# Patient Record
Sex: FEMALE | Race: WHITE | NOT HISPANIC OR LATINO | Employment: OTHER | URBAN - METROPOLITAN AREA
[De-identification: names, ages, dates, MRNs, and addresses within clinical notes are randomized per-mention and may not be internally consistent; named-entity substitution may affect disease eponyms.]

---

## 2018-02-08 ENCOUNTER — OFFICE VISIT CONVERTED (OUTPATIENT)
Dept: FAMILY MEDICINE CLINIC | Facility: CLINIC | Age: 73
End: 2018-02-08
Attending: NURSE PRACTITIONER

## 2018-04-30 ENCOUNTER — CONVERSION ENCOUNTER (OUTPATIENT)
Dept: GENERAL RADIOLOGY | Facility: HOSPITAL | Age: 73
End: 2018-04-30

## 2018-05-16 ENCOUNTER — CONVERSION ENCOUNTER (OUTPATIENT)
Dept: FAMILY MEDICINE CLINIC | Facility: CLINIC | Age: 73
End: 2018-05-16

## 2018-05-16 ENCOUNTER — OFFICE VISIT CONVERTED (OUTPATIENT)
Dept: FAMILY MEDICINE CLINIC | Facility: CLINIC | Age: 73
End: 2018-05-16
Attending: NURSE PRACTITIONER

## 2018-05-29 ENCOUNTER — CONVERSION ENCOUNTER (OUTPATIENT)
Dept: FAMILY MEDICINE CLINIC | Facility: CLINIC | Age: 73
End: 2018-05-29

## 2018-05-29 ENCOUNTER — OFFICE VISIT CONVERTED (OUTPATIENT)
Dept: FAMILY MEDICINE CLINIC | Facility: CLINIC | Age: 73
End: 2018-05-29
Attending: NURSE PRACTITIONER

## 2018-10-09 ENCOUNTER — OFFICE VISIT CONVERTED (OUTPATIENT)
Dept: FAMILY MEDICINE CLINIC | Facility: CLINIC | Age: 73
End: 2018-10-09
Attending: NURSE PRACTITIONER

## 2018-10-11 ENCOUNTER — OFFICE VISIT CONVERTED (OUTPATIENT)
Dept: FAMILY MEDICINE CLINIC | Facility: CLINIC | Age: 73
End: 2018-10-11
Attending: NURSE PRACTITIONER

## 2019-02-22 ENCOUNTER — HOSPITAL ENCOUNTER (OUTPATIENT)
Dept: FAMILY MEDICINE CLINIC | Facility: CLINIC | Age: 74
Discharge: HOME | End: 2019-02-22

## 2019-02-22 ENCOUNTER — OFFICE VISIT CONVERTED (OUTPATIENT)
Dept: FAMILY MEDICINE CLINIC | Facility: CLINIC | Age: 74
End: 2019-02-22
Attending: NURSE PRACTITIONER

## 2019-02-24 LAB — BACTERIA SPEC AEROBE CULT: NORMAL

## 2019-03-22 ENCOUNTER — OFFICE VISIT CONVERTED (OUTPATIENT)
Dept: FAMILY MEDICINE CLINIC | Facility: CLINIC | Age: 74
End: 2019-03-22
Attending: NURSE PRACTITIONER

## 2019-03-22 ENCOUNTER — CONVERSION ENCOUNTER (OUTPATIENT)
Dept: FAMILY MEDICINE CLINIC | Facility: CLINIC | Age: 74
End: 2019-03-22

## 2019-03-22 ENCOUNTER — HOSPITAL ENCOUNTER (OUTPATIENT)
Dept: FAMILY MEDICINE CLINIC | Facility: CLINIC | Age: 74
Discharge: HOME | End: 2019-03-22

## 2019-03-22 LAB
ALBUMIN SERPL-MCNC: 3.8 G/DL (ref 3.5–5)
ALBUMIN/GLOB SERPL: 1.1 {RATIO} (ref 1.4–2.6)
ALP SERPL-CCNC: 80 U/L (ref 43–160)
ALT SERPL-CCNC: 15 U/L (ref 10–40)
ANION GAP SERPL CALC-SCNC: 16 MMOL/L (ref 8–19)
AST SERPL-CCNC: 17 U/L (ref 15–50)
BILIRUB SERPL-MCNC: 0.45 MG/DL (ref 0.2–1.3)
BUN SERPL-MCNC: 17 MG/DL (ref 5–25)
BUN/CREAT SERPL: 28 {RATIO} (ref 6–20)
CALCIUM SERPL-MCNC: 9.3 MG/DL (ref 8.7–10.4)
CHLORIDE SERPL-SCNC: 101 MMOL/L (ref 99–111)
CHOLEST SERPL-MCNC: 136 MG/DL (ref 107–200)
CHOLEST/HDLC SERPL: 3.6 {RATIO} (ref 3–6)
CONV CO2: 25 MMOL/L (ref 22–32)
CONV CREATININE URINE, RANDOM: 49.7 MG/DL (ref 10–300)
CONV MICROALBUM.,U,RANDOM: <12 MG/L (ref 0–20)
CONV TOTAL PROTEIN: 7.3 G/DL (ref 6.3–8.2)
CREAT UR-MCNC: 0.6 MG/DL (ref 0.5–0.9)
EST. AVERAGE GLUCOSE BLD GHB EST-MCNC: 114 MG/DL
GFR SERPLBLD BASED ON 1.73 SQ M-ARVRAT: >60 ML/MIN/{1.73_M2}
GLOBULIN UR ELPH-MCNC: 3.5 G/DL (ref 2–3.5)
GLUCOSE SERPL-MCNC: 87 MG/DL (ref 65–99)
HBA1C MFR BLD: 5.6 % (ref 3.5–5.7)
HDLC SERPL-MCNC: 38 MG/DL (ref 40–60)
LDLC SERPL CALC-MCNC: 75 MG/DL (ref 70–100)
MICROALBUMIN/CREAT UR: 24.1 MG/G{CRE} (ref 0–35)
OSMOLALITY SERPL CALC.SUM OF ELEC: 287 MOSM/KG (ref 273–304)
POTASSIUM SERPL-SCNC: 4 MMOL/L (ref 3.5–5.3)
SODIUM SERPL-SCNC: 138 MMOL/L (ref 135–147)
TRIGL SERPL-MCNC: 113 MG/DL (ref 40–150)
VLDLC SERPL-MCNC: 23 MG/DL (ref 5–37)

## 2019-07-29 ENCOUNTER — HOSPITAL ENCOUNTER (OUTPATIENT)
Dept: GENERAL RADIOLOGY | Facility: HOSPITAL | Age: 74
Discharge: HOME | End: 2019-07-29

## 2019-10-02 ENCOUNTER — HOSPITAL ENCOUNTER (OUTPATIENT)
Dept: FAMILY MEDICINE CLINIC | Facility: CLINIC | Age: 74
Discharge: HOME | End: 2019-10-02
Attending: NURSE PRACTITIONER

## 2019-10-02 ENCOUNTER — OFFICE VISIT CONVERTED (OUTPATIENT)
Dept: FAMILY MEDICINE CLINIC | Facility: CLINIC | Age: 74
End: 2019-10-02
Attending: NURSE PRACTITIONER

## 2019-10-02 LAB
ALBUMIN SERPL-MCNC: 4.1 G/DL (ref 3.5–5)
ALBUMIN/GLOB SERPL: 1.2 {RATIO} (ref 1.4–2.6)
ALP SERPL-CCNC: 70 U/L (ref 43–160)
ALT SERPL-CCNC: 20 U/L (ref 10–40)
ANION GAP SERPL CALC-SCNC: 17 MMOL/L (ref 8–19)
AST SERPL-CCNC: 25 U/L (ref 15–50)
BASOPHILS # BLD AUTO: 0.05 10*3/UL (ref 0–0.2)
BASOPHILS NFR BLD AUTO: 0.6 % (ref 0–3)
BILIRUB SERPL-MCNC: 0.38 MG/DL (ref 0.2–1.3)
BUN SERPL-MCNC: 21 MG/DL (ref 5–25)
BUN/CREAT SERPL: 27 {RATIO} (ref 6–20)
CALCIUM SERPL-MCNC: 9.6 MG/DL (ref 8.7–10.4)
CHLORIDE SERPL-SCNC: 103 MMOL/L (ref 99–111)
CHOLEST SERPL-MCNC: 142 MG/DL (ref 107–200)
CHOLEST/HDLC SERPL: 4.1 {RATIO} (ref 3–6)
CONV ABS IMM GRAN: 0.03 10*3/UL (ref 0–0.2)
CONV CO2: 24 MMOL/L (ref 22–32)
CONV CREATININE URINE, RANDOM: 250.3 MG/DL (ref 10–300)
CONV IMMATURE GRAN: 0.3 % (ref 0–1.8)
CONV MICROALBUM.,U,RANDOM: <12 MG/L (ref 0–20)
CONV TOTAL PROTEIN: 7.5 G/DL (ref 6.3–8.2)
CREAT UR-MCNC: 0.78 MG/DL (ref 0.5–0.9)
DEPRECATED RDW RBC AUTO: 39.3 FL (ref 36.4–46.3)
EOSINOPHIL # BLD AUTO: 0.11 10*3/UL (ref 0–0.7)
EOSINOPHIL # BLD AUTO: 1.2 % (ref 0–7)
ERYTHROCYTE [DISTWIDTH] IN BLOOD BY AUTOMATED COUNT: 12.9 % (ref 11.7–14.4)
EST. AVERAGE GLUCOSE BLD GHB EST-MCNC: 120 MG/DL
GFR SERPLBLD BASED ON 1.73 SQ M-ARVRAT: >60 ML/MIN/{1.73_M2}
GLOBULIN UR ELPH-MCNC: 3.4 G/DL (ref 2–3.5)
GLUCOSE SERPL-MCNC: 123 MG/DL (ref 65–99)
HBA1C MFR BLD: 5.8 % (ref 3.5–5.7)
HCT VFR BLD AUTO: 42.9 % (ref 37–47)
HDLC SERPL-MCNC: 35 MG/DL (ref 40–60)
HGB BLD-MCNC: 14.3 G/DL (ref 12–16)
LDLC SERPL CALC-MCNC: 68 MG/DL (ref 70–100)
LYMPHOCYTES # BLD AUTO: 2.86 10*3/UL (ref 1–5)
LYMPHOCYTES NFR BLD AUTO: 31.8 % (ref 20–45)
MCH RBC QN AUTO: 28.3 PG (ref 27–31)
MCHC RBC AUTO-ENTMCNC: 33.3 G/DL (ref 33–37)
MCV RBC AUTO: 84.8 FL (ref 81–99)
MICROALBUMIN/CREAT UR: 4.8 MG/G{CRE} (ref 0–35)
MONOCYTES # BLD AUTO: 0.55 10*3/UL (ref 0.2–1.2)
MONOCYTES NFR BLD AUTO: 6.1 % (ref 3–10)
NEUTROPHILS # BLD AUTO: 5.39 10*3/UL (ref 2–8)
NEUTROPHILS NFR BLD AUTO: 60 % (ref 30–85)
NRBC CBCN: 0 % (ref 0–0.7)
OSMOLALITY SERPL CALC.SUM OF ELEC: 296 MOSM/KG (ref 273–304)
PLATELET # BLD AUTO: 357 10*3/UL (ref 130–400)
PMV BLD AUTO: 11.3 FL (ref 9.4–12.3)
POTASSIUM SERPL-SCNC: 3.4 MMOL/L (ref 3.5–5.3)
RBC # BLD AUTO: 5.06 10*6/UL (ref 4.2–5.4)
SODIUM SERPL-SCNC: 141 MMOL/L (ref 135–147)
T4 FREE SERPL-MCNC: 1.2 NG/DL (ref 0.9–1.8)
TRIGL SERPL-MCNC: 194 MG/DL (ref 40–150)
TSH SERPL-ACNC: 2.21 M[IU]/L (ref 0.27–4.2)
VLDLC SERPL-MCNC: 39 MG/DL (ref 5–37)
WBC # BLD AUTO: 8.99 10*3/UL (ref 4.8–10.8)

## 2019-10-03 LAB — 25(OH)D3 SERPL-MCNC: 19.1 NG/ML (ref 30–100)

## 2019-12-09 ENCOUNTER — OFFICE VISIT CONVERTED (OUTPATIENT)
Dept: FAMILY MEDICINE CLINIC | Facility: CLINIC | Age: 74
End: 2019-12-09
Attending: NURSE PRACTITIONER

## 2019-12-12 ENCOUNTER — OFFICE VISIT CONVERTED (OUTPATIENT)
Dept: ORTHOPEDIC SURGERY | Facility: CLINIC | Age: 74
End: 2019-12-12
Attending: ORTHOPAEDIC SURGERY

## 2020-01-27 ENCOUNTER — OFFICE VISIT CONVERTED (OUTPATIENT)
Dept: FAMILY MEDICINE CLINIC | Facility: CLINIC | Age: 75
End: 2020-01-27
Attending: NURSE PRACTITIONER

## 2020-01-27 ENCOUNTER — CONVERSION ENCOUNTER (OUTPATIENT)
Dept: FAMILY MEDICINE CLINIC | Facility: CLINIC | Age: 75
End: 2020-01-27

## 2020-01-30 ENCOUNTER — HOSPITAL ENCOUNTER (OUTPATIENT)
Dept: GENERAL RADIOLOGY | Facility: HOSPITAL | Age: 75
Discharge: HOME | End: 2020-01-30
Attending: NURSE PRACTITIONER

## 2020-01-30 ENCOUNTER — HOSPITAL ENCOUNTER (OUTPATIENT)
Dept: FAMILY MEDICINE CLINIC | Facility: CLINIC | Age: 75
Discharge: HOME | End: 2020-01-30
Attending: NURSE PRACTITIONER

## 2020-01-30 ENCOUNTER — OFFICE VISIT CONVERTED (OUTPATIENT)
Dept: FAMILY MEDICINE CLINIC | Facility: CLINIC | Age: 75
End: 2020-01-30
Attending: NURSE PRACTITIONER

## 2020-01-30 ENCOUNTER — CONVERSION ENCOUNTER (OUTPATIENT)
Dept: FAMILY MEDICINE CLINIC | Facility: CLINIC | Age: 75
End: 2020-01-30

## 2020-02-01 LAB
BACTERIA SPEC AEROBE CULT: ABNORMAL
BACTERIA SPEC AEROBE CULT: ABNORMAL

## 2020-06-01 ENCOUNTER — OFFICE VISIT CONVERTED (OUTPATIENT)
Dept: FAMILY MEDICINE CLINIC | Facility: CLINIC | Age: 75
End: 2020-06-01
Attending: NURSE PRACTITIONER

## 2020-06-01 ENCOUNTER — HOSPITAL ENCOUNTER (OUTPATIENT)
Dept: FAMILY MEDICINE CLINIC | Facility: CLINIC | Age: 75
Discharge: HOME | End: 2020-06-01
Attending: NURSE PRACTITIONER

## 2020-06-01 ENCOUNTER — CONVERSION ENCOUNTER (OUTPATIENT)
Dept: FAMILY MEDICINE CLINIC | Facility: CLINIC | Age: 75
End: 2020-06-01

## 2020-06-01 LAB
ALBUMIN SERPL-MCNC: 3.9 G/DL (ref 3.5–5)
ALBUMIN/GLOB SERPL: 1.2 {RATIO} (ref 1.4–2.6)
ALP SERPL-CCNC: 82 U/L (ref 43–160)
ALT SERPL-CCNC: 15 U/L (ref 10–40)
ANION GAP SERPL CALC-SCNC: 17 MMOL/L (ref 8–19)
AST SERPL-CCNC: 17 U/L (ref 15–50)
BASOPHILS # BLD AUTO: 0.07 10*3/UL (ref 0–0.2)
BASOPHILS NFR BLD AUTO: 0.7 % (ref 0–3)
BILIRUB SERPL-MCNC: 0.39 MG/DL (ref 0.2–1.3)
BUN SERPL-MCNC: 19 MG/DL (ref 5–25)
BUN/CREAT SERPL: 29 {RATIO} (ref 6–20)
CALCIUM SERPL-MCNC: 9.3 MG/DL (ref 8.7–10.4)
CHLORIDE SERPL-SCNC: 99 MMOL/L (ref 99–111)
CHOLEST SERPL-MCNC: 142 MG/DL (ref 107–200)
CHOLEST/HDLC SERPL: 4.1 {RATIO} (ref 3–6)
CONV ABS IMM GRAN: 0.02 10*3/UL (ref 0–0.2)
CONV CO2: 25 MMOL/L (ref 22–32)
CONV CREATININE URINE, RANDOM: 174.8 MG/DL (ref 10–300)
CONV IMMATURE GRAN: 0.2 % (ref 0–1.8)
CONV MICROALBUM.,U,RANDOM: <12 MG/L (ref 0–20)
CONV TOTAL PROTEIN: 7.1 G/DL (ref 6.3–8.2)
CREAT UR-MCNC: 0.66 MG/DL (ref 0.5–0.9)
DEPRECATED RDW RBC AUTO: 38.2 FL (ref 36.4–46.3)
EOSINOPHIL # BLD AUTO: 0.17 10*3/UL (ref 0–0.7)
EOSINOPHIL # BLD AUTO: 1.7 % (ref 0–7)
ERYTHROCYTE [DISTWIDTH] IN BLOOD BY AUTOMATED COUNT: 12.3 % (ref 11.7–14.4)
EST. AVERAGE GLUCOSE BLD GHB EST-MCNC: 123 MG/DL
GFR SERPLBLD BASED ON 1.73 SQ M-ARVRAT: >60 ML/MIN/{1.73_M2}
GLOBULIN UR ELPH-MCNC: 3.2 G/DL (ref 2–3.5)
GLUCOSE SERPL-MCNC: 93 MG/DL (ref 65–99)
HBA1C MFR BLD: 5.9 % (ref 3.5–5.7)
HCT VFR BLD AUTO: 43.9 % (ref 37–47)
HDLC SERPL-MCNC: 35 MG/DL (ref 40–60)
HGB BLD-MCNC: 14.5 G/DL (ref 12–16)
LDLC SERPL CALC-MCNC: 69 MG/DL (ref 70–100)
LYMPHOCYTES # BLD AUTO: 3.36 10*3/UL (ref 1–5)
LYMPHOCYTES NFR BLD AUTO: 33.4 % (ref 20–45)
MCH RBC QN AUTO: 28 PG (ref 27–31)
MCHC RBC AUTO-ENTMCNC: 33 G/DL (ref 33–37)
MCV RBC AUTO: 84.7 FL (ref 81–99)
MICROALBUMIN/CREAT UR: 6.9 MG/G{CRE} (ref 0–35)
MONOCYTES # BLD AUTO: 0.63 10*3/UL (ref 0.2–1.2)
MONOCYTES NFR BLD AUTO: 6.3 % (ref 3–10)
NEUTROPHILS # BLD AUTO: 5.81 10*3/UL (ref 2–8)
NEUTROPHILS NFR BLD AUTO: 57.7 % (ref 30–85)
NRBC CBCN: 0 % (ref 0–0.7)
OSMOLALITY SERPL CALC.SUM OF ELEC: 286 MOSM/KG (ref 273–304)
PLATELET # BLD AUTO: 373 10*3/UL (ref 130–400)
PMV BLD AUTO: 11.4 FL (ref 9.4–12.3)
POTASSIUM SERPL-SCNC: 3.6 MMOL/L (ref 3.5–5.3)
RBC # BLD AUTO: 5.18 10*6/UL (ref 4.2–5.4)
SODIUM SERPL-SCNC: 137 MMOL/L (ref 135–147)
T4 FREE SERPL-MCNC: 1.3 NG/DL (ref 0.9–1.8)
TRIGL SERPL-MCNC: 192 MG/DL (ref 40–150)
TSH SERPL-ACNC: 2.74 M[IU]/L (ref 0.27–4.2)
VLDLC SERPL-MCNC: 38 MG/DL (ref 5–37)
WBC # BLD AUTO: 10.06 10*3/UL (ref 4.8–10.8)

## 2020-06-02 LAB — 25(OH)D3 SERPL-MCNC: 25 NG/ML (ref 30–100)

## 2020-12-09 ENCOUNTER — HOSPITAL ENCOUNTER (OUTPATIENT)
Dept: FAMILY MEDICINE CLINIC | Facility: CLINIC | Age: 75
Discharge: HOME | End: 2020-12-09
Attending: NURSE PRACTITIONER

## 2020-12-09 ENCOUNTER — OFFICE VISIT CONVERTED (OUTPATIENT)
Dept: FAMILY MEDICINE CLINIC | Facility: CLINIC | Age: 75
End: 2020-12-09
Attending: NURSE PRACTITIONER

## 2020-12-09 ENCOUNTER — CONVERSION ENCOUNTER (OUTPATIENT)
Dept: FAMILY MEDICINE CLINIC | Facility: CLINIC | Age: 75
End: 2020-12-09

## 2020-12-09 LAB
ALBUMIN SERPL-MCNC: 4.2 G/DL (ref 3.5–5)
ALBUMIN/GLOB SERPL: 1.3 {RATIO} (ref 1.4–2.6)
ALP SERPL-CCNC: 86 U/L (ref 43–160)
ALT SERPL-CCNC: 18 U/L (ref 10–40)
ANION GAP SERPL CALC-SCNC: 14 MMOL/L (ref 8–19)
AST SERPL-CCNC: 27 U/L (ref 15–50)
BASOPHILS # BLD AUTO: 0.07 10*3/UL (ref 0–0.2)
BASOPHILS NFR BLD AUTO: 0.7 % (ref 0–3)
BILIRUB SERPL-MCNC: 0.53 MG/DL (ref 0.2–1.3)
BUN SERPL-MCNC: 12 MG/DL (ref 5–25)
BUN/CREAT SERPL: 46 {RATIO} (ref 6–20)
CALCIUM SERPL-MCNC: 9.3 MG/DL (ref 8.7–10.4)
CHLORIDE SERPL-SCNC: 100 MMOL/L (ref 99–111)
CHOLEST SERPL-MCNC: 146 MG/DL (ref 107–200)
CHOLEST/HDLC SERPL: 3.5 {RATIO} (ref 3–6)
CONV ABS IMM GRAN: 0.03 10*3/UL (ref 0–0.2)
CONV CO2: 27 MMOL/L (ref 22–32)
CONV IMMATURE GRAN: 0.3 % (ref 0–1.8)
CONV TOTAL PROTEIN: 7.5 G/DL (ref 6.3–8.2)
CREAT UR-MCNC: 0.26 MG/DL (ref 0.5–0.9)
DEPRECATED RDW RBC AUTO: 39.2 FL (ref 36.4–46.3)
EOSINOPHIL # BLD AUTO: 0.22 10*3/UL (ref 0–0.7)
EOSINOPHIL # BLD AUTO: 2.2 % (ref 0–7)
ERYTHROCYTE [DISTWIDTH] IN BLOOD BY AUTOMATED COUNT: 12.8 % (ref 11.7–14.4)
EST. AVERAGE GLUCOSE BLD GHB EST-MCNC: 114 MG/DL
GFR SERPLBLD BASED ON 1.73 SQ M-ARVRAT: >60 ML/MIN/{1.73_M2}
GLOBULIN UR ELPH-MCNC: 3.3 G/DL (ref 2–3.5)
GLUCOSE SERPL-MCNC: 92 MG/DL (ref 65–99)
HBA1C MFR BLD: 5.6 % (ref 3.5–5.7)
HCT VFR BLD AUTO: 44.7 % (ref 37–47)
HDLC SERPL-MCNC: 42 MG/DL (ref 40–60)
HGB BLD-MCNC: 14.9 G/DL (ref 12–16)
LDLC SERPL CALC-MCNC: 79 MG/DL (ref 70–100)
LYMPHOCYTES # BLD AUTO: 4.3 10*3/UL (ref 1–5)
LYMPHOCYTES NFR BLD AUTO: 42.5 % (ref 20–45)
MCH RBC QN AUTO: 28.2 PG (ref 27–31)
MCHC RBC AUTO-ENTMCNC: 33.3 G/DL (ref 33–37)
MCV RBC AUTO: 84.7 FL (ref 81–99)
MONOCYTES # BLD AUTO: 0.47 10*3/UL (ref 0.2–1.2)
MONOCYTES NFR BLD AUTO: 4.6 % (ref 3–10)
NEUTROPHILS # BLD AUTO: 5.02 10*3/UL (ref 2–8)
NEUTROPHILS NFR BLD AUTO: 49.7 % (ref 30–85)
NRBC CBCN: 0 % (ref 0–0.7)
OSMOLALITY SERPL CALC.SUM OF ELEC: 285 MOSM/KG (ref 273–304)
PLATELET # BLD AUTO: 385 10*3/UL (ref 130–400)
PMV BLD AUTO: 11.4 FL (ref 9.4–12.3)
POTASSIUM SERPL-SCNC: 3.4 MMOL/L (ref 3.5–5.3)
RBC # BLD AUTO: 5.28 10*6/UL (ref 4.2–5.4)
SODIUM SERPL-SCNC: 138 MMOL/L (ref 135–147)
T4 FREE SERPL-MCNC: 1.3 NG/DL (ref 0.9–1.8)
TRIGL SERPL-MCNC: 126 MG/DL (ref 40–150)
TSH SERPL-ACNC: 2.08 M[IU]/L (ref 0.27–4.2)
VLDLC SERPL-MCNC: 25 MG/DL (ref 5–37)
WBC # BLD AUTO: 10.11 10*3/UL (ref 4.8–10.8)

## 2020-12-10 LAB — 25(OH)D3 SERPL-MCNC: 27.1 NG/ML (ref 30–100)

## 2021-02-02 ENCOUNTER — HOSPITAL ENCOUNTER (OUTPATIENT)
Dept: VACCINE CLINIC | Facility: HOSPITAL | Age: 76
Discharge: HOME | End: 2021-02-02
Attending: INTERNAL MEDICINE

## 2021-02-26 ENCOUNTER — HOSPITAL ENCOUNTER (OUTPATIENT)
Dept: VACCINE CLINIC | Facility: HOSPITAL | Age: 76
Discharge: HOME | End: 2021-02-26
Attending: INTERNAL MEDICINE

## 2021-05-13 NOTE — PROGRESS NOTES
Progress Note      Patient Name: Gaby Reyes   Patient ID: [id number]   Sex: Female   YOB: 1945    Primary Care Provider: Ramiro GRIGSBY   Referring Provider: Ramiro GRIGSBY    Visit Date: December 9, 2020    Provider: CALISTA Mcfarlane   Location: [facility]   Location Address: [address] [place] [address], [address] [place] [address]  [id number]   Location Phone: [phone]          Chief Complaint  · 6 month follow-up      History Of Present Illness  Gaby Reyes is a 74 year old /White female who presents for evaluation and treatment of:      Patient is a 74-year-old female who comes in for 6-month follow-up.  She is already received her flu vaccine this year, she has never smoked.  Last hemoglobin A1c was 5.9.  Last eye exam on file is August 2018.  Patient has a history of hypertension, hyperlipidemia, polyarthralgia, seasonal allergies and asthma, and type 2 diabetes.  Blood pressure today is 126/88 she denies any headache, chest pain, or change in vision.  She states overall she is feeling well with no complaints.  Patient is just requesting medication refills and labs checked today.       Past Medical History  Disease Name Date Onset Notes   Allergic rhinitis 10/30/2014 --    Arthritis --  --    Asthma --  stopped in 2003   Bronchitis --  --    Deafness --  --    Gall Stones 1997 --    High blood pressure --  --    High cholesterol --  --    Hyperlipemia --  --    Hyperlipidemia 02/08/2018 --    Hypertension --  --    Impaired fasting glucose 04/28/2014 04/28/2014    Knee pain, chronic, right 01/29/2015 --    Osteopenia 11/2015 L1 lumbar spine   Pneumonia --  --    Pressure ulcer, stage 1 --  --    Reflux --  --    Screening for colon cancer 12/18/2014 --    Seasonal allergies --  --    Sinus Trouble; unspecified --  --    Type 2 diabetes mellitus without complication --  --          Past Surgical History  Procedure Name Date Notes   Colonoscopy --   --    Gallbladder 97 --    Hysterectomy 1983 --    Tonsillectomy --  --          Medication List  Name Date Started Instructions   Advair -21 mcg/actuation inhalation HFA aerosol inhaler  inhale 2 puffs by inhalation route 2 times per day in the morning and evening   Allegra Allergy 180 mg oral tablet 06/01/2020 take 1 tablet by oral route daily for 90 days   aspirin 81 mg oral tablet,chewable 12/09/2020 chew 1 tablet (81 mg) by oral route once daily for 90 days   azelastine 137 mcg (0.1 %) nasal aerosol,spray 06/15/2020 spray 1 spray (137 mcg) in each nostril by intranasal route 2 times per day for 90 days   Caltrate 600 plus D 600 mg (1,500 mg)-800 unit oral tablet,chewable 12/09/2020 chew 1 tablet by oral route 2 times a day for 90 days   fluticasone propionate 50 mcg/actuation nasal spray,suspension 06/01/2020 inhale 1 spray (50 mcg) in each nostril by intranasal route once daily   hydrochlorothiazide 25 mg oral tablet 12/09/2020 take 1 tablet (25 mg) by oral route once daily for 90 days   meloxicam 7.5 mg oral tablet 12/09/2020 take 1 tablet (7.5 mg) by oral route once daily with food for arthritis pain   metformin 500 mg oral tablet extended release 24 hr 12/09/2020 take 1 tablet (500 mg) by oral route once daily with the evening meal for 90 days   Miralax 17 gram/dose oral powder 12/09/2020 take 17 gram mixed with 8 oz. water, juice, soda, coffee or tea by oral route once daily for 90 days   potassium chloride 20 mEq oral tablet extended release 06/01/2020 take 1 tablet (20 meq) by oral route once daily with food for 90 days   pravastatin 10 mg oral tablet 06/01/2020 take 1 tablet by oral route once a day (at bedtime) for 90 days   ProAir HFA 90 mcg/actuation inhalation HFA aerosol inhaler 06/15/2020 inhale 1 - 2 puffs (90 - 180 mcg) by inhalation route every 4-6 hours as needed for 90 days   Singulair 10 mg oral tablet 06/01/2020 take 1 tablet (10 mg) by oral route once daily in the evening for 90  days   Vitamin C 500 mg oral tablet  take 1 tablet by oral route 2 times a day   Vitamin D2 1,250 mcg (50,000 unit) oral capsule 2020 take 1 capsule by oral route once a week for 120 days         Allergy List  Allergen Name Date Reaction Notes   NO KNOWN DRUG ALLERGIES --  --  --          Family Medical History  Disease Name Relative/Age Notes   Heart Disease Brother/  Father/   Father; Brother  father MI massive age 59; brother age 50   Cancer, Unspecified Mother/   Mother   Diabetes, unspecified type Brother/  Mother/   Mother; Brother   Diabetes Mellitus, Type II Brother/  Mother/   --    Kidney Cancer Mother/   --    Family history of Arthritis Father/   Father         Reproductive History  Menstrual   Last Menstrual Period: 1983 Menopause Status: Postmenopausal Age Menopause: 38   Pregnancy Summary   Total Pregnancies: 0 Full Term: 0 Premature: 0   Ab Induced: 0 Ab Spontaneous: 0 Ectopics: 0   Multiples: 0 Livin         Social History  Finding Status Start/Stop Quantity Notes   Alcohol Never --/-- --  --    Alcohol Use Never --/-- --  does not drink   Homemaker. --  --/-- --  --    lives with spouse --  --/-- --  --    . --  --/-- --  --    Recreational Drug Use Never --/-- --  no   Retired. --  --/-- --  --    Tobacco Never --/-- --  never smoker         Immunizations  NameDate Admin Mfg Trade Name Lot Number Route Inj VIS Given VIS Publication   [id number] NE Not Entered  NE NE     Comments: pt states received at [facility] in 10/2020   Qrkxauhhopsu99/ UNK Unknown TradeName [id number] IM RD 10/21/2015 2013   Comments: tolerated well   Pitwfgubznfj80/ UNK Unknown TradeName [id number] IM RD 10/21/2015 2013   Comments: tolerated well         Review of Systems  · Constitutional  o Denies  o : fever, fatigue, weight loss, weight gain  · HENT  o Denies  o : headaches, vertigo, lightheadedness  · Cardiovascular  o Denies  o : lower extremity edema,  "claudication, chest pressure, palpitations  · Respiratory  o Denies  o : shortness of breath, wheezing, cough, hemoptysis, dyspnea on exertion  · Gastrointestinal  o Denies  o : nausea, vomiting, diarrhea, constipation, abdominal pain  · Integument  o Denies  o : rash, itching, pigmentation changes  · Musculoskeletal  o Admits  o : joint pain  o Denies  o : joint swelling, muscle pain  · Psychiatric  o Denies  o : anxiety, depression, suicidal ideation, homicidal ideation  · Allergic-Immunologic  o Admits  o : sinus allergy symptoms      Vitals  Date Time BP Position Site L\R Cuff Size HR RR TEMP (F) WT  HT  BMI kg/m2 BSA m2 O2 Sat FR L/min FiO2 HC       12/09/2020 03:00 /88 Sitting    80 - R  98 216lbs 4oz 5'  7\" 33.87 2.15 97 %            Physical Examination  · Constitutional  o Appearance  o : well-nourished, well developed, in no acute distress  · Eyes  o Conjunctivae  o : conjunctivae normal, no exudates present  o Sclerae  o : sclerae white  o Pupils and Irises  o : pupils equal and round, and reactive to light and accomodation bilaterally  o Eyelids/Ocular Adnexae  o : extra ocular movements intact  · Respiratory  o Respiratory Effort  o : breathing unlabored, no accessory muscle use  o Inspection of Chest  o : normal appearance, no retractions  o Auscultation of Lungs  o : normal breath sounds bilaterally  · Cardiovascular  o Heart  o :   § Auscultation of Heart  § : regular rate and rhythm, no murmurs, gallops or rubs  o Peripheral Vascular System  o :   § Extremities  § : no edema  · Neurologic  o Mental Status Examination  o :   § Orientation  § : alert and oriented x3  § Speech/Language  § : normal speech pattern  o Gait and Station  o : normal gait, able to stand without difficulty  · Psychiatric  o Judgment and Insight  o : judgment and insight intact, judgement for everyday activities and social situations within normal limits, insight intact  o Thought Processes  o : rate of thoughts normal, " thought content logical  o Mood and Affect  o : mood normal, affect appropriate              Assessment  · Allergic rhinitis due to allergen     477.9/J30.9  · Diabetes mellitus, type 2     250.00/E11.9  · Essential hypertension     401.9/I10  · Hyperlipidemia     272.4/E78.5  · Obesity     278.00/E66.9  · Polyarthralgia     719.49/M25.50  · Vitamin D deficiency     268.9/E55.9  · Wellness examination     V70.0/Z00.00      Plan  · Orders  o Diabetes 1 Panel (CMP, Lipid, A1c) [facility] (24818, 74663, 57788) - 250.00/E11.9 - 12/09/2020  o CBC with Auto Diff [facility] (42047) - 250.00/E11.9 - 12/09/2020  o Thyroid Profile (29881, THYII, 67712) - 250.00/E11.9 - 12/09/2020  o Vitamin D Level (08414) - 268.9/E55.9 - 12/09/2020  o ACO-39: Current medications updated and reviewed (, 1159F) - - 12/09/2020  o ACO-14: Influenza immunization administered or previously received [facility] () - - 12/09/2020   pt states received 10/20 at [facility]   · Medications  o Allegra Allergy 180 mg oral tablet   SIG: take 1 tablet (180 mg) by oral route once daily for 90 days   DISP: (90) Tablet with 1 refills  Adjusted on 12/09/2020     o aspirin 81 mg oral tablet,chewable   SIG: chew 1 tablet (81 mg) by oral route once daily for 90 days   DISP: (90) Tablet with 1 refills  Adjusted on 12/09/2020     o Caltrate 600 plus D 600 mg (1,500 mg)-800 unit oral tablet,chewable   SIG: chew 1 tablet by oral route 2 times a day for 90 days   DISP: (180) Tablet with 1 refills  Adjusted on 12/09/2020     o fluticasone propionate 50 mcg/actuation nasal spray,suspension   SIG: inhale 1 spray (50 mcg) in each nostril by intranasal route once daily   DISP: (3) Bottle with 1 refills  Adjusted on 12/09/2020     o hydrochlorothiazide 25 mg oral tablet   SIG: take 1 tablet (25 mg) by oral route once daily for 90 days   DISP: (90) Tablet with 1 refills  Adjusted on 12/09/2020     o meloxicam 7.5 mg oral tablet   SIG: take 1 tablet (7.5 mg) by oral route once daily  with food for arthritis pain   DISP: (90) Tablet with 1 refills  Adjusted on 12/09/2020     o metformin 500 mg oral tablet extended release 24 hr   SIG: take 1 tablet (500 mg) by oral route once daily with the evening meal for 90 days   DISP: (90) Tablet with 1 refills  Adjusted on 12/09/2020     o Miralax 17 gram/dose oral powder   SIG: take 17 gram mixed with 8 oz. water, juice, soda, coffee or tea by oral route once daily for 90 days   DISP: (1) Bottle with 1 refills  Adjusted on 12/09/2020     o potassium chloride 20 mEq oral tablet extended release   SIG: take 1 tablet (20 meq) by oral route once daily with food for 90 days   DISP: (90) Tablet with 1 refills  Adjusted on 12/09/2020     o pravastatin 10 mg oral tablet   SIG: take 1 tablet (10 mg) by oral route once daily at bedtime for 90 days   DISP: (90) Tablet with 1 refills  Adjusted on 12/09/2020     o ProAir HFA 90 mcg/actuation inhalation HFA aerosol inhaler   SIG: inhale 1 - 2 puffs (90 - 180 mcg) by inhalation route every 4-6 hours as needed for 90 days   DISP: (3) Canister with 1 refills  Adjusted on 12/09/2020     o Singulair 10 mg oral tablet   SIG: take 1 tablet (10 mg) by oral route once daily in the evening for 90 days   DISP: (90) Tablet with 1 refills  Adjusted on 12/09/2020     o Vitamin D2 1,250 mcg (50,000 unit) oral capsule   SIG: take 1 capsule by oral route weekly   DISP: (13) Capsule with 1 refills  Adjusted on 12/09/2020     o Medications have been Reconciled  o Transition of Care or Provider Policy  · Instructions  o Daily foot care. Avoid walking barefoot. Annual Dilated Eye Exam.  o Discussed with patient blood pressure monitoring, hemoglobin A1C levels need to be below 7.0, and LDL (Lipid) goals below 70.  o Patient advised to monitor blood pressure (B/P) at home and journal readings. Patient informed that a B/P reading at home of more than 130/80 is considered hypertension. For readings greater lofb179/90 or higher patient is  advised to follow up in the office with readings for management. Patient advised to limit sodium intake.  o Advised that cheeses and other sources of dairy fats, animal fats, fast food, and the extras (candy, pastries, pies, doughnuts and cookies) all contain LDL raising nutrients. Advised to increase fruits, vegetables, whole grains, and to monitor portion sizes.   o (NSAID) Non-steroidal Anti-inflammatory medication was recommended/prescribed. Ensure you take this medication with food as directed. Do not use Motrin/ibuprofen/Advil while using the anti-inflammatory medication prescribed.  o Patient was educated/instructed on their diagnosis, treatment and medications prior to discharge from the clinic today.  · Disposition  o Call or Return if symptoms worsen or persist.  o follow up in 6 months  o follow up as needed  o call the office with any questions or concerns            Electronically Signed by: CALISTA Mcfarlane -Author on December 9, 2020 03:51:18 PM

## 2021-05-14 VITALS
DIASTOLIC BLOOD PRESSURE: 88 MMHG | OXYGEN SATURATION: 97 % | HEART RATE: 80 BPM | BODY MASS INDEX: 33.94 KG/M2 | WEIGHT: 216.25 LBS | SYSTOLIC BLOOD PRESSURE: 126 MMHG | TEMPERATURE: 98 F | HEIGHT: 67 IN

## 2021-05-15 VITALS
OXYGEN SATURATION: 94 % | HEIGHT: 67 IN | BODY MASS INDEX: 38.06 KG/M2 | WEIGHT: 242.5 LBS | DIASTOLIC BLOOD PRESSURE: 86 MMHG | HEART RATE: 130 BPM | TEMPERATURE: 97.8 F | SYSTOLIC BLOOD PRESSURE: 130 MMHG

## 2021-05-15 VITALS
HEIGHT: 67 IN | DIASTOLIC BLOOD PRESSURE: 90 MMHG | WEIGHT: 243.5 LBS | SYSTOLIC BLOOD PRESSURE: 146 MMHG | TEMPERATURE: 98.1 F | OXYGEN SATURATION: 97 % | BODY MASS INDEX: 38.22 KG/M2 | HEART RATE: 113 BPM

## 2021-05-15 VITALS
BODY MASS INDEX: 37.51 KG/M2 | SYSTOLIC BLOOD PRESSURE: 126 MMHG | DIASTOLIC BLOOD PRESSURE: 76 MMHG | OXYGEN SATURATION: 96 % | HEART RATE: 89 BPM | TEMPERATURE: 98.1 F | HEIGHT: 67 IN | WEIGHT: 239 LBS

## 2021-05-15 VITALS
SYSTOLIC BLOOD PRESSURE: 132 MMHG | DIASTOLIC BLOOD PRESSURE: 86 MMHG | HEIGHT: 67 IN | HEART RATE: 106 BPM | WEIGHT: 244.25 LBS | BODY MASS INDEX: 38.34 KG/M2 | OXYGEN SATURATION: 95 % | TEMPERATURE: 97.9 F

## 2021-05-15 VITALS — BODY MASS INDEX: 39.26 KG/M2 | WEIGHT: 250.12 LBS | HEIGHT: 67 IN | HEART RATE: 91 BPM | OXYGEN SATURATION: 97 %

## 2021-05-15 VITALS
HEART RATE: 87 BPM | WEIGHT: 234.12 LBS | BODY MASS INDEX: 36.74 KG/M2 | TEMPERATURE: 97.3 F | HEIGHT: 67 IN | OXYGEN SATURATION: 95 % | DIASTOLIC BLOOD PRESSURE: 86 MMHG | SYSTOLIC BLOOD PRESSURE: 116 MMHG

## 2021-05-15 VITALS
TEMPERATURE: 97.9 F | HEART RATE: 90 BPM | BODY MASS INDEX: 38.96 KG/M2 | DIASTOLIC BLOOD PRESSURE: 88 MMHG | SYSTOLIC BLOOD PRESSURE: 130 MMHG | HEIGHT: 67 IN | OXYGEN SATURATION: 97 % | WEIGHT: 248.25 LBS

## 2021-05-16 VITALS
SYSTOLIC BLOOD PRESSURE: 126 MMHG | WEIGHT: 237.25 LBS | HEIGHT: 67 IN | OXYGEN SATURATION: 97 % | DIASTOLIC BLOOD PRESSURE: 74 MMHG | HEART RATE: 98 BPM | BODY MASS INDEX: 37.24 KG/M2 | TEMPERATURE: 97.8 F

## 2021-05-16 VITALS
BODY MASS INDEX: 36.88 KG/M2 | SYSTOLIC BLOOD PRESSURE: 118 MMHG | TEMPERATURE: 97.9 F | DIASTOLIC BLOOD PRESSURE: 72 MMHG | WEIGHT: 235 LBS | HEIGHT: 67 IN | HEART RATE: 94 BPM | OXYGEN SATURATION: 95 %

## 2021-05-16 VITALS
OXYGEN SATURATION: 97 % | TEMPERATURE: 98.1 F | BODY MASS INDEX: 37.57 KG/M2 | HEART RATE: 81 BPM | WEIGHT: 239.37 LBS | DIASTOLIC BLOOD PRESSURE: 82 MMHG | SYSTOLIC BLOOD PRESSURE: 130 MMHG | HEIGHT: 67 IN

## 2021-05-16 VITALS
RESPIRATION RATE: 16 BRPM | HEIGHT: 67 IN | OXYGEN SATURATION: 98 % | SYSTOLIC BLOOD PRESSURE: 124 MMHG | DIASTOLIC BLOOD PRESSURE: 70 MMHG | TEMPERATURE: 97.6 F | WEIGHT: 229.12 LBS | HEART RATE: 79 BPM | BODY MASS INDEX: 35.96 KG/M2

## 2021-05-16 VITALS
HEIGHT: 67 IN | DIASTOLIC BLOOD PRESSURE: 68 MMHG | WEIGHT: 225.5 LBS | OXYGEN SATURATION: 97 % | RESPIRATION RATE: 16 BRPM | TEMPERATURE: 97.9 F | SYSTOLIC BLOOD PRESSURE: 120 MMHG | HEART RATE: 91 BPM | BODY MASS INDEX: 35.39 KG/M2

## 2021-05-16 VITALS
WEIGHT: 230.12 LBS | OXYGEN SATURATION: 97 % | HEART RATE: 72 BPM | BODY MASS INDEX: 36.12 KG/M2 | SYSTOLIC BLOOD PRESSURE: 130 MMHG | HEIGHT: 67 IN | RESPIRATION RATE: 16 BRPM | DIASTOLIC BLOOD PRESSURE: 60 MMHG | TEMPERATURE: 98.7 F

## 2021-05-19 ENCOUNTER — OFFICE VISIT CONVERTED (OUTPATIENT)
Dept: FAMILY MEDICINE CLINIC | Facility: CLINIC | Age: 76
End: 2021-05-19
Attending: NURSE PRACTITIONER

## 2021-06-05 NOTE — PROGRESS NOTES
Progress Note      Patient Name: Gaby Reyes   Patient ID: [id number]   Sex: Female   YOB: 1945    Primary Care Provider: Ramiro GRIGSBY   Referring Provider: Ramiro GRIGSBY    Visit Date: May 19, 2021    Provider: CALISTA Mcfarlane   Location: [facility]   Location Address: [address] [place] [address], [address] [place] [address]  [id number]   Location Phone: [phone]          Chief Complaint  · Left hip and leg pain      History Of Present Illness  Gaby Reyes is a 75 year old /White female who presents for evaluation and treatment of:      Patient is a 75-year-old female who has been complaining of intermittent hip pain right side since April.  She states it would wax and wane in intensity.  It would radiate down her leg and across her anterior shin.  No weakness in her leg until after she got done doing work in her garden yesterday.  She had sat on a bench planting flowers, she states she went to the house and sat down and she went to get up and noticed severe pain radiating down her leg and into her right hip.  Due to the pain she did feel like she had some weakness, and had to sit back down.  She states today it still hurts the weakness is not as severe she is able to ambulate without any assistance.  She states that it is painful to 4 palpation over the lateral hip over the bursae.  She states the pain will radiate down the leg.  No previous injury to the hip.  Is not been seen previously for this issue.       Past Medical History  Disease Name Date Onset Notes   Allergic rhinitis 10/30/2014 --    Arthritis --  --    Asthma --  stopped in 2003   Bronchitis --  --    Deafness --  --    Gall Stones 1997 --    High blood pressure --  --    High cholesterol --  --    Hyperlipemia --  --    Hyperlipidemia 02/08/2018 --    Hypertension --  --    Impaired fasting glucose 04/28/2014 04/28/2014    Knee pain, chronic, right 01/29/2015 --    Osteopenia  11/2015 L1 lumbar spine   Pneumonia --  --    Pressure ulcer, stage 1 --  --    Reflux --  --    Screening for colon cancer 12/18/2014 --    Seasonal allergies --  --    Sinus Trouble; unspecified --  --    Type 2 diabetes mellitus without complication --  --          Past Surgical History  Procedure Name Date Notes   Colonoscopy --  --    Gallbladder 97 --    Hysterectomy 1983 --    Tonsillectomy --  --          Medication List  Name Date Started Instructions   Advair -21 mcg/actuation inhalation HFA aerosol inhaler  inhale 2 puffs by inhalation route 2 times per day in the morning and evening   Allegra Allergy 180 mg oral tablet 12/09/2020 take 1 tablet (180 mg) by oral route once daily for 90 days   aspirin 81 mg oral tablet,chewable 12/09/2020 chew 1 tablet (81 mg) by oral route once daily for 90 days   azelastine 137 mcg (0.1 %) nasal aerosol,spray 06/15/2020 spray 1 spray (137 mcg) in each nostril by intranasal route 2 times per day for 90 days   Caltrate 600 plus D 600 mg (1,500 mg)-800 unit oral tablet,chewable 12/09/2020 chew 1 tablet by oral route 2 times a day for 90 days   fluticasone propionate 50 mcg/actuation nasal spray,suspension 12/09/2020 inhale 1 spray (50 mcg) in each nostril by intranasal route once daily   hydrochlorothiazide 25 mg oral tablet 12/09/2020 take 1 tablet (25 mg) by oral route once daily for 90 days   meloxicam 7.5 mg oral tablet 12/09/2020 take 1 tablet (7.5 mg) by oral route once daily with food for arthritis pain   metformin 500 mg oral tablet extended release 24 hr 12/09/2020 take 1 tablet (500 mg) by oral route once daily with the evening meal for 90 days   Miralax 17 gram/dose oral powder 12/09/2020 take 17 gram mixed with 8 oz. water, juice, soda, coffee or tea by oral route once daily for 90 days   potassium chloride 20 mEq oral tablet extended release 12/09/2020 take 1 tablet (20 meq) by oral route once daily with food for 90 days   pravastatin 10 mg oral tablet  2020 take 1 tablet (10 mg) by oral route once daily at bedtime for 90 days   ProAir HFA 90 mcg/actuation inhalation HFA aerosol inhaler 2020 inhale 1 - 2 puffs (90 - 180 mcg) by inhalation route every 4-6 hours as needed for 90 days   Singulair 10 mg oral tablet 2020 take 1 tablet (10 mg) by oral route once daily in the evening for 90 days   Vitamin C 500 mg oral tablet  take 1 tablet by oral route 2 times a day   Vitamin D2 1,250 mcg (50,000 unit) oral capsule 2020 take 1 capsule by oral route weekly         Allergy List  Allergen Name Date Reaction Notes   NO KNOWN DRUG ALLERGIES --  --  --        Allergies Reconciled  Family Medical History  Disease Name Relative/Age Notes   Heart Disease Brother/  Father/   Father; Brother  father MI massive age 59; brother age 50   Cancer, Unspecified Mother/   Mother   Diabetes, unspecified type Brother/  Mother/   Mother; Brother   Diabetes Mellitus, Type II Brother/  Mother/   --    Kidney Cancer Mother/   --    Family history of Arthritis Father/   Father         Reproductive History  Menstrual   Last Menstrual Period: 1983 Menopause Status: Postmenopausal Age Menopause: 38   Pregnancy Summary   Total Pregnancies: 0 Full Term: 0 Premature: 0   Ab Induced: 0 Ab Spontaneous: 0 Ectopics: 0   Multiples: 0 Livin         Social History  Finding Status Start/Stop Quantity Notes   Alcohol Never --/-- --  --    Alcohol Use Never --/-- --  does not drink   Homemaker. --  --/-- --  --    lives with spouse --  --/-- --  --    . --  --/-- --  --    Recreational Drug Use Never --/-- --  no   Retired. --  --/-- --  --    Tobacco Never --/-- --  never smoker         Immunizations  NameDate Admin Mfg Trade Name Lot Number Route Inj VIS Given VIS Publication   [id number]/2020 NE Not Entered  NE NE     Comments: pt states received at [facility] in 10/2020   Edaacrjuhpih40/ UNK Unknown TradeName [id number] IM RD 10/21/2015 2013  "  Comments: tolerated well   Kkhntnggkupn17/21/2015 UNK Unknown TradeName [id number] IM RD 10/21/2015 02/27/2013   Comments: tolerated well         Review of Systems  · Constitutional  o Denies  o : fever, fatigue, weight loss, weight gain  · HENT  o Denies  o : headaches, vertigo, lightheadedness  · Cardiovascular  o Denies  o : lower extremity edema, claudication, chest pressure, palpitations  · Respiratory  o Denies  o : shortness of breath, wheezing, cough, hemoptysis, dyspnea on exertion  · Gastrointestinal  o Denies  o : nausea, vomiting, diarrhea, constipation, abdominal pain  · Integument  o Denies  o : rash, itching, pigmentation changes  · Musculoskeletal  o Admits  o : joint pain, muscular weakness, hip pain,Radiation down right lower extremity  o Denies  o : joint swelling      Vitals  Date Time BP Position Site L\R Cuff Size HR RR TEMP (F) WT  HT  BMI kg/m2 BSA m2 O2 Sat FR L/min FiO2        05/19/2021 02:59 /84 Sitting    76 - R  97.2 211lbs 6oz 5'  7\" 33.11 2.13 97 %            Physical Examination  · Constitutional  o Appearance  o : well-nourished, well developed, in no acute distress  · Eyes  o Conjunctivae  o : conjunctivae normal, no exudates present  o Sclerae  o : sclerae white  o Pupils and Irises  o : pupils equal and round, and reactive to light and accomodation bilaterally  o Eyelids/Ocular Adnexae  o : extra ocular movements intact  · Respiratory  o Respiratory Effort  o : breathing unlabored, no accessory muscle use  o Inspection of Chest  o : normal appearance, no retractions  o Auscultation of Lungs  o : normal breath sounds bilaterally  · Cardiovascular  o Heart  o :   § Auscultation of Heart  § : regular rate and rhythm, no murmurs, gallops or rubs  o Peripheral Vascular System  o :   § Extremities  § : no edema  · Musculoskeletal  o General  o : Right hip: pain with palpation over bursa  · Neurologic  o Mental Status Examination  o :   § Orientation  § : alert and oriented " x3  § Speech/Language  § : normal speech pattern  o Gait and Station  o : normal gait, able to stand without difficulty  · Psychiatric  o Judgement and Insight  o : judgment and insight intact, judgement for everyday activities and social situations within normal limits, insight intact  o Thought Processes  o : rate of thoughts normal, thought content logical  o Mood and Affect  o : mood normal, affect appropriate              Assessment  · Right hip pain     719.45/M25.551  · Bursitis     727.3/M71.9  Will give injection of Depo-Medrol and Toradol, stretches to do at home. Discussed with patient that if no improvement call and we will refer over to orthopedic MD       Plan  · Orders  o IM - Injection Fee [facility] (77852) - - 05/19/2021  o ACO-39: Current medications updated and reviewed (1159F, ) - - 05/19/2021  o 2.00 - Toradol Injection 30mg (-7) - - 05/19/2021   Injection - Toradol 30 mg; Dose: 30 mg; Site: Right Gluteus; Route: intramuscular; Date: 05/19/2021 16:14:58; Exp: 03/01/2022; Lot: [id number]; Mfg: FRESENIUS KABI; TradeName: ketorolac; Location: [facility]; Administered By: Dolores Acosta MA; Comment: Pt tolerated well, stable condition  o Depo-Medrol 80 () - - 05/19/2021   Injection - Depo Medrol 80 mg; Dose: 80 mg; Site: Left Gluteus; Route: intramuscular; Date: 05/19/2021 16:13:42; Exp: 12/01/2021; Lot: [id number]; Mfg: TEVA PHARM; TradeName: methylprednisolone; Location: [facility]; Administered By: Dolores Acosta MA; Comment: Pt tolerated well, stable condition  · Medications  o cyclobenzaprine 10 mg oral tablet   SIG: take 1 tablet by oral route 3 times a day for 15 days   DISP: (45) Tablet with 0 refills  Adjusted on 05/19/2021     · Instructions  o Take all medications as prescribed/directed.  o Patient was educated/instructed on their diagnosis, treatment and medications prior to discharge from the clinic today.  o Call the office with any concerns  or questions.  · Disposition  o Call or Return if symptoms worsen or persist.  o follow up as needed  o call the office with any questions or concerns            Electronically Signed by: CALISTA Mcfarlane -Author on May 19, 2021 04:41:15 PM

## 2021-06-11 ENCOUNTER — OFFICE VISIT (OUTPATIENT)
Dept: FAMILY MEDICINE CLINIC | Facility: CLINIC | Age: 76
End: 2021-06-11

## 2021-06-11 VITALS
SYSTOLIC BLOOD PRESSURE: 124 MMHG | DIASTOLIC BLOOD PRESSURE: 82 MMHG | OXYGEN SATURATION: 96 % | HEART RATE: 102 BPM | HEIGHT: 67 IN | TEMPERATURE: 96.5 F | BODY MASS INDEX: 33.53 KG/M2 | WEIGHT: 213.6 LBS

## 2021-06-11 DIAGNOSIS — E11.9 CONTROLLED TYPE 2 DIABETES MELLITUS WITHOUT COMPLICATION, UNSPECIFIED WHETHER LONG TERM INSULIN USE (HCC): Chronic | ICD-10-CM

## 2021-06-11 DIAGNOSIS — E55.9 VITAMIN D DEFICIENCY: ICD-10-CM

## 2021-06-11 DIAGNOSIS — J30.2 SEASONAL ALLERGIES: Chronic | ICD-10-CM

## 2021-06-11 DIAGNOSIS — I10 ESSENTIAL HYPERTENSION: Chronic | ICD-10-CM

## 2021-06-11 DIAGNOSIS — Z09 FOLLOW-UP EXAM, 3-6 MONTHS SINCE PREVIOUS EXAM: Primary | ICD-10-CM

## 2021-06-11 DIAGNOSIS — E78.2 HYPERLIPEMIA, MIXED: Chronic | ICD-10-CM

## 2021-06-11 PROBLEM — E78.5 HYPERLIPEMIA: Status: ACTIVE | Noted: 2021-06-11

## 2021-06-11 LAB
25(OH)D3 SERPL-MCNC: 39.2 NG/ML (ref 30–100)
BASOPHILS # BLD AUTO: 0.07 10*3/MM3 (ref 0–0.2)
BASOPHILS NFR BLD AUTO: 0.7 % (ref 0–1.5)
CHOLEST SERPL-MCNC: 156 MG/DL (ref 0–200)
DEPRECATED RDW RBC AUTO: 41 FL (ref 37–54)
EOSINOPHIL # BLD AUTO: 0.21 10*3/MM3 (ref 0–0.4)
EOSINOPHIL NFR BLD AUTO: 2.2 % (ref 0.3–6.2)
ERYTHROCYTE [DISTWIDTH] IN BLOOD BY AUTOMATED COUNT: 13.2 % (ref 12.3–15.4)
HBA1C MFR BLD: 5.61 % (ref 4.8–5.6)
HCT VFR BLD AUTO: 44.3 % (ref 34–46.6)
HDLC SERPL-MCNC: 38 MG/DL (ref 40–60)
HGB BLD-MCNC: 15 G/DL (ref 12–15.9)
IMM GRANULOCYTES # BLD AUTO: 0.02 10*3/MM3 (ref 0–0.05)
IMM GRANULOCYTES NFR BLD AUTO: 0.2 % (ref 0–0.5)
LDLC SERPL CALC-MCNC: 89 MG/DL (ref 0–100)
LDLC/HDLC SERPL: 2.23 {RATIO}
LYMPHOCYTES # BLD AUTO: 3.52 10*3/MM3 (ref 0.7–3.1)
LYMPHOCYTES NFR BLD AUTO: 36.4 % (ref 19.6–45.3)
MCH RBC QN AUTO: 29.1 PG (ref 26.6–33)
MCHC RBC AUTO-ENTMCNC: 33.9 G/DL (ref 31.5–35.7)
MCV RBC AUTO: 86 FL (ref 79–97)
MONOCYTES # BLD AUTO: 0.56 10*3/MM3 (ref 0.1–0.9)
MONOCYTES NFR BLD AUTO: 5.8 % (ref 5–12)
NEUTROPHILS NFR BLD AUTO: 5.3 10*3/MM3 (ref 1.7–7)
NEUTROPHILS NFR BLD AUTO: 54.7 % (ref 42.7–76)
NRBC BLD AUTO-RTO: 0 /100 WBC (ref 0–0.2)
PLATELET # BLD AUTO: 369 10*3/MM3 (ref 140–450)
PMV BLD AUTO: 11 FL (ref 6–12)
RBC # BLD AUTO: 5.15 10*6/MM3 (ref 3.77–5.28)
TRIGL SERPL-MCNC: 166 MG/DL (ref 0–150)
TSH SERPL DL<=0.05 MIU/L-ACNC: 1.98 UIU/ML (ref 0.27–4.2)
VLDLC SERPL-MCNC: 29 MG/DL (ref 5–40)
WBC # BLD AUTO: 9.68 10*3/MM3 (ref 3.4–10.8)

## 2021-06-11 PROCEDURE — 85025 COMPLETE CBC W/AUTO DIFF WBC: CPT | Performed by: NURSE PRACTITIONER

## 2021-06-11 PROCEDURE — 83036 HEMOGLOBIN GLYCOSYLATED A1C: CPT | Performed by: NURSE PRACTITIONER

## 2021-06-11 PROCEDURE — 80061 LIPID PANEL: CPT | Performed by: NURSE PRACTITIONER

## 2021-06-11 PROCEDURE — 82306 VITAMIN D 25 HYDROXY: CPT | Performed by: NURSE PRACTITIONER

## 2021-06-11 PROCEDURE — 82043 UR ALBUMIN QUANTITATIVE: CPT | Performed by: NURSE PRACTITIONER

## 2021-06-11 PROCEDURE — 84443 ASSAY THYROID STIM HORMONE: CPT | Performed by: NURSE PRACTITIONER

## 2021-06-11 PROCEDURE — 80053 COMPREHEN METABOLIC PANEL: CPT | Performed by: NURSE PRACTITIONER

## 2021-06-11 PROCEDURE — 99214 OFFICE O/P EST MOD 30 MIN: CPT | Performed by: NURSE PRACTITIONER

## 2021-06-11 PROCEDURE — 36415 COLL VENOUS BLD VENIPUNCTURE: CPT | Performed by: NURSE PRACTITIONER

## 2021-06-11 RX ORDER — MELOXICAM 7.5 MG/1
1 TABLET ORAL DAILY
COMMUNITY
Start: 2021-03-15 | End: 2021-06-11 | Stop reason: SDUPTHER

## 2021-06-11 RX ORDER — ASCORBIC ACID 500 MG
500 TABLET ORAL 2 TIMES DAILY
Qty: 180 TABLET | Refills: 1 | Status: SHIPPED | OUTPATIENT
Start: 2021-06-11 | End: 2021-09-09

## 2021-06-11 RX ORDER — ASCORBIC ACID 500 MG
1 TABLET ORAL 2 TIMES DAILY
COMMUNITY
End: 2021-06-11 | Stop reason: SDUPTHER

## 2021-06-11 RX ORDER — AZELASTINE 1 MG/ML
1 SPRAY, METERED NASAL 2 TIMES DAILY
COMMUNITY
End: 2021-06-11 | Stop reason: SDUPTHER

## 2021-06-11 RX ORDER — ALBUTEROL SULFATE 90 UG/1
2 AEROSOL, METERED RESPIRATORY (INHALATION) EVERY 6 HOURS
Qty: 8.6 G | Refills: 1 | Status: SHIPPED | OUTPATIENT
Start: 2021-06-11 | End: 2021-12-20 | Stop reason: SDUPTHER

## 2021-06-11 RX ORDER — FLUTICASONE PROPIONATE 50 MCG
1 SPRAY, SUSPENSION (ML) NASAL DAILY
COMMUNITY
End: 2021-06-11 | Stop reason: SDUPTHER

## 2021-06-11 RX ORDER — ASPIRIN 81 MG/1
81 TABLET, CHEWABLE ORAL DAILY
COMMUNITY
End: 2021-06-11 | Stop reason: SDUPTHER

## 2021-06-11 RX ORDER — CYCLOBENZAPRINE HCL 10 MG
10 TABLET ORAL 3 TIMES DAILY
COMMUNITY
Start: 2021-05-19 | End: 2021-06-11 | Stop reason: SDUPTHER

## 2021-06-11 RX ORDER — FEXOFENADINE HCL 180 MG/1
180 TABLET ORAL DAILY
Qty: 90 TABLET | Refills: 1 | Status: SHIPPED | OUTPATIENT
Start: 2021-06-11 | End: 2021-12-20 | Stop reason: SDUPTHER

## 2021-06-11 RX ORDER — FLUTICASONE PROPIONATE AND SALMETEROL XINAFOATE 230; 21 UG/1; UG/1
2 AEROSOL, METERED RESPIRATORY (INHALATION) 2 TIMES DAILY
Qty: 12 G | Refills: 2 | Status: SHIPPED | OUTPATIENT
Start: 2021-06-11 | End: 2021-12-20 | Stop reason: SDUPTHER

## 2021-06-11 RX ORDER — FEXOFENADINE HCL 180 MG/1
1 TABLET ORAL DAILY
COMMUNITY
Start: 2021-03-15 | End: 2021-06-11 | Stop reason: SDUPTHER

## 2021-06-11 RX ORDER — MONTELUKAST SODIUM 10 MG/1
10 TABLET ORAL DAILY
Qty: 90 TABLET | Refills: 1 | Status: SHIPPED | OUTPATIENT
Start: 2021-06-11 | End: 2021-12-20 | Stop reason: SDUPTHER

## 2021-06-11 RX ORDER — MONTELUKAST SODIUM 10 MG/1
1 TABLET ORAL DAILY
COMMUNITY
Start: 2021-04-28 | End: 2021-06-11 | Stop reason: SDUPTHER

## 2021-06-11 RX ORDER — HYDROCHLOROTHIAZIDE 25 MG/1
1 TABLET ORAL DAILY
COMMUNITY
Start: 2021-03-15 | End: 2021-06-11 | Stop reason: SDUPTHER

## 2021-06-11 RX ORDER — FLUTICASONE PROPIONATE AND SALMETEROL XINAFOATE 230; 21 UG/1; UG/1
2 AEROSOL, METERED RESPIRATORY (INHALATION) 2 TIMES DAILY
COMMUNITY
End: 2021-06-11 | Stop reason: SDUPTHER

## 2021-06-11 RX ORDER — POTASSIUM CHLORIDE 20 MEQ/1
1 TABLET, EXTENDED RELEASE ORAL DAILY
COMMUNITY
Start: 2021-04-28 | End: 2021-06-11 | Stop reason: SDUPTHER

## 2021-06-11 RX ORDER — CYCLOBENZAPRINE HCL 10 MG
10 TABLET ORAL 3 TIMES DAILY
Qty: 45 TABLET | Refills: 1 | Status: SHIPPED | OUTPATIENT
Start: 2021-06-11 | End: 2021-12-20 | Stop reason: SDUPTHER

## 2021-06-11 RX ORDER — ERGOCALCIFEROL 1.25 MG/1
50000 CAPSULE ORAL WEEKLY
Qty: 13 CAPSULE | Refills: 1 | Status: SHIPPED | OUTPATIENT
Start: 2021-06-11 | End: 2021-09-09

## 2021-06-11 RX ORDER — PRAVASTATIN SODIUM 10 MG
10 TABLET ORAL DAILY
Qty: 90 TABLET | Refills: 1 | Status: SHIPPED | OUTPATIENT
Start: 2021-06-11 | End: 2021-12-20 | Stop reason: SDUPTHER

## 2021-06-11 RX ORDER — POTASSIUM CHLORIDE 20 MEQ/1
20 TABLET, EXTENDED RELEASE ORAL DAILY
Qty: 90 TABLET | Refills: 1 | Status: SHIPPED | OUTPATIENT
Start: 2021-06-11 | End: 2021-12-20 | Stop reason: SDUPTHER

## 2021-06-11 RX ORDER — MELOXICAM 7.5 MG/1
7.5 TABLET ORAL DAILY
Qty: 90 TABLET | Refills: 1 | Status: SHIPPED | OUTPATIENT
Start: 2021-06-11 | End: 2021-12-20 | Stop reason: SDUPTHER

## 2021-06-11 RX ORDER — ALBUTEROL SULFATE 90 UG/1
2 AEROSOL, METERED RESPIRATORY (INHALATION) EVERY 6 HOURS
COMMUNITY
End: 2021-06-11 | Stop reason: SDUPTHER

## 2021-06-11 RX ORDER — HYDROCHLOROTHIAZIDE 25 MG/1
25 TABLET ORAL DAILY
Qty: 90 TABLET | Refills: 1 | Status: SHIPPED | OUTPATIENT
Start: 2021-06-11 | End: 2021-12-20 | Stop reason: SDUPTHER

## 2021-06-11 RX ORDER — ERGOCALCIFEROL 1.25 MG/1
1 CAPSULE ORAL WEEKLY
COMMUNITY
Start: 2021-03-23 | End: 2021-06-11 | Stop reason: SDUPTHER

## 2021-06-11 RX ORDER — AZELASTINE 1 MG/ML
1 SPRAY, METERED NASAL DAILY
Qty: 30 ML | Refills: 0 | Status: SHIPPED | OUTPATIENT
Start: 2021-06-11 | End: 2021-07-11

## 2021-06-11 RX ORDER — PRAVASTATIN SODIUM 10 MG
1 TABLET ORAL DAILY
COMMUNITY
Start: 2021-03-23 | End: 2021-06-11 | Stop reason: SDUPTHER

## 2021-06-11 RX ORDER — ASPIRIN 81 MG/1
81 TABLET, CHEWABLE ORAL DAILY
Qty: 90 TABLET | Refills: 1 | Status: SHIPPED | OUTPATIENT
Start: 2021-06-11 | End: 2021-12-20 | Stop reason: SDUPTHER

## 2021-06-11 RX ORDER — FLUTICASONE PROPIONATE 50 MCG
1 SPRAY, SUSPENSION (ML) NASAL DAILY
Qty: 1 ML | Refills: 5 | Status: SHIPPED | OUTPATIENT
Start: 2021-06-11 | End: 2021-12-20 | Stop reason: SDUPTHER

## 2021-06-11 NOTE — PROGRESS NOTES
Chief Complaint  Follow-up, Med Refill, and Leg Pain (left leg pain x 1 month (worse in am))    Subjective        Social History     Socioeconomic History   • Marital status:      Spouse name: Not on file   • Number of children: Not on file   • Years of education: Not on file   • Highest education level: Not on file   Tobacco Use   • Smoking status: Never Smoker   • Smokeless tobacco: Never Used   Substance and Sexual Activity   • Alcohol use: Not Currently   • Drug use: Never       Gaby Reyes presents to [facility]  Patient is a 75-year-old female who comes in for 6-month follow-up.  She is a previous history of diabetes, hyperlipidemia, hypertension, seasonal allergy and asthma, and polyarthralgia.  She is due for medication refills and labs.  Last mammogram was July 2019 it was normal.  Depression screening was negative.  She has never smoked.  Blood pressure stable today at 124/82.  She denies any headache, chest pain or change in vision.    Patient has ongoing complaints of left lower extremity pain, she does have pain over the bursa of the left hip with deep palpation she states it does affect the pain that goes down her lateral lower leg.  No swelling or deformity lower extremity.  Neurovascularly intact.  She states it has improved since she was previously seen in May but still continues to hurt her at times.    Diabetes  She has type 2 diabetes mellitus. No MedicAlert identification noted. Her disease course has been stable. There are no hypoglycemic associated symptoms. There are no diabetic associated symptoms. Pertinent negatives for diabetes include no chest pain. There are no hypoglycemic complications. Symptoms are stable. Pertinent negatives for diabetic complications include no CVA or peripheral neuropathy. Risk factors for coronary artery disease include diabetes mellitus, dyslipidemia, hypertension and obesity. Current diabetic treatment includes oral  agent (monotherapy). She is compliant with treatment all of the time. She is following a generally healthy diet. When asked about meal planning, she reported none. She has not had a previous visit with a dietitian. There is no change in her home blood glucose trend. An ACE inhibitor/angiotensin II receptor blocker is not being taken.   Hypertension  This is a chronic problem. The current episode started more than 1 year ago. The problem is unchanged. The problem is controlled. Pertinent negatives include no anxiety, chest pain, peripheral edema or shortness of breath. There are no associated agents to hypertension. Risk factors for coronary artery disease include diabetes mellitus, dyslipidemia, obesity and post-menopausal state. Past treatments include ACE inhibitors and diuretics. Current antihypertension treatment includes diuretics. The current treatment provides significant improvement. There are no compliance problems.  There is no history of angina, CVA or heart failure. There is no history of chronic renal disease or a thyroid problem.   Hyperlipidemia  This is a chronic problem. The current episode started more than 1 year ago. The problem is controlled. She has no history of chronic renal disease. There are no known factors aggravating her hyperlipidemia. Pertinent negatives include no chest pain or shortness of breath. Current antihyperlipidemic treatment includes statins. The current treatment provides significant improvement of lipids. There are no compliance problems.  Risk factors for coronary artery disease include diabetes mellitus, dyslipidemia, hypertension, obesity and post-menopausal.   Muscle Pain  This is a chronic problem. The current episode started more than 1 year ago. The problem occurs daily. The problem has been waxing and waning since onset. The pain is present in the right knee, left knee and lower back. The pain is medium. The symptoms are aggravated by exercise. Associated symptoms  "include stiffness. Pertinent negatives include no chest pain, joint swelling or shortness of breath. Past treatments include prescription NSAID. The treatment provided moderate relief. There is no swelling present. She has been behaving normally.   Sinus Problem  This is a chronic problem. The current episode started more than 1 year ago. The problem has been waxing and waning since onset. There has been no fever. Her pain is at a severity of 0/10. She is experiencing no pain. Pertinent negatives include no congestion, coughing or shortness of breath. Treatments tried: Singulair and Allegra, Flonase. The treatment provided moderate relief.       Objective   Vital Signs:   /82 (BP Location: Right arm, Patient Position: Sitting)   Pulse 102   Temp 96.5 °F (35.8 °C) (Temporal)   Ht 170.2 cm (67\")   Wt 96.9 kg (213 lb 9.6 oz)   SpO2 96%   BMI 33.45 kg/m²     Physical Exam  Vitals reviewed.   Constitutional:       Appearance: Normal appearance. She is well-developed. She is obese.   HENT:      Head: Normocephalic and atraumatic.      Mouth/Throat:      Pharynx: No oropharyngeal exudate.   Eyes:      Conjunctiva/sclera: Conjunctivae normal.      Pupils: Pupils are equal, round, and reactive to light.   Cardiovascular:      Rate and Rhythm: Normal rate and regular rhythm.      Heart sounds: No murmur heard.   No friction rub. No gallop.    Pulmonary:      Effort: Pulmonary effort is normal.      Breath sounds: Normal breath sounds. No wheezing or rhonchi.   Musculoskeletal:      Left hip: Tenderness present. No deformity.   Skin:     General: Skin is warm and dry.   Neurological:      Mental Status: She is alert and oriented to person, place, and time.      Cranial Nerves: No cranial nerve deficit.   Psychiatric:         Mood and Affect: Mood and affect normal.         Behavior: Behavior normal.         Thought Content: Thought content normal.         Judgment: Judgment normal.        Result Review :     CMP  "   CMP 12/9/20   Glucose 92   BUN 12   Creatinine 0.26 (A)   Sodium 138   Potassium 3.4 (A)   Chloride 100   Calcium 9.3   Albumin 4.2   Total Bilirubin 0.53   Alkaline Phosphatase 86   AST (SGOT) 27   ALT (SGPT) 18   (A) Abnormal value            CBC w/diff    CBC w/Diff 12/9/20   WBC 10.11   RBC 5.28   Hemoglobin 14.9   Hematocrit 44.7   MCV 84.7   MCH 28.2   MCHC 33.3   RDW 12.8   Platelets 385   Neutrophil Rel % 49.7   Lymphocyte Rel % 42.5   Monocyte Rel % 4.6   Eosinophil Rel % 2.2   Basophil Rel % 0.7           Lipid Panel    Lipid Panel 12/9/20   Total Cholesterol 146   Triglycerides 126   HDL Cholesterol 42   VLDL Cholesterol 25   LDL Cholesterol  79      Comments are available for some flowsheets but are not being displayed.           TSH    TSH 12/9/20   TSH 2.080           Most Recent A1C    HGBA1C Most Recent 12/9/20   Hemoglobin A1C 5.6      Comments are available for some flowsheets but are not being displayed.                         Assessment and Plan    Diagnoses and all orders for this visit:    1. Follow-up exam, 3-6 months since previous exam (Primary)  -     TSH    2. Controlled type 2 diabetes mellitus without complication, unspecified whether long term insulin use (CMS/[facility])  Comments:  Last hemoglobin A1c is 5.6 patient is well controlled we will repeat labs today adjust medication if needed.  Orders:  -     CBC and differential  -     Comprehensive metabolic panel  -     Hemoglobin A1c  -     Lipid panel  -     TSH  -     MicroAlbumin, Urine, Random - Urine, Clean Catch    3. Essential hypertension  Comments:  Blood pressure stable today we will continue to monitor patient's progress, no swelling in legs or feet.    4. Hyperlipemia, mixed  Comments:  Last cholesterol panel done in December was controlled we will recheck labs today.    5. Seasonal allergies  Comments:  Controlled on medication.    6. Vitamin D deficiency  -     Vitamin D 25 Hydroxy    Other orders  -     albuterol sulfate HFA  108 (90 Base) MCG/ACT inhaler; Inhale 2 puffs Every 6 (Six) Hours.  Dispense: 8.6 g; Refill: 1  -     ascorbic acid (VITAMIN C) 500 MG tablet; Take 1 tablet by mouth 2 (two) times a day for 90 days.  Dispense: 180 tablet; Refill: 1  -     aspirin 81 MG chewable tablet; Chew 1 tablet Daily.  Dispense: 90 tablet; Refill: 1  -     azelastine (ASTELIN) 0.1 % nasal spray; 1 spray into the nostril(s) as directed by provider Daily for 30 days. Use in each nostril as directed  Dispense: 30 mL; Refill: 0  -     calcium carbonate-vitamin d 600-400 MG-UNIT per tablet; Take 1 tablet by mouth 2 (two) times a day.  Dispense: 180 tablet; Refill: 1  -     cyclobenzaprine (FLEXERIL) 10 MG tablet; Take 1 tablet by mouth 3 (Three) Times a Day.  Dispense: 45 tablet; Refill: 1  -     fexofenadine (ALLEGRA) 180 MG tablet; Take 1 tablet by mouth Daily.  Dispense: 90 tablet; Refill: 1  -     fluticasone (FLONASE) 50 MCG/ACT nasal spray; 1 spray into the nostril(s) as directed by provider Daily.  Dispense: 1 mL; Refill: 5  -     fluticasone-salmeterol (Advair HFA) 230-21 MCG/ACT inhaler; Inhale 2 puffs 2 (two) times a day for 90 days.  Dispense: 12 g; Refill: 2  -     hydroCHLOROthiazide (HYDRODIURIL) 25 MG tablet; Take 1 tablet by mouth Daily for 90 days.  Dispense: 90 tablet; Refill: 1  -     meloxicam (MOBIC) 7.5 MG tablet; Take 1 tablet by mouth Daily.  Dispense: 90 tablet; Refill: 1  -     metFORMIN (GLUCOPHAGE) 500 MG tablet; Take 1 tablet by mouth Daily.  Dispense: 90 tablet; Refill: 1  -     montelukast (SINGULAIR) 10 MG tablet; Take 1 tablet by mouth Daily.  Dispense: 90 tablet; Refill: 1  -     potassium chloride (K-DUR,KLOR-CON) 20 MEQ CR tablet; Take 1 tablet by mouth Daily.  Dispense: 90 tablet; Refill: 1  -     pravastatin (PRAVACHOL) 10 MG tablet; Take 1 tablet by mouth Daily.  Dispense: 90 tablet; Refill: 1  -     vitamin D (ERGOCALCIFEROL) 1.25 MG (79129 UT) capsule capsule; Take 1 capsule by mouth 1 (One) Time Per Week for  90 days.  Dispense: 13 capsule; Refill: 1        Follow Up   Return in about 6 months (around 12/11/2021), or if symptoms worsen or fail to improve.  Patient was given instructions and counseling regarding her condition or for health maintenance advice. Please see specific information pulled into the AVS if appropriate.

## 2021-06-12 LAB
ALBUMIN SERPL-MCNC: 4.2 G/DL (ref 3.5–5.2)
ALBUMIN UR-MCNC: <1.2 MG/DL
ALBUMIN/GLOB SERPL: 1.4 G/DL
ALP SERPL-CCNC: 72 U/L (ref 39–117)
ALT SERPL W P-5'-P-CCNC: 10 U/L (ref 1–33)
ANION GAP SERPL CALCULATED.3IONS-SCNC: 10.8 MMOL/L (ref 5–15)
AST SERPL-CCNC: 17 U/L (ref 1–32)
BILIRUB SERPL-MCNC: 0.4 MG/DL (ref 0–1.2)
BUN SERPL-MCNC: 20 MG/DL (ref 8–23)
BUN/CREAT SERPL: 25.3 (ref 7–25)
CALCIUM SPEC-SCNC: 9.4 MG/DL (ref 8.6–10.5)
CHLORIDE SERPL-SCNC: 100 MMOL/L (ref 98–107)
CO2 SERPL-SCNC: 27.2 MMOL/L (ref 22–29)
CREAT SERPL-MCNC: 0.79 MG/DL (ref 0.57–1)
GFR SERPL CREATININE-BSD FRML MDRD: 71 ML/MIN/1.73
GLOBULIN UR ELPH-MCNC: 2.9 GM/DL
GLUCOSE SERPL-MCNC: 89 MG/DL (ref 65–99)
POTASSIUM SERPL-SCNC: 3.9 MMOL/L (ref 3.5–5.2)
PROT SERPL-MCNC: 7.1 G/DL (ref 6–8.5)
SODIUM SERPL-SCNC: 138 MMOL/L (ref 136–145)

## 2021-06-14 ENCOUNTER — TELEPHONE (OUTPATIENT)
Dept: FAMILY MEDICINE CLINIC | Facility: CLINIC | Age: 76
End: 2021-06-14

## 2021-06-14 ENCOUNTER — PROCEDURE VISIT (OUTPATIENT)
Dept: FAMILY MEDICINE CLINIC | Facility: CLINIC | Age: 76
End: 2021-06-14

## 2021-06-14 VITALS
TEMPERATURE: 96.9 F | OXYGEN SATURATION: 96 % | HEIGHT: 67 IN | DIASTOLIC BLOOD PRESSURE: 78 MMHG | HEART RATE: 99 BPM | BODY MASS INDEX: 33.59 KG/M2 | SYSTOLIC BLOOD PRESSURE: 120 MMHG | WEIGHT: 214 LBS

## 2021-06-14 DIAGNOSIS — M70.62 GREATER TROCHANTERIC BURSITIS OF LEFT HIP: Primary | ICD-10-CM

## 2021-06-14 PROCEDURE — 20610 DRAIN/INJ JOINT/BURSA W/O US: CPT | Performed by: NURSE PRACTITIONER

## 2021-06-14 RX ORDER — TRIAMCINOLONE ACETONIDE 40 MG/ML
40 INJECTION, SUSPENSION INTRA-ARTICULAR; INTRAMUSCULAR
Status: COMPLETED | OUTPATIENT
Start: 2021-06-14 | End: 2021-06-14

## 2021-06-14 RX ORDER — LIDOCAINE HYDROCHLORIDE 10 MG/ML
3 INJECTION, SOLUTION INFILTRATION; PERINEURAL
Status: COMPLETED | OUTPATIENT
Start: 2021-06-14 | End: 2021-06-14

## 2021-06-14 RX ADMIN — TRIAMCINOLONE ACETONIDE 40 MG: 40 INJECTION, SUSPENSION INTRA-ARTICULAR; INTRAMUSCULAR at 15:42

## 2021-06-14 RX ADMIN — LIDOCAINE HYDROCHLORIDE 3 ML: 10 INJECTION, SOLUTION INFILTRATION; PERINEURAL at 15:42

## 2021-06-14 NOTE — PATIENT INSTRUCTIONS
·········Hip Bursitis    Hip bursitis is swelling of one or more fluid-filled sacs (bursae) in your hip joint. This condition can cause pain, and your symptoms may come and go over time.  What are the causes?  · Repeated use of your hip muscles.  · Injury to the hip.  · Weak butt muscles.  · Bone spurs.  · Infection.  In some cases, the cause may not be known.  What increases the risk?  You are more likely to develop this condition if:  · You had a past hip injury or hip surgery.  · You have a condition, such as arthritis, gout, diabetes, or thyroid disease.  · You have spine problems.  · You have one leg that is shorter than the other.  · You run a lot or do long-distance running.  · You play sports where there is a risk of injury or falling, such as football, martial arts, or skiing.  What are the signs or symptoms?  Symptoms may come and go, and they often include:  · Pain in the hip or groin area. Pain may get worse when you move your hip.  · Tenderness and swelling of the hip.  In rare cases, the bursa may become infected. If this happens, you may get a fever, as well as have warmth and redness in the hip area.  How is this treated?  This condition is treated by:  · Resting your hip.  · Icing your hip.  · Wrapping the hip area with an elastic bandage (compression wrap).  · Keeping the hip raised.  Other treatments may include medicine, draining fluid out of the bursa, or using crutches, a cane, or a walker. Surgery may be needed, but this is rare.  Long-term treatment may include doing exercises to help your strength and flexibility. It may also include lifestyle changes like losing weight to lessen the strain on your hip.  Follow these instructions at home:  Managing pain, stiffness, and swelling         · If told, put ice on the painful area.  ? Put ice in a plastic bag.  ? Place a towel between your skin and the bag.  ? Leave the ice on for 20 minutes, 2-3 times a day.  · Raise your hip by putting a pillow  under your hips while you lie down. Stop if you feel pain.  · If told, put heat on the affected area. Do this as often as told by your doctor. Use a moist heat pack or a heating pad as told by your doctor.  ? Place a towel between your skin and the heat source.  ? Leave the heat on for 20-30 minutes.  ? Take off the heat if your skin turns bright red. This is very important if you are unable to feel pain, heat, or cold. You may have a greater risk of getting burned.  Activity  · Do not use your hip to support your body weight until your doctor says that you can.  · Use crutches, a cane, or a walker as told by your doctor.  · If the affected leg is one that you use to drive, ask your doctor if it is safe to drive.  · Rest and protect your hip as much as you can until you feel better.  · Return to your normal activities as told by your doctor. Ask your doctor what activities are safe for you.  · Do exercises as told by your doctor.  General instructions  · Take over-the-counter and prescription medicines only as told by your doctor.  · Gently rub and stretch your injured area as often as is comfortable.  · Wear elastic bandages only as told by your doctor.  · If one of your legs is shorter than the other, get fitted for a shoe insert or orthotic.  · Keep a healthy weight. Follow instructions from your doctor.  · Keep all follow-up visits as told by your doctor. This is important.  How is this prevented?  · Exercise regularly, as told by your doctor.  · Wear the right shoes for the sport you play.  · Warm up and stretch before being active. Cool down and stretch after being active.  · Take breaks often from repeated activity.  · Avoid activities that bother your hip or cause pain.  · Avoid sitting down for a long time.  Where to find more information  · [employer]: orthoinfo.aaos.org  Contact a doctor if:  · You have a fever.  · You have new symptoms.  · You have trouble walking or doing  everyday activities.  · You have pain that gets worse or does not get better with medicine.  · Your skin around your hip is red.  · You get a feeling of warmth in your hip area.  Get help right away if:  · You cannot move your hip.  · You have very bad pain.  · You cannot control the muscles in your feet.  Summary  · Hip bursitis is swelling of one or more fluid-filled sacs (bursae) in your hip joint.  · Symptoms often come and go over time.  · This condition is often treated by resting and icing the hip. It also may help to keep the area raised and wrapped in an elastic bandage. Other treatments may be needed.  This information is not intended to replace advice given to you by your health care provider. Make sure you discuss any questions you have with your health care provider.  Document Revised: 10/19/2020 Document Reviewed: 08/26/2019  Elsevier Patient Education © 2021 [employer]

## 2021-06-14 NOTE — PROGRESS NOTES
"Chief Complaint  Procedure (bursitis injection)    Subjective          Gaby Reyes presents to [facility]  Hip Pain   The incident occurred more than 1 week ago. There was no injury mechanism. The pain is present in the left hip (radiates salomon left lower extremity). The quality of the pain is described as aching and shooting. The pain is at a severity of 6/10. The pain is moderate. The pain has been fluctuating since onset. Pertinent negatives include no inability to bear weight or muscle weakness. The symptoms are aggravated by movement. She has tried NSAIDs, elevation and ice (stretches) for the symptoms. The treatment provided mild relief.       Objective   Vital Signs:   /78 (BP Location: Left arm, Patient Position: Sitting)   Pulse 99   Temp 96.9 °F (36.1 °C) (Temporal)   Ht 170.2 cm (67\")   Wt 97.1 kg (214 lb)   SpO2 96%   BMI 33.52 kg/m²     Physical Exam  Constitutional:       General: She is not in acute distress.     Appearance: Normal appearance. She is obese. She is not ill-appearing.   Pulmonary:      Effort: Pulmonary effort is normal.      Breath sounds: Normal breath sounds.   Musculoskeletal:      Left hip: Tenderness and bony tenderness present. No deformity.   Skin:     General: Skin is warm and dry.   Neurological:      Mental Status: She is alert and oriented to person, place, and time.   Psychiatric:         Mood and Affect: Mood normal.         Behavior: Behavior normal.         Thought Content: Thought content normal.         Judgment: Judgment normal.        Result Review :   The following data was reviewed by: CALISTA Mejia on 06/14/2021:  CMP    CMP 12/9/20 6/11/21   Glucose  89   Glucose 92    BUN 12 20   Creatinine 0.26 (A) 0.79   eGFR Non African Am  71   Sodium 138 138   Potassium 3.4 (A) 3.9   Chloride 100 100   Calcium 9.3 9.4   Albumin 4.2 4.20   Total Bilirubin 0.53 0.4   Alkaline Phosphatase 86 72   AST (SGOT) 27 17   ALT " (SGPT) 18 10   (A) Abnormal value            CBC    CBC 12/9/20 6/11/21   WBC 10.11 9.68   RBC 5.28 5.15   Hemoglobin 14.9 15.0   Hematocrit 44.7 44.3   MCV 84.7 86.0   MCH 28.2 29.1   MCHC 33.3 33.9   RDW 12.8 13.2   Platelets 385 369           Lipid Panel    Lipid Panel 12/9/20 6/11/21   Total Cholesterol  156   Total Cholesterol 146    Triglycerides 126 166 (A)   HDL Cholesterol 42 38 (A)   VLDL Cholesterol 25 29   LDL Cholesterol  79 89   LDL/HDL Ratio  2.23   (A) Abnormal value       Comments are available for some flowsheets but are not being displayed.           Most Recent A1C    HGBA1C Most Recent 6/11/21   Hemoglobin A1C 5.61 (A)   (A) Abnormal value               Injection Tendon or Ligament    Date/Time: 6/14/2021 3:42 PM  Performed by: Ramiro Mccloud APRN  Authorized by: Ramiro Mccloud APRN   Preparation: Patient was prepped and draped in the usual sterile fashion.  Local anesthesia used: yes  Anesthesia: local infiltration    Anesthesia:  Local anesthesia used: yes  Local Anesthetic: lidocaine 1% with epinephrine and topical anesthetic    Sedation:  Patient sedated: no    Patient tolerance: patient tolerated the procedure well with no immediate complications  Comments: Procedure area was cleaned with Betadine  Timeout was done per myself and Tiffanie Ramos MA, consent was signed  Injections in the left bursa in a fanning technique total of 4 injections altogether  Area was cleaned and sterile bandages applied  Patient recommended to massage the area and move her hip, avoid excessive hip movements over the next 2 weeks continue with anti-inflammatories heat/ice and stretches and follow-up in 2 weeks.  Medications administered: 3 mL lidocaine 1 %; 40 mg triamcinolone acetonide 40 MG/ML            Assessment and Plan    Diagnoses and all orders for this visit:    1. Greater trochanteric bursitis of left hip (Primary)  Comments:  Injections done today fanning over the bursa of the left  hip patient to follow-up in 2 weeks continue stretches return precautions given.    Other orders  -     Ambulatory Epidural Steroid Injection  -     Injection Tendon or Ligament        Follow Up   Return in about 2 weeks (around 6/28/2021) for Recheck.  Patient was given instructions and counseling regarding her condition or for health maintenance advice. Please see specific information pulled into the AVS if appropriate.

## 2021-06-14 NOTE — TELEPHONE ENCOUNTER
----- Message from CALISTA Mejia sent at 6/14/2021  7:16 AM EDT -----  Vitamin D back to normal hemoglobin A1c remains stable at 5.6 which means you are having good control over the diabetes. White count does not show any acute findings. Triglycerides are slightly elevated 6 months ago they were 126 today they are 166. Watch her carbohydrates in excess of sugars that will bring the triglycerides down naturally. Good cholesterol is low needs to be above 40 years is 38. Recommend increase activity and exercise. No adjustment to medication at this time.

## 2021-06-14 NOTE — TELEPHONE ENCOUNTER
Spoke with patient, pt informed of results. Pt wanting to clarify is she should continue taking vitamin D weekly supplement since her levels were normal .please advise.

## 2021-06-28 ENCOUNTER — OFFICE VISIT (OUTPATIENT)
Dept: FAMILY MEDICINE CLINIC | Facility: CLINIC | Age: 76
End: 2021-06-28

## 2021-06-28 VITALS
HEART RATE: 85 BPM | OXYGEN SATURATION: 96 % | HEIGHT: 67 IN | WEIGHT: 212.6 LBS | SYSTOLIC BLOOD PRESSURE: 126 MMHG | DIASTOLIC BLOOD PRESSURE: 84 MMHG | TEMPERATURE: 96.4 F | BODY MASS INDEX: 33.37 KG/M2

## 2021-06-28 DIAGNOSIS — M70.72 BURSITIS OF LEFT HIP, UNSPECIFIED BURSA: Primary | ICD-10-CM

## 2021-06-28 DIAGNOSIS — Z09 FOLLOW UP: ICD-10-CM

## 2021-06-28 PROCEDURE — 99212 OFFICE O/P EST SF 10 MIN: CPT | Performed by: NURSE PRACTITIONER

## 2021-06-28 NOTE — PROGRESS NOTES
"Chief Complaint  Follow-up and Bursitis    Subjective          Gaby Reyes presents to [facility]  Follow-up appointment: 2-week follow-up after getting injections of the left hip for bursitis.  Patient received steroid injections, overall is feeling much better.    Hip Pain   The incident occurred more than 1 week ago (follow up). The injury mechanism is unknown. The pain is present in the left hip. The quality of the pain is described as aching and shooting (prior to bursitis steroid injection). The patient is experiencing no pain (after injection, ). Treatments tried: bursistis steroid injection. The treatment provided significant relief.       Objective   Vital Signs:   /84 (BP Location: Right arm, Patient Position: Sitting)   Pulse 85   Temp 96.4 °F (35.8 °C) (Temporal)   Ht 170.2 cm (67\")   Wt 96.4 kg (212 lb 9.6 oz)   SpO2 96%   BMI 33.30 kg/m²     Physical Exam  Vitals reviewed.   Constitutional:       Appearance: Normal appearance. She is well-developed.   HENT:      Head: Normocephalic and atraumatic.   Eyes:      Conjunctiva/sclera: Conjunctivae normal.      Pupils: Pupils are equal, round, and reactive to light.   Cardiovascular:      Rate and Rhythm: Normal rate and regular rhythm.      Heart sounds: No murmur heard.   No friction rub. No gallop.    Pulmonary:      Effort: Pulmonary effort is normal.      Breath sounds: No wheezing or rhonchi.   Skin:     General: Skin is warm and dry.   Neurological:      Mental Status: She is alert and oriented to person, place, and time.   Psychiatric:         Mood and Affect: Mood and affect normal.         Behavior: Behavior normal.         Thought Content: Thought content normal.         Judgment: Judgment normal.        Result Review :   The following data was reviewed by: CALISTA Mejia on 06/28/2021:  Common labs    Common Labsle 12/9/20 12/9/20 6/11/21 6/11/21 6/11/21 6/11/21 6/11/21    2107 2107 1614 " 1614 1614 1614 1614   Glucose      89    Glucose  92        BUN  12    20    Creatinine  0.26 (A)    0.79    eGFR Non African Am      71    Sodium  138    138    Potassium  3.4 (A)    3.9    Chloride  100    100    Calcium  9.3    9.4    Albumin  4.2    4.20    Total Bilirubin  0.53    0.4    Alkaline Phosphatase  86    72    AST (SGOT)  27    17    ALT (SGPT)  18    10    WBC 10.11   9.68      Hemoglobin 14.9   15.0      Hematocrit 44.7   44.3      Platelets 385   369      Total Cholesterol     156     Total Cholesterol  146        Triglycerides  126   166 (A)     HDL Cholesterol  42   38 (A)     LDL Cholesterol   79   89     Hemoglobin A1C  5.6 5.61 (A)       Microalbumin, Urine       <1.2   (A) Abnormal value       Comments are available for some flowsheets but are not being displayed.                     Assessment and Plan    Diagnoses and all orders for this visit:    1. Bursitis of left hip, unspecified bursa (Primary)  Comments:  Great improvment with the steroid injection     2. Follow up        Follow Up   Return if symptoms worsen or fail to improve.  Patient was given instructions and counseling regarding her condition or for health maintenance advice. Please see specific information pulled into the AVS if appropriate.

## 2021-06-28 NOTE — PATIENT INSTRUCTIONS
Bursitis    Bursitis is when the fluid-filled sac (bursa) that covers and protects a joint is swollen (inflamed). Bursitis is most common near joints such as the knees, elbows, hips, and shoulders. It can cause pain and stiffness.  Follow these instructions at home:  Medicines  · Take over-the-counter and prescription medicines only as told by your doctor.  · If you were prescribed an antibiotic medicine, take it as told by your doctor. Do not stop taking it even if you start to feel better.  General instructions    · Rest the affected area as told by your doctor.  ? If you can, raise (elevate) the affected area above the level of your heart while you are sitting or lying down.  ? Avoid doing things that make the pain worse.  · Use a splint, brace, pad, or walking aid as told by your doctor.  · If directed, put ice on the affected area:  ? If you have a removable splint or brace, take it off as told by your doctor.  ? Put ice in a plastic bag.  ? Place a towel between your skin and the bag, or between the splint or brace and the bag.  ? Leave the ice on for 20 minutes, 2-3 times a day.  · Keep all follow-up visits as told by your doctor. This is important.  Preventing symptoms  Do these things to help you not have symptoms again:  · Wear knee pads if you kneel often.  · Wear sturdy running or walking shoes that fit you well.  · Take a lot of breaks during activities that involve doing the same movements again and again.  · Before you do any activity that takes a lot of effort, get your body ready by stretching.  · Stay at a healthy weight or lose weight if your doctor says you should. If you need help doing this, ask your doctor.  · Exercise often. If you start any new physical activity, do it slowly.  Contact a doctor if you:  · Have a fever.  · Have chills.  · Have symptoms that do not get better with treatment or home care.  Summary  · Bursitis is when the fluid-filled sac (bursa) that covers and protects a joint  is swollen.  · Rest the affected area as told by your doctor.  · Avoid doing things that make the pain worse.  · Put ice on the affected area as told by your doctor.  This information is not intended to replace advice given to you by your health care provider. Make sure you discuss any questions you have with your health care provider.  Document Revised: 11/30/2018 Document Reviewed: 11/02/2018  Elsevier Patient Education © 2021 [employer]    Hip Bursitis    Hip bursitis is swelling of one or more fluid-filled sacs (bursae) in your hip joint. This condition can cause pain, and your symptoms may come and go over time.  What are the causes?  · Repeated use of your hip muscles.  · Injury to the hip.  · Weak butt muscles.  · Bone spurs.  · Infection.  In some cases, the cause may not be known.  What increases the risk?  You are more likely to develop this condition if:  · You had a past hip injury or hip surgery.  · You have a condition, such as arthritis, gout, diabetes, or thyroid disease.  · You have spine problems.  · You have one leg that is shorter than the other.  · You run a lot or do long-distance running.  · You play sports where there is a risk of injury or falling, such as football, martial arts, or skiing.  What are the signs or symptoms?  Symptoms may come and go, and they often include:  · Pain in the hip or groin area. Pain may get worse when you move your hip.  · Tenderness and swelling of the hip.  In rare cases, the bursa may become infected. If this happens, you may get a fever, as well as have warmth and redness in the hip area.  How is this treated?  This condition is treated by:  · Resting your hip.  · Icing your hip.  · Wrapping the hip area with an elastic bandage (compression wrap).  · Keeping the hip raised.  Other treatments may include medicine, draining fluid out of the bursa, or using crutches, a cane, or a walker. Surgery may be needed, but this is rare.  Long-term treatment may  include doing exercises to help your strength and flexibility. It may also include lifestyle changes like losing weight to lessen the strain on your hip.  Follow these instructions at home:  Managing pain, stiffness, and swelling         · If told, put ice on the painful area.  ? Put ice in a plastic bag.  ? Place a towel between your skin and the bag.  ? Leave the ice on for 20 minutes, 2-3 times a day.  · Raise your hip by putting a pillow under your hips while you lie down. Stop if you feel pain.  · If told, put heat on the affected area. Do this as often as told by your doctor. Use a moist heat pack or a heating pad as told by your doctor.  ? Place a towel between your skin and the heat source.  ? Leave the heat on for 20-30 minutes.  ? Take off the heat if your skin turns bright red. This is very important if you are unable to feel pain, heat, or cold. You may have a greater risk of getting burned.  Activity  · Do not use your hip to support your body weight until your doctor says that you can.  · Use crutches, a cane, or a walker as told by your doctor.  · If the affected leg is one that you use to drive, ask your doctor if it is safe to drive.  · Rest and protect your hip as much as you can until you feel better.  · Return to your normal activities as told by your doctor. Ask your doctor what activities are safe for you.  · Do exercises as told by your doctor.  General instructions  · Take over-the-counter and prescription medicines only as told by your doctor.  · Gently rub and stretch your injured area as often as is comfortable.  · Wear elastic bandages only as told by your doctor.  · If one of your legs is shorter than the other, get fitted for a shoe insert or orthotic.  · Keep a healthy weight. Follow instructions from your doctor.  · Keep all follow-up visits as told by your doctor. This is important.  How is this prevented?  · Exercise regularly, as told by your doctor.  · Wear the right shoes for the  sport you play.  · Warm up and stretch before being active. Cool down and stretch after being active.  · Take breaks often from repeated activity.  · Avoid activities that bother your hip or cause pain.  · Avoid sitting down for a long time.  Where to find more information  · [employer]: orthoinfo.aaos.org  Contact a doctor if:  · You have a fever.  · You have new symptoms.  · You have trouble walking or doing everyday activities.  · You have pain that gets worse or does not get better with medicine.  · Your skin around your hip is red.  · You get a feeling of warmth in your hip area.  Get help right away if:  · You cannot move your hip.  · You have very bad pain.  · You cannot control the muscles in your feet.  Summary  · Hip bursitis is swelling of one or more fluid-filled sacs (bursae) in your hip joint.  · Symptoms often come and go over time.  · This condition is often treated by resting and icing the hip. It also may help to keep the area raised and wrapped in an elastic bandage. Other treatments may be needed.  This information is not intended to replace advice given to you by your health care provider. Make sure you discuss any questions you have with your health care provider.  Document Revised: 10/19/2020 Document Reviewed: 08/26/2019  Elsevier Patient Education © 2021 [employer]

## 2021-07-15 VITALS
HEIGHT: 67 IN | HEART RATE: 76 BPM | SYSTOLIC BLOOD PRESSURE: 122 MMHG | TEMPERATURE: 97.2 F | WEIGHT: 211.37 LBS | BODY MASS INDEX: 33.18 KG/M2 | DIASTOLIC BLOOD PRESSURE: 84 MMHG | OXYGEN SATURATION: 97 %

## 2021-07-22 DIAGNOSIS — M25.551 RIGHT HIP PAIN: Primary | ICD-10-CM

## 2021-08-17 ENCOUNTER — OFFICE VISIT (OUTPATIENT)
Dept: ORTHOPEDIC SURGERY | Facility: CLINIC | Age: 76
End: 2021-08-17

## 2021-08-17 VITALS — BODY MASS INDEX: 33.49 KG/M2 | HEIGHT: 67 IN | OXYGEN SATURATION: 97 % | WEIGHT: 213.4 LBS | HEART RATE: 76 BPM

## 2021-08-17 DIAGNOSIS — M70.62 TROCHANTERIC BURSITIS OF LEFT HIP: ICD-10-CM

## 2021-08-17 DIAGNOSIS — M25.552 LEFT HIP PAIN: Primary | ICD-10-CM

## 2021-08-17 DIAGNOSIS — M54.32 SCIATICA OF LEFT SIDE: ICD-10-CM

## 2021-08-17 PROCEDURE — 99213 OFFICE O/P EST LOW 20 MIN: CPT | Performed by: ORTHOPAEDIC SURGERY

## 2021-08-17 NOTE — PROGRESS NOTES
"Chief Complaint  Initial Evaluation and Pain of the Left Hip     Subjective      Gaby Reyes presents to [facility] for an evaluation of left hip. Patient states left hip pain started about 2-3 months ago with no known injury or trauma. She states pain on the lateral aspect of her left hip. She denies any groin pain. She states PCP gave her steroid injection and at exercises that has provided her with relief. She states pain radiates down her leg.     No Known Allergies     Social History     Socioeconomic History   • Marital status:      Spouse name: Not on file   • Number of children: Not on file   • Years of education: Not on file   • Highest education level: Not on file   Tobacco Use   • Smoking status: Never Smoker   • Smokeless tobacco: Never Used   Substance and Sexual Activity   • Alcohol use: Not Currently   • Drug use: Never        Review of Systems     Objective   Vital Signs:   Pulse 76   Ht 170.2 cm (67\")   Wt 96.8 kg (213 lb 6.4 oz)   SpO2 97%   BMI 33.42 kg/m²       Physical Exam  Constitutional:       Appearance: Normal appearance. He is well-developed and normal weight.   HENT:      Head: Normocephalic.      Right Ear: Hearing and external ear normal.      Left Ear: Hearing and external ear normal.      Nose: Nose normal.   Eyes:      Conjunctiva/sclera: Conjunctivae normal.   Cardiovascular:      Rate and Rhythm: Normal rate.   Pulmonary:      Effort: Pulmonary effort is normal.      Breath sounds: No wheezing or rales.   Abdominal:      Palpations: Abdomen is soft.      Tenderness: There is no abdominal tenderness.   Musculoskeletal:      Cervical back: Normal range of motion.   Skin:     Findings: No rash.   Neurological:      Mental Status: He is alert and oriented to person, place, and time.   Psychiatric:         Mood and Affect: Mood and affect normal.         Judgment: Judgment normal.       Ortho Exam      LEFT HIP: Full weight bearing. Mildly " antalgic gait. Good tone of hip flexors, hip extensors, hip adductor, hip abductors. Sensation grossly intact. Neurovascular intact. Calf supple, non-tender. Good strength to hamstrings, quadriceps, dorsiflexors and plantar flexors. Dorsal Pedal Pulse 2+, posterior tibialis pulse 2+. Tender greater trochanter. Full passive hip range of motion. No groin pain with hip range of motion. No swelling, skin discoloration or atrophy. Tender sciatic nerve.     Procedures      Imaging Results (Most Recent)     Procedure Component Value Units Date/Time    XR Hip With or Without Pelvis 2 - 3 View Left [[id number]] Resulted: 08/17/21 1430     Updated: 08/17/21 1432           Result Review :       X-Ray Report:  Left hip(s) X-Ray  Indication: Evaluation of left hip  AP and Lateral view(s)  Findings: There are mild degenerative changes present. No fracture or dislocation.   Prior studies available for comparison: no     Assessment and Plan     DX: Left hip pain  Left hip trochanteric bursitis  Sciatica     Discussed treatment plans and diagnosis with the patient. Pain in the left hip is resolving. She will continue activities as tolerated. A prescription for PT was provided today.     Call or return if worsening symptoms.    Follow Up     PRN.       Patient was given instructions and counseling regarding her condition or for health maintenance advice. Please see specific information pulled into the AVS if appropriate.     Scribed for Christy Noe MD by Marcia Schwartz.  08/17/21   14:26 EDT

## 2021-08-22 NOTE — PROGRESS NOTES
"Chief Complaint  pain in lower, right side of back    Subjective          Gaby Reyes presents to [facility]  History of Present Illness  She is here for an acute visit today, she is a patient CALIST[facility].  She is complaining of right sided back pain for 1 week.  She states she thought she pulled a muscle in her lower right side last Thursday as she felt pain after she bent over.  She states she could not straighten up at the time.  She has been taking Mobic 7-1/2 mg daily and taking Flexeril as needed and she states it is feeling better today.  She denies any need for any medications today and just wants to know some exercises she can do to help.  She denies the pain radiating anywhere.  Objective   Vital Signs:   /80   Pulse 73   Temp 98.2 °F (36.8 °C)   Ht 170.2 cm (67\")   Wt 96 kg (211 lb 9.6 oz)   SpO2 95%   BMI 33.14 kg/m²     Physical Exam  Vitals reviewed.   Constitutional:       Appearance: Normal appearance. She is well-developed.   Cardiovascular:      Rate and Rhythm: Normal rate and regular rhythm.      Heart sounds: No murmur heard.   No friction rub. No gallop.    Pulmonary:      Effort: Pulmonary effort is normal.      Breath sounds: Normal breath sounds. No wheezing or rhonchi.   Musculoskeletal:      Lumbar back: Spasms present. No swelling or tenderness.   Skin:     General: Skin is warm and dry.   Neurological:      Mental Status: She is alert and oriented to person, place, and time.      Cranial Nerves: No cranial nerve deficit.   Psychiatric:         Mood and Affect: Mood and affect normal.         Behavior: Behavior normal.         Thought Content: Thought content normal. Thought content does not include homicidal or suicidal ideation.         Judgment: Judgment normal.        Result Review :                 Assessment and Plan    Diagnoses and all orders for this visit:    1. Acute right-sided low back pain without sciatica " (Primary)  Comments:  Advised that she can increase Mobic 7/2 mg to twice daily if needed and continue Flexeril as needed.  Back pain exercises provided in handout, see AVS form.    Advised to follow-up if not improving within a couple weeks or any worsening of symptoms.    Follow Up   Return if symptoms worsen or fail to improve.  Patient was given instructions and counseling regarding her condition or for health maintenance advice. Please see specific information pulled into the AVS if appropriate.

## 2021-08-23 ENCOUNTER — OFFICE VISIT (OUTPATIENT)
Dept: FAMILY MEDICINE CLINIC | Facility: CLINIC | Age: 76
End: 2021-08-23

## 2021-08-23 VITALS
BODY MASS INDEX: 33.21 KG/M2 | HEIGHT: 67 IN | WEIGHT: 211.6 LBS | SYSTOLIC BLOOD PRESSURE: 134 MMHG | OXYGEN SATURATION: 95 % | DIASTOLIC BLOOD PRESSURE: 80 MMHG | HEART RATE: 73 BPM | TEMPERATURE: 98.2 F

## 2021-08-23 DIAGNOSIS — M54.50 ACUTE RIGHT-SIDED LOW BACK PAIN WITHOUT SCIATICA: Primary | ICD-10-CM

## 2021-08-23 PROCEDURE — 99213 OFFICE O/P EST LOW 20 MIN: CPT | Performed by: NURSE PRACTITIONER

## 2021-08-23 NOTE — PATIENT INSTRUCTIONS
Acute Back Pain, Adult  Acute back pain is sudden and usually short-lived. It is often caused by an injury to the muscles and tissues in the back. The injury may result from:  · A muscle or ligament getting overstretched or torn (strained). Ligaments are tissues that connect bones to each other. Lifting something improperly can cause a back strain.  · Wear and tear (degeneration) of the spinal disks. Spinal disks are circular tissue that provide cushioning between the bones of the spine (vertebrae).  · Twisting motions, such as while playing sports or doing yard work.  · A hit to the back.  · Arthritis.  You may have a physical exam, lab tests, and imaging tests to find the cause of your pain. Acute back pain usually goes away with rest and home care.  Follow these instructions at home:  Managing pain, stiffness, and swelling  · Treatment may include medicines for pain and inflammation that are taken by mouth or applied to the skin, prescription pain medicine, or muscle relaxants. Take over-the-counter and prescription medicines only as told by your health care provider.  · Your health care provider may recommend applying ice during the first 24-48 hours after your pain starts. To do this:  ? Put ice in a plastic bag.  ? Place a towel between your skin and the bag.  ? Leave the ice on for 20 minutes, 2-3 times a day.  · If directed, apply heat to the affected area as often as told by your health care provider. Use the heat source that your health care provider recommends, such as a moist heat pack or a heating pad.  ? Place a towel between your skin and the heat source.  ? Leave the heat on for 20-30 minutes.  ? Remove the heat if your skin turns bright red. This is especially important if you are unable to feel pain, heat, or cold. You have a greater risk of getting burned.  Activity    · Do not stay in bed. Staying in bed for more than 1-2 days can delay your recovery.  · Sit up and stand up straight. Avoid  leaning forward when you sit or hunching over when you stand.  ? If you work at a desk, sit close to it so you do not need to lean over. Keep your chin tucked in. Keep your neck drawn back, and keep your elbows bent at a 90-degree angle (right angle).  ? Sit high and close to the steering wheel when you drive. Add lower back (lumbar) support to your car seat, if needed.  · Take short walks on even surfaces as soon as you are able. Try to increase the length of time you walk each day.  · Do not sit, drive, or  one place for more than 30 minutes at a time. Sitting or standing for long periods of time can put stress on your back.  · Do not drive or use heavy machinery while taking prescription pain medicine.  · Use proper lifting techniques. When you bend and lift, use positions that put less stress on your back:  ? Bend your knees.  ? Keep the load close to your body.  ? Avoid twisting.  · Exercise regularly as told by your health care provider. Exercising helps your back heal faster and helps prevent back injuries by keeping muscles strong and flexible.  · Work with a physical therapist to make a safe exercise program, as recommended by your health care provider. Do any exercises as told by your physical therapist.  Lifestyle  · Maintain a healthy weight. Extra weight puts stress on your back and makes it difficult to have good posture.  · Avoid activities or situations that make you feel anxious or stressed. Stress and anxiety increase muscle tension and can make back pain worse. Learn ways to manage anxiety and stress, such as through exercise.  General instructions  · Sleep on a firm mattress in a comfortable position. Try lying on your side with your knees slightly bent. If you lie on your back, put a pillow under your knees.  · Follow your treatment plan as told by your health care provider. This may include:  ? Cognitive or behavioral therapy.  ? Acupuncture or massage therapy.  ? Meditation or  yoga.  Contact a health care provider if:  · You have pain that is not relieved with rest or medicine.  · You have increasing pain going down into your legs or buttocks.  · Your pain does not improve after 2 weeks.  · You have pain at night.  · You lose weight without trying.  · You have a fever or chills.  Get help right away if:  · You develop new bowel or bladder control problems.  · You have unusual weakness or numbness in your arms or legs.  · You develop nausea or vomiting.  · You develop abdominal pain.  · You feel faint.  Summary  · Acute back pain is sudden and usually short-lived.  · Use proper lifting techniques. When you bend and lift, use positions that put less stress on your back.  · Take over-the-counter and prescription medicines and apply heat or ice as directed by your health care provider.  This information is not intended to replace advice given to you by your health care provider. Make sure you discuss any questions you have with your health care provider.  Document Revised: 12/11/2020 Document Reviewed: 08/01/2018  [employer] Patient Education © 2021 [email or web address]    Back Exercises  The following exercises strengthen the muscles that help to support the trunk and back. They also help to keep the lower back flexible. Doing these exercises can help to prevent back pain or lessen existing pain.  · If you have back pain or discomfort, try doing these exercises 2-3 times each day or as told by your health care provider.  · As your pain improves, do them once each day, but increase the number of times that you repeat the steps for each exercise (do more repetitions).  · To prevent the recurrence of back pain, continue to do these exercises once each day or as told by your health care provider.  Do exercises exactly as told by your health care provider and adjust them as directed. It is normal to feel mild stretching, pulling, tightness, or discomfort as you do these exercises, but you should stop  right away if you feel sudden pain or your pain gets worse.  Exercises  Single knee to chest  Repeat these steps 3-5 times for each le. Lie on your back on a firm bed or the floor with your legs extended.  2. Bring one knee to your chest. Your other leg should stay extended and in contact with the floor.  3. Hold your knee in place by grabbing your knee or thigh with both hands and hold.  4. Pull on your knee until you feel a gentle stretch in your lower back or buttocks.  5. Hold the stretch for 10-30 seconds.  6. Slowly release and straighten your leg.  Pelvic tilt  Repeat these steps 5-10 times:  1. Lie on your back on a firm bed or the floor with your legs extended.  2. Bend your knees so they are pointing toward the ceiling and your feet are flat on the floor.  3. Tighten your lower abdominal muscles to press your lower back against the floor. This motion will tilt your pelvis so your tailbone points up toward the ceiling instead of pointing to your feet or the floor.  4. With gentle tension and even breathing, hold this position for 5-10 seconds.  Cat-cow  Repeat these steps until your lower back becomes more flexible:  1. Get into a hands-and-knees position on a firm surface. Keep your hands under your shoulders, and keep your knees under your hips. You may place padding under your knees for comfort.  2. Let your head hang down toward your chest. Contract your abdominal muscles and point your tailbone toward the floor so your lower back becomes rounded like the back of a cat.  3. Hold this position for 5 seconds.  4. Slowly lift your head, let your abdominal muscles relax and point your tailbone up toward the ceiling so your back forms a sagging arch like the back of a cow.  5. Hold this position for 5 seconds.    Press-ups  Repeat these steps 5-10 times:  1. Lie on your abdomen (face-down) on the floor.  2. Place your palms near your head, about shoulder-width apart.  3. Keeping your back as relaxed as  possible and keeping your hips on the floor, slowly straighten your arms to raise the top half of your body and lift your shoulders. Do not use your back muscles to raise your upper torso. You may adjust the placement of your hands to make yourself more comfortable.  4. Hold this position for 5 seconds while you keep your back relaxed.  5. Slowly return to lying flat on the floor.    Bridges  Repeat these steps 10 times:  1. Lie on your back on a firm surface.  2. Bend your knees so they are pointing toward the ceiling and your feet are flat on the floor. Your arms should be flat at your sides, next to your body.  3. Tighten your buttocks muscles and lift your buttocks off the floor until your waist is at almost the same height as your knees. You should feel the muscles working in your buttocks and the back of your thighs. If you do not feel these muscles, slide your feet 1-2 inches farther away from your buttocks.  4. Hold this position for 3-5 seconds.  5. Slowly lower your hips to the starting position, and allow your buttocks muscles to relax completely.  If this exercise is too easy, try doing it with your arms crossed over your chest.  Abdominal crunches  Repeat these steps 5-10 times:  1. Lie on your back on a firm bed or the floor with your legs extended.  2. Bend your knees so they are pointing toward the ceiling and your feet are flat on the floor.  3. Cross your arms over your chest.  4. Tip your chin slightly toward your chest without bending your neck.  5. Tighten your abdominal muscles and slowly raise your trunk (torso) high enough to lift your shoulder blades a tiny bit off the floor. Avoid raising your torso higher than that because it can put too much stress on your low back and does not help to strengthen your abdominal muscles.  6. Slowly return to your starting position.  Back lifts  Repeat these steps 5-10 times:  1. Lie on your abdomen (face-down) with your arms at your sides, and rest your  forehead on the floor.  2. Tighten the muscles in your legs and your buttocks.  3. Slowly lift your chest off the floor while you keep your hips pressed to the floor. Keep the back of your head in line with the curve in your back. Your eyes should be looking at the floor.  4. Hold this position for 3-5 seconds.  5. Slowly return to your starting position.  Contact a health care provider if:  · Your back pain or discomfort gets much worse when you do an exercise.  · Your worsening back pain or discomfort does not lessen within 2 hours after you exercise.  If you have any of these problems, stop doing these exercises right away. Do not do them again unless your health care provider says that you can.  Get help right away if:  · You develop sudden, severe back pain. If this happens, stop doing the exercises right away. Do not do them again unless your health care provider says that you can.  This information is not intended to replace advice given to you by your health care provider. Make sure you discuss any questions you have with your health care provider.  Document Revised: 04/23/2020 Document Reviewed: 09/19/2019  [employer] Patient Education © 2021 [employer]

## 2021-09-09 ENCOUNTER — TELEPHONE (OUTPATIENT)
Dept: FAMILY MEDICINE CLINIC | Facility: CLINIC | Age: 76
End: 2021-09-09

## 2021-09-09 NOTE — TELEPHONE ENCOUNTER
Yes that is fine make sure she is 5 to 7 days out from exposure or she will not get an accurate test

## 2021-09-09 NOTE — TELEPHONE ENCOUNTER
Caller: Gaby Reyes    Relationship: Self    Best call back number: [phone]    What is the medical concern/diagnosis: COVID EXPOSURE, NO SYMPTOMS    What specialty or service is being requested: RAPID COVID TEST REFERRAL    What is the provider, practice or medical service name: [facility]    What is the office location: UNKNOWN, [facility]    Any additional details: PATIENT IS WANTING A RAPID COVID TEST, CONTACTED THE HOSPITAL AND THEY WILL NOT DO IT WITHOUT A REFERRAL. PATIENT WOULD PREFER A [facility] TEST AND NOT HAVE TO GO INTO AN OFFICE.

## 2021-09-13 NOTE — TELEPHONE ENCOUNTER
Pt was informed, states she ended up going to the [facility] clinic over the weekend so order is no longer needed.

## 2021-10-13 NOTE — PROGRESS NOTES
Progress Note      Patient Name: Gaby Reyes   Patient ID: [id number]   Sex: Female   YOB: 1945    Primary Care Provider: Ramiro GRIGSBY   Referring Provider: Ramiro GRIGSBY    Visit Date: June 1, 2020    Provider: CALISTA Mcfarlane   Location: [facility]   Location Address: [address] [place] [address], [address]  [id number]   Location Phone: [phone]          Chief Complaint  · follow up  · constipation  · 6-month follow-up       History Of Present Illness  Gaby Reyes is a 74 year old /White female who presents for evaluation and treatment of:      Patient is a 74-year-old female who comes in for six-month follow-up.  She is a previous history of hyperlipidemia, type 2 diabetes, hypertension, allergies and asthma.  Last hemoglobin A1c was done in November was 5.8 at that time, her diabetes typically are well controlled.  Blood pressure today is 116/86, she is stable, denies any chest pain, headache, change in vision.  She is stable medication just needing refills and her labs checked.    Patient has been complaining of constipation over the last 3 to 4 weeks.  She states she initially started taking Colace twice daily and was not seen a significant amount of improvement.  She started doing the Metamucil capsules twice daily with more water, she states that she noticed a difference but if she did not take it daily then she would be constipated again.  She also complained of some cramping when she did take the medication.  She does admit to not eating a well-balanced diet right now, she states she does need to do a better job than water and fiber intake.       Past Medical History  Disease Name Date Onset Notes   Allergic rhinitis 10/30/2014 --    Arthritis --  --    Asthma --  stopped in 2003   Bronchitis --  --    Deafness --  --    Gall Stones 1997 --    High blood pressure --  --    High cholesterol --  --    Hyperlipemia --  --    Hyperlipidemia  02/08/2018 --    Hypertension --  --    Impaired fasting glucose 04/28/2014 04/28/2014    Knee pain, chronic, right 01/29/2015 --    Osteopenia 11/2015 L1 lumbar spine   Pneumonia --  --    Pressure ulcer, stage 1 --  --    Reflux --  --    Screening for colon cancer 12/18/2014 --    Seasonal allergies --  --    Sinus Trouble; unspecified --  --    Type 2 diabetes mellitus without complication --  --          Past Surgical History  Procedure Name Date Notes   Colonoscopy --  --    Gallbladder 97 --    Hysterectomy 1983 --    Tonsillectomy --  --          Medication List  Name Date Started Instructions   Advair -21 mcg/actuation inhalation HFA aerosol inhaler  inhale 2 puffs by inhalation route 2 times per day in the morning and evening   Allegra Allergy 180 mg oral tablet 03/16/2020 take 1 tablet by oral route daily for 90 days   aspirin 81 mg oral tablet,chewable 03/16/2020 chew 1 tablet (81 mg) by oral route once daily for 90 days   azelastine 0.15 % (205.5 mcg) nasal spray,non-aerosol 03/16/2020 spray 1 spray (205.5 mcg) in each nostril by intranasal route 2 times per day for 90 days   Caltrate 600 plus D 600 mg (1,500 mg)-800 unit oral tablet,chewable 03/16/2020 chew 2 tablets by oral route daily for 90 days   fluticasone propionate 50 mcg/actuation nasal spray,suspension 03/16/2020 inhale 1 spray (50 mcg) in each nostril by intranasal route once daily   hydrochlorothiazide 25 mg oral tablet 03/16/2020 take 1 tablet (25 mg) by oral route once daily for 90 days   meloxicam 7.5 mg oral tablet 03/16/2020 take 1 tablet (7.5 mg) by oral route once daily with food for arthritis pain   metformin 500 mg oral tablet extended release 24 hr 03/16/2020 take 1 tablet (500 mg) by oral route once daily with the evening meal for 90 days   potassium chloride 20 mEq oral tablet extended release 03/16/2020 take 1 tablet (20 meq) by oral route once daily with food for 90 days   pravastatin 10 mg oral tablet 03/16/2020 take 1  tablet by oral route once a day (at bedtime) for 90 days   Singulair 10 mg oral tablet 2020 take 1 tablet (10 mg) by oral route once daily in the evening for 90 days   Ventolin HFA 90 mcg/actuation inhalation HFA aerosol inhaler 2020 inhale 1 - 2 puffs (90 - 180 mcg) by inhalation route every 4-6 hours as needed   Vitamin C 500 mg oral tablet  take 1 tablet by oral route 2 times a day   Vitamin D2 1,250 mcg (50,000 unit) oral capsule 2020 take 1 capsule (50,000 unit) by oral route once weekly for 120 days         Allergy List  Allergen Name Date Reaction Notes   NO KNOWN DRUG ALLERGIES --  --  --          Family Medical History  Disease Name Relative/Age Notes   Heart Disease Brother/  Father/   Father; Brother  father MI massive age 59; brother age 50   Cancer, Unspecified Mother/   Mother   Diabetes, unspecified type Brother/  Mother/   Mother; Brother   Diabetes mellitus, type II Brother/  Mother/   --    Kidney Cancer Mother/   --    Family history of Arthritis Father/   Father         Reproductive History  Menstrual   Last Menstrual Period: 1983 Menopause Status: Postmenopausal Age Menopause: 38   Pregnancy Summary   Total Pregnancies: 0 Full Term: 0 Premature: 0   Ab Induced: 0 Ab Spontaneous: 0 Ectopics: 0   Multiples: 0 Livin         Social History  Finding Status Start/Stop Quantity Notes   Alcohol Never --/-- --  --    Alcohol Use Never --/-- --  does not drink   Homemaker. --  --/-- --  --    lives with spouse --  --/-- --  --    . --  --/-- --  --    Recreational Drug Use Never --/-- --  no   Retired. --  --/-- --  --    Tobacco Never --/-- --  never smoker         Immunizations  NameDate Admin Mfg Trade Name Lot Number Route Inj VIS Given VIS Publication   Qblwttdxs97/2019 [facility] Fluzone Quadrivalent  NE NE 2020    Comments: pt states she received at [facility] Prescription 10/19   Eukgcfiao15/2017 [facility] Fluarix, quadrivalent, preservative free [id number] IM LD  "10/11/2017 08/07/2015   Comments: pt tolerated well,kmh   Ewxxglsryxud71/21/2015 [facility] Unknown TradeName [id number] IM RD 10/21/2015 02/27/2013   Comments: tolerated well   Mqywzdyycnjy74/21/2015 UNK Unknown TradeName [id number] IM RD 10/21/2015 02/27/2013   Comments: tolerated well         Review of Systems  · Constitutional  o Denies  o : fever, fatigue, weight loss, weight gain  · Eyes  o Denies  o : double vision, impaired vision, blurred vision  · HENT  o Denies  o : headaches, vertigo, lightheadedness  · Cardiovascular  o Denies  o : lower extremity edema, claudication, chest pressure, palpitations  · Respiratory  o Denies  o : shortness of breath, wheezing, cough, hemoptysis, dyspnea on exertion  · Gastrointestinal  o Admits  o : constipation  o Denies  o : nausea, vomiting, diarrhea, abdominal pain  · Integument  o Denies  o : rash, itching, nail changes  · Musculoskeletal  o Denies  o : joint pain, joint swelling, muscle pain  · Psychiatric  o Denies  o : anxiety, depression      Vitals  Date Time BP Position Site L\R Cuff Size HR RR TEMP (F) WT  HT  BMI kg/m2 BSA m2 O2 Sat HC       06/01/2020 01:04 /86 Sitting    87 - R  97.3 234lbs 2oz 5'  7\" 36.67 2.24 95 %          Physical Examination  · Constitutional  o Appearance  o : well-nourished, well developed, in no acute distress  · Eyes  o Conjunctivae  o : conjunctivae normal, no exudates present  o Sclerae  o : sclerae white  o Pupils and Irises  o : pupils equal and round, and reactive to light and accomodation bilaterally  o Eyelids/Ocular Adnexae  o : extra ocular movements intact  · Respiratory  o Respiratory Effort  o : breathing unlabored, no accessory muscle use  o Inspection of Chest  o : normal appearance, no retractions  o Auscultation of Lungs  o : normal breath sounds bilaterally  · Cardiovascular  o Heart  o :   § Auscultation of Heart  § : regular rate and rhythm, no murmurs, gallops or rubs  o Peripheral Vascular System  o :   § Extremities  § : " no edema  · Neurologic  o Mental Status Examination  o :   § Orientation  § : alert and oriented x3  § Speech/Language  § : normal speech pattern  o Gait and Station  o : normal gait, able to stand without difficulty  · Psychiatric  o Judgement and Insight  o : judgment and insight intact, judgement for everyday activities and social situations within normal limits, insight intact  o Thought Processes  o : rate of thoughts normal, thought content logical  o Mood and Affect  o : mood normal, affect appropriate              Assessment  · Diabetes mellitus, type 2     250.00/E11.9  · Essential hypertension     401.9/I10  · Hyperlipidemia     272.4/E78.5  · Vitamin D deficiency     268.9/E55.9  · Constipation     564.00/K59.00  Will send over MiraLAX. Discussed increased water and fiber in diet. Patient verbalized understanding scribble treatment plan    Problems Reconciled  Plan  · Orders  o Diabetes 2 Panel (Urine Microalbumin, CMP, Lipid, A1c, ) [facility] (89988, 03122, 33331, 29584) - 250.00/E11.9 - 06/01/2020  o CBC with Auto Diff [facility] (11624) - 250.00/E11.9 - 06/01/2020  o Thyroid Profile (00284, 48172, THYII) - 250.00/E11.9 - 06/01/2020  o Vitamin D (25-Hydroxy) Level (69299) - 268.9/E55.9, 250.00/E11.9, 401.9/I10 - 06/01/2020  o ACO-39: Current medications updated and reviewed () - - 06/01/2020  · Medications  o Miralax 17 gram/dose oral powder   SIG: take 17 gram mixed with 8 oz. water, juice, soda, coffee or tea by oral route once daily for 90 days   DISP: (1) 765 gm bottle with 1 refills  Prescribed on 06/01/2020     o Allegra Allergy 180 mg oral tablet   SIG: take 1 tablet by oral route daily for 90 days   DISP: (90) tablets with 1 refills  Adjusted on 06/01/2020     o aspirin 81 mg oral tablet,chewable   SIG: chew 1 tablet (81 mg) by oral route once daily for 90 days   DISP: (90) tablets with 1 refills  Adjusted on 06/01/2020     o azelastine 0.15 % (205.5 mcg) nasal spray,non-aerosol   SIG: spray 1 spray  (205.5 mcg) in each nostril by intranasal route 2 times per day for 90 days   DISP: (3) 30 ml squeez btl with 1 refills  Adjusted on 06/01/2020     o fluticasone propionate 50 mcg/actuation nasal spray,suspension   SIG: inhale 1 spray (50 mcg) in each nostril by intranasal route once daily   DISP: (3) 9.9 ml aer w/adap with 1 refills  Adjusted on 06/01/2020     o hydrochlorothiazide 25 mg oral tablet   SIG: take 1 tablet (25 mg) by oral route once daily for 90 days   DISP: (90) tablets with 1 refills  Adjusted on 06/01/2020     o meloxicam 7.5 mg oral tablet   SIG: take 1 tablet (7.5 mg) by oral route once daily with food for arthritis pain   DISP: (90) tablets with 1 refills  Adjusted on 06/01/2020     o metformin 500 mg oral tablet extended release 24 hr   SIG: take 1 tablet (500 mg) by oral route once daily with the evening meal for 90 days   DISP: (90) tablets with 1 refills  Adjusted on 06/01/2020     o potassium chloride 20 mEq oral tablet extended release   SIG: take 1 tablet (20 meq) by oral route once daily with food for 90 days   DISP: (90) tablets with 1 refills  Adjusted on 06/01/2020     o pravastatin 10 mg oral tablet   SIG: take 1 tablet by oral route once a day (at bedtime) for 90 days   DISP: (90) tablets with 1 refills  Adjusted on 06/01/2020     o Singulair 10 mg oral tablet   SIG: take 1 tablet (10 mg) by oral route once daily in the evening for 90 days   DISP: (90) tablets with 1 refills  Adjusted on 06/01/2020     o Ventolin HFA 90 mcg/actuation inhalation HFA aerosol inhaler   SIG: inhale 1 - 2 puffs (90 - 180 mcg) by inhalation route every 4-6 hours as needed for 30 days   DISP: (1) 8 gm canister with 2 refills  Adjusted on 06/01/2020     o Vitamin D2 1,250 mcg (50,000 unit) oral capsule   SIG: take 1 capsule by oral route once a week for 120 days   DISP: (18) capsules with 0 refills  Adjusted on 06/01/2020     o Medications have been Reconciled  o Transition of Care or Provider  Policy  · Instructions  o Continue blood sugar monitoring daily and record. Bring your log to office visits. Call the office for readings below 70 and above 250 or any complications.  o Daily foot care. Avoid walking barefoot. Annual Dilated Eye Exam.  o Discussed with patient blood pressure monitoring, hemoglobin A1C levels need to be below 7.0, and LDL (Lipid) goals below 70.  o Patient advised to monitor blood pressure (B/P) at home and journal readings. Patient informed that a B/P reading at home of more than 130/80 is considered hypertension. For readings greater [id number]/90 or higher patient is advised to follow up in the office with readings for management. Patient advised to limit sodium intake.  o Advised that cheeses and other sources of dairy fats, animal fats, fast food, and the extras (candy, pastries, pies, doughnuts and cookies) all contain LDL raising nutrients. Advised to increase fruits, vegetables, whole grains, and to monitor portion sizes.   o Take all medications as prescribed/directed.  o Patient was educated/instructed on their diagnosis, treatment and medications prior to discharge from the clinic today.  o Call the office with any concerns or questions.  · Disposition  o Call or Return if symptoms worsen or persist.  o follow up in 6 months  o follow up as needed  o call the office with any questions or concerns            Electronically Signed by: Ramiro Mccloud APRN -Author on June 1, 2020 01:41:48 PM   DISPLAY PLAN FREE TEXT

## 2021-11-11 ENCOUNTER — OFFICE VISIT (OUTPATIENT)
Dept: FAMILY MEDICINE CLINIC | Facility: CLINIC | Age: 76
End: 2021-11-11

## 2021-11-11 VITALS
SYSTOLIC BLOOD PRESSURE: 132 MMHG | HEIGHT: 67 IN | HEART RATE: 78 BPM | WEIGHT: 210.8 LBS | DIASTOLIC BLOOD PRESSURE: 80 MMHG | TEMPERATURE: 97.2 F | BODY MASS INDEX: 33.09 KG/M2 | OXYGEN SATURATION: 98 %

## 2021-11-11 DIAGNOSIS — J01.10 ACUTE NON-RECURRENT FRONTAL SINUSITIS: Primary | ICD-10-CM

## 2021-11-11 DIAGNOSIS — H69.83 ETD (EUSTACHIAN TUBE DYSFUNCTION), BILATERAL: ICD-10-CM

## 2021-11-11 PROCEDURE — 99214 OFFICE O/P EST MOD 30 MIN: CPT | Performed by: NURSE PRACTITIONER

## 2021-11-11 RX ORDER — DOXYCYCLINE HYCLATE 100 MG/1
100 CAPSULE ORAL 2 TIMES DAILY
Qty: 20 CAPSULE | Refills: 0 | Status: SHIPPED | OUTPATIENT
Start: 2021-11-11 | End: 2021-12-20

## 2021-11-11 RX ORDER — BROMPHENIRAMINE MALEATE, PSEUDOEPHEDRINE HYDROCHLORIDE, AND DEXTROMETHORPHAN HYDROBROMIDE 2; 30; 10 MG/5ML; MG/5ML; MG/5ML
5 SYRUP ORAL 4 TIMES DAILY PRN
Qty: 473 ML | Refills: 1 | Status: SHIPPED | OUTPATIENT
Start: 2021-11-11 | End: 2021-12-20

## 2021-11-11 NOTE — PROGRESS NOTES
Chief Complaint  Nasal Congestion, Tinnitus, Headache, and Dental Pain    Subjective        Social History     Socioeconomic History   • Marital status:    Tobacco Use   • Smoking status: Never Smoker   • Smokeless tobacco: Never Used   Vaping Use   • Vaping Use: Never used   Substance and Sexual Activity   • Alcohol use: Not Currently   • Drug use: Never     Past Medical History:   Diagnosis Date   • Allergic rhinitis 10/30/2014   • Arthritis    • Asthma     STOPPED IN 2003   • Bronchitis    • Chronic pain of right knee 01/29/2015   • Deafness    • Gall stones 1997   • High blood pressure    • High cholesterol    • Hyperlipemia    • Hyperlipidemia 02/08/2018   • Hypertension    • Impaired fasting glucose 04/28/2014   • Osteopenia 11/2015    L1 LUMBAR SPINE   • Pneumonia    • Pressure ulcer, stage 1    • Reflux esophagitis    • Seasonal allergies    • Sinus trouble    • Type 2 diabetes mellitus without complication (HCC)      Past Surgical History:   Procedure Laterality Date   • COLONOSCOPY     • GALLBLADDER SURGERY  1997   • HYSTERECTOMY  1983   • TONSILLECTOMY         Gaby Reyes presents to [facility]  Tinnitus  This is a new problem. The current episode started 1 to 4 weeks ago. The problem occurs intermittently. The problem has been waxing and waning. Associated symptoms include congestion and headaches. Pertinent negatives include no chest pain or coughing. Exacerbated by: nasal congestion. Treatments tried: flonase, allergy medication. The treatment provided mild relief.   Headache   This is a recurrent problem. The current episode started 1 to 4 weeks ago. The problem occurs seasonly. The problem has been waxing and waning. The pain is located in the bilateral and frontal region. The pain does not radiate. The pain quality is similar to prior headaches. The quality of the pain is described as aching. Associated symptoms include ear pain, rhinorrhea, sinus pressure  "and tinnitus. Pertinent negatives include no coughing, phonophobia or photophobia. The symptoms are aggravated by weather changes. Treatments tried: flonase and allergy medication. Her past medical history is significant for sinus disease.   Asthma  There is no cough or hoarse voice. Primary symptoms comments: Mild Exertional dyspnea. This is a new problem. The current episode started in the past 7 days. Associated symptoms include dyspnea on exertion, ear pain, headaches, nasal congestion, postnasal drip, rhinorrhea and sneezing. Pertinent negatives include no appetite change, chest pain, orthopnea or trouble swallowing. Her symptoms are aggravated by change in weather and pollen. Her symptoms are alleviated by steroid inhaler. She reports moderate improvement on treatment. Her past medical history is significant for asthma.   Sinusitis  This is a recurrent problem. The current episode started more than 1 month ago. The problem has been waxing and waning since onset. There has been no fever. Associated symptoms include congestion, ear pain, headaches, sinus pressure and sneezing. Pertinent negatives include no coughing or hoarse voice. (Scratchy throat) Past treatments include nothing. The treatment provided no relief.       Objective   Vital Signs:   /80   Pulse 78   Temp 97.2 °F (36.2 °C)   Ht 170.2 cm (67\")   Wt 95.6 kg (210 lb 12.8 oz)   SpO2 98%   BMI 33.02 kg/m²     Physical Exam  Vitals reviewed.   Constitutional:       Appearance: Normal appearance. She is well-developed.   HENT:      Head: Normocephalic and atraumatic.      Right Ear: Tympanic membrane and external ear normal. No tenderness. No middle ear effusion.      Left Ear: Tympanic membrane and external ear normal. No tenderness.  No middle ear effusion.      Mouth/Throat:      Mouth: Mucous membranes are moist.      Pharynx: No oropharyngeal exudate or posterior oropharyngeal erythema.      Comments: Clear postnasal drip  Eyes:      " Conjunctiva/sclera: Conjunctivae normal.      Pupils: Pupils are equal, round, and reactive to light.   Cardiovascular:      Rate and Rhythm: Normal rate and regular rhythm.      Heart sounds: No murmur heard.      Pulmonary:      Effort: Pulmonary effort is normal.      Breath sounds: Normal breath sounds. No wheezing or rhonchi.   Lymphadenopathy:      Cervical: No cervical adenopathy.   Skin:     General: Skin is warm and dry.   Neurological:      Mental Status: She is alert and oriented to person, place, and time.      Cranial Nerves: No cranial nerve deficit.   Psychiatric:         Mood and Affect: Mood and affect normal.         Behavior: Behavior normal.         Thought Content: Thought content normal.         Judgment: Judgment normal.        Result Review :   The following data was reviewed by: CALISTA Mejia on 11/11/2021:  Common labs    Common Labsle 12/9/20 12/9/20 6/11/21 6/11/21 6/11/21 6/11/21 6/11/21    2107 2107 1614 1614 1614 1614 1614   Glucose  92    89    BUN  12    20    Creatinine  0.26 (A)    0.79    eGFR Non African Am      71    Sodium  138    138    Potassium  3.4 (A)    3.9    Chloride  100    100    Calcium  9.3    9.4    Albumin  4.2    4.20    Total Bilirubin  0.53    0.4    Alkaline Phosphatase  86    72    AST (SGOT)  27    17    ALT (SGPT)  18    10    WBC 10.11   9.68      Hemoglobin 14.9   15.0      Hematocrit 44.7   44.3      Platelets 385   369      Total Cholesterol     156     Total Cholesterol  146        Triglycerides  126   166 (A)     HDL Cholesterol  42   38 (A)     LDL Cholesterol   79   89     Hemoglobin A1C  5.6 5.61 (A)       Microalbumin, Urine       <1.2   (A) Abnormal value       Comments are available for some flowsheets but are not being displayed.                     Assessment and Plan    Diagnoses and all orders for this visit:    1. Acute non-recurrent frontal sinusitis (Primary)  Comments:  We will treat with doxycycline and Bromfed.  Discussed  to continue on Allegra, Flonase, and prescribed inhalers.    2. ETD (Eustachian tube dysfunction), bilateral  Comments:  Continue on Flonase we will start on doxycycline discussed return precautions patient verbalized understanding is agreeable treatment plan    Other orders  -     brompheniramine-pseudoephedrine-DM 30-2-10 MG/5ML syrup; Take 5 mL by mouth 4 (Four) Times a Day As Needed for Congestion or Allergies.  Dispense: 473 mL; Refill: 1  -     doxycycline (VIBRAMYCIN) 100 MG capsule; Take 1 capsule by mouth 2 (Two) Times a Day.  Dispense: 20 capsule; Refill: 0        Follow Up   No follow-ups on file.  Patient was given instructions and counseling regarding her condition or for health maintenance advice. Please see specific information pulled into the AVS if appropriate.

## 2021-12-03 ENCOUNTER — TELEPHONE (OUTPATIENT)
Dept: FAMILY MEDICINE CLINIC | Facility: CLINIC | Age: 76
End: 2021-12-03

## 2021-12-20 ENCOUNTER — OFFICE VISIT (OUTPATIENT)
Dept: FAMILY MEDICINE CLINIC | Facility: CLINIC | Age: 76
End: 2021-12-20

## 2021-12-20 VITALS
BODY MASS INDEX: 33.09 KG/M2 | HEIGHT: 67 IN | TEMPERATURE: 96.4 F | WEIGHT: 210.8 LBS | OXYGEN SATURATION: 98 % | SYSTOLIC BLOOD PRESSURE: 136 MMHG | HEART RATE: 84 BPM | DIASTOLIC BLOOD PRESSURE: 78 MMHG

## 2021-12-20 DIAGNOSIS — I10 ESSENTIAL HYPERTENSION: ICD-10-CM

## 2021-12-20 DIAGNOSIS — E78.2 MIXED HYPERLIPIDEMIA: ICD-10-CM

## 2021-12-20 DIAGNOSIS — Z78.0 POST-MENOPAUSAL: ICD-10-CM

## 2021-12-20 DIAGNOSIS — Z09 FOLLOW-UP EXAM, 3-6 MONTHS SINCE PREVIOUS EXAM: Primary | ICD-10-CM

## 2021-12-20 DIAGNOSIS — J30.1 SEASONAL ALLERGIC RHINITIS DUE TO POLLEN: ICD-10-CM

## 2021-12-20 DIAGNOSIS — E11.9 TYPE 2 DIABETES MELLITUS WITHOUT COMPLICATION, WITHOUT LONG-TERM CURRENT USE OF INSULIN (HCC): ICD-10-CM

## 2021-12-20 DIAGNOSIS — Z12.31 ENCOUNTER FOR SCREENING MAMMOGRAM FOR MALIGNANT NEOPLASM OF BREAST: ICD-10-CM

## 2021-12-20 DIAGNOSIS — R53.83 FATIGUE, UNSPECIFIED TYPE: ICD-10-CM

## 2021-12-20 DIAGNOSIS — E55.9 VITAMIN D DEFICIENCY: ICD-10-CM

## 2021-12-20 LAB
25(OH)D3 SERPL-MCNC: 52 NG/ML
ALBUMIN SERPL-MCNC: 4 G/DL (ref 3.5–5.2)
ALBUMIN UR-MCNC: <1.2 MG/DL
ALBUMIN/GLOB SERPL: 1.3 G/DL
ALP SERPL-CCNC: 75 U/L (ref 39–117)
ALT SERPL W P-5'-P-CCNC: 11 U/L (ref 1–33)
ANION GAP SERPL CALCULATED.3IONS-SCNC: 9.2 MMOL/L (ref 5–15)
AST SERPL-CCNC: 10 U/L (ref 1–32)
BASOPHILS # BLD AUTO: 0.06 10*3/MM3 (ref 0–0.2)
BASOPHILS NFR BLD AUTO: 0.8 % (ref 0–1.5)
BILIRUB SERPL-MCNC: 0.4 MG/DL (ref 0–1.2)
BUN SERPL-MCNC: 22 MG/DL (ref 8–23)
BUN/CREAT SERPL: 36.1 (ref 7–25)
CALCIUM SPEC-SCNC: 9.6 MG/DL (ref 8.6–10.5)
CHLORIDE SERPL-SCNC: 102 MMOL/L (ref 98–107)
CHOLEST SERPL-MCNC: 146 MG/DL (ref 0–200)
CO2 SERPL-SCNC: 26.8 MMOL/L (ref 22–29)
CREAT SERPL-MCNC: 0.61 MG/DL (ref 0.57–1)
DEPRECATED RDW RBC AUTO: 38.1 FL (ref 37–54)
EOSINOPHIL # BLD AUTO: 0.3 10*3/MM3 (ref 0–0.4)
EOSINOPHIL NFR BLD AUTO: 3.9 % (ref 0.3–6.2)
ERYTHROCYTE [DISTWIDTH] IN BLOOD BY AUTOMATED COUNT: 12.7 % (ref 12.3–15.4)
FERRITIN SERPL-MCNC: 56.7 NG/ML (ref 13–150)
FOLATE SERPL-MCNC: 7.89 NG/ML (ref 4.78–24.2)
GFR SERPL CREATININE-BSD FRML MDRD: 96 ML/MIN/1.73
GLOBULIN UR ELPH-MCNC: 3.1 GM/DL
GLUCOSE SERPL-MCNC: 105 MG/DL (ref 65–99)
HBA1C MFR BLD: 5.86 % (ref 4.8–5.6)
HCT VFR BLD AUTO: 42.7 % (ref 34–46.6)
HDLC SERPL-MCNC: 42 MG/DL (ref 40–60)
HGB BLD-MCNC: 14.7 G/DL (ref 12–15.9)
IMM GRANULOCYTES # BLD AUTO: 0.02 10*3/MM3 (ref 0–0.05)
IMM GRANULOCYTES NFR BLD AUTO: 0.3 % (ref 0–0.5)
IRON 24H UR-MRATE: 55 MCG/DL (ref 37–145)
IRON SATN MFR SERPL: 13 % (ref 20–50)
LDLC SERPL CALC-MCNC: 76 MG/DL (ref 0–100)
LDLC/HDLC SERPL: 1.69 {RATIO}
LYMPHOCYTES # BLD AUTO: 2.71 10*3/MM3 (ref 0.7–3.1)
LYMPHOCYTES NFR BLD AUTO: 35.5 % (ref 19.6–45.3)
MCH RBC QN AUTO: 28.4 PG (ref 26.6–33)
MCHC RBC AUTO-ENTMCNC: 34.4 G/DL (ref 31.5–35.7)
MCV RBC AUTO: 82.6 FL (ref 79–97)
MONOCYTES # BLD AUTO: 0.53 10*3/MM3 (ref 0.1–0.9)
MONOCYTES NFR BLD AUTO: 6.9 % (ref 5–12)
NEUTROPHILS NFR BLD AUTO: 4.02 10*3/MM3 (ref 1.7–7)
NEUTROPHILS NFR BLD AUTO: 52.6 % (ref 42.7–76)
NRBC BLD AUTO-RTO: 0 /100 WBC (ref 0–0.2)
PLATELET # BLD AUTO: 342 10*3/MM3 (ref 140–450)
PMV BLD AUTO: 11.2 FL (ref 6–12)
POTASSIUM SERPL-SCNC: 3.5 MMOL/L (ref 3.5–5.2)
PROT SERPL-MCNC: 7.1 G/DL (ref 6–8.5)
RBC # BLD AUTO: 5.17 10*6/MM3 (ref 3.77–5.28)
SODIUM SERPL-SCNC: 138 MMOL/L (ref 136–145)
TIBC SERPL-MCNC: 420 MCG/DL (ref 298–536)
TRANSFERRIN SERPL-MCNC: 282 MG/DL (ref 200–360)
TRIGL SERPL-MCNC: 165 MG/DL (ref 0–150)
TSH SERPL DL<=0.05 MIU/L-ACNC: 2.16 UIU/ML (ref 0.27–4.2)
VIT B12 BLD-MCNC: 342 PG/ML (ref 211–946)
VLDLC SERPL-MCNC: 28 MG/DL (ref 5–40)
WBC NRBC COR # BLD: 7.64 10*3/MM3 (ref 3.4–10.8)

## 2021-12-20 PROCEDURE — 80053 COMPREHEN METABOLIC PANEL: CPT | Performed by: NURSE PRACTITIONER

## 2021-12-20 PROCEDURE — 82607 VITAMIN B-12: CPT | Performed by: NURSE PRACTITIONER

## 2021-12-20 PROCEDURE — 82306 VITAMIN D 25 HYDROXY: CPT | Performed by: NURSE PRACTITIONER

## 2021-12-20 PROCEDURE — 83540 ASSAY OF IRON: CPT | Performed by: NURSE PRACTITIONER

## 2021-12-20 PROCEDURE — 82728 ASSAY OF FERRITIN: CPT | Performed by: NURSE PRACTITIONER

## 2021-12-20 PROCEDURE — 84443 ASSAY THYROID STIM HORMONE: CPT | Performed by: NURSE PRACTITIONER

## 2021-12-20 PROCEDURE — 80061 LIPID PANEL: CPT | Performed by: NURSE PRACTITIONER

## 2021-12-20 PROCEDURE — 83036 HEMOGLOBIN GLYCOSYLATED A1C: CPT | Performed by: NURSE PRACTITIONER

## 2021-12-20 PROCEDURE — 99214 OFFICE O/P EST MOD 30 MIN: CPT | Performed by: NURSE PRACTITIONER

## 2021-12-20 PROCEDURE — 85025 COMPLETE CBC W/AUTO DIFF WBC: CPT | Performed by: NURSE PRACTITIONER

## 2021-12-20 PROCEDURE — 82043 UR ALBUMIN QUANTITATIVE: CPT | Performed by: NURSE PRACTITIONER

## 2021-12-20 PROCEDURE — 82746 ASSAY OF FOLIC ACID SERUM: CPT | Performed by: NURSE PRACTITIONER

## 2021-12-20 PROCEDURE — 84466 ASSAY OF TRANSFERRIN: CPT | Performed by: NURSE PRACTITIONER

## 2021-12-20 RX ORDER — CYCLOBENZAPRINE HCL 10 MG
10 TABLET ORAL 3 TIMES DAILY
Qty: 45 TABLET | Refills: 1 | Status: SHIPPED | OUTPATIENT
Start: 2021-12-20 | End: 2022-04-05

## 2021-12-20 RX ORDER — MELOXICAM 7.5 MG/1
7.5 TABLET ORAL DAILY
Qty: 90 TABLET | Refills: 1 | Status: SHIPPED | OUTPATIENT
Start: 2021-12-20 | End: 2022-06-20 | Stop reason: SDUPTHER

## 2021-12-20 RX ORDER — FEXOFENADINE HCL 180 MG/1
180 TABLET ORAL DAILY
Qty: 90 TABLET | Refills: 1 | Status: SHIPPED | OUTPATIENT
Start: 2021-12-20 | End: 2022-06-20 | Stop reason: SDUPTHER

## 2021-12-20 RX ORDER — HYDROCHLOROTHIAZIDE 25 MG/1
25 TABLET ORAL DAILY
Qty: 90 TABLET | Refills: 1 | Status: SHIPPED | OUTPATIENT
Start: 2021-12-20 | End: 2022-06-20 | Stop reason: SDUPTHER

## 2021-12-20 RX ORDER — PRAVASTATIN SODIUM 10 MG
10 TABLET ORAL DAILY
Qty: 90 TABLET | Refills: 1 | Status: SHIPPED | OUTPATIENT
Start: 2021-12-20 | End: 2022-06-20 | Stop reason: SDUPTHER

## 2021-12-20 RX ORDER — FLUTICASONE PROPIONATE 50 MCG
1 SPRAY, SUSPENSION (ML) NASAL DAILY
Qty: 1 ML | Refills: 5 | Status: SHIPPED | OUTPATIENT
Start: 2021-12-20 | End: 2022-06-20 | Stop reason: SDUPTHER

## 2021-12-20 RX ORDER — ALBUTEROL SULFATE 90 UG/1
2 AEROSOL, METERED RESPIRATORY (INHALATION) EVERY 6 HOURS
Qty: 8.6 G | Refills: 1 | Status: SHIPPED | OUTPATIENT
Start: 2021-12-20 | End: 2022-06-20 | Stop reason: SDUPTHER

## 2021-12-20 RX ORDER — ERGOCALCIFEROL 1.25 MG/1
50000 CAPSULE ORAL
Qty: 13 CAPSULE | Refills: 1 | Status: SHIPPED | OUTPATIENT
Start: 2021-12-20 | End: 2022-06-20 | Stop reason: SDUPTHER

## 2021-12-20 RX ORDER — ERGOCALCIFEROL 1.25 MG/1
CAPSULE ORAL
COMMUNITY
Start: 2021-09-17 | End: 2021-12-20 | Stop reason: SDUPTHER

## 2021-12-20 RX ORDER — MONTELUKAST SODIUM 10 MG/1
10 TABLET ORAL DAILY
Qty: 90 TABLET | Refills: 1 | Status: SHIPPED | OUTPATIENT
Start: 2021-12-20 | End: 2022-06-20 | Stop reason: SINTOL

## 2021-12-20 RX ORDER — FLUTICASONE PROPIONATE AND SALMETEROL XINAFOATE 230; 21 UG/1; UG/1
2 AEROSOL, METERED RESPIRATORY (INHALATION) 2 TIMES DAILY
Qty: 8 G | Refills: 1 | Status: SHIPPED | OUTPATIENT
Start: 2021-12-20 | End: 2022-06-20 | Stop reason: SDUPTHER

## 2021-12-20 RX ORDER — POTASSIUM CHLORIDE 20 MEQ/1
20 TABLET, EXTENDED RELEASE ORAL DAILY
Qty: 90 TABLET | Refills: 1 | Status: SHIPPED | OUTPATIENT
Start: 2021-12-20 | End: 2022-06-20 | Stop reason: SDUPTHER

## 2021-12-20 RX ORDER — ASPIRIN 81 MG/1
81 TABLET, CHEWABLE ORAL DAILY
Qty: 90 TABLET | Refills: 1 | Status: SHIPPED | OUTPATIENT
Start: 2021-12-20 | End: 2022-06-20 | Stop reason: SDUPTHER

## 2021-12-20 NOTE — PROGRESS NOTES
"Chief Complaint  Annual Exam and Med Refill    Subjective          Medical History: has a past medical history of Allergic rhinitis (10/30/2014), Arthritis, Asthma, Bronchitis, Chronic pain of right knee (01/29/2015), Deafness, Gall stones (1997), High blood pressure, High cholesterol, Hyperlipemia, Hyperlipidemia (02/08/2018), Hypertension, Impaired fasting glucose (04/28/2014), Osteopenia (11/2015), Pneumonia, Pressure ulcer, stage 1, Reflux esophagitis, Seasonal allergies, Sinus trouble, and Type 2 diabetes mellitus without complication (HCC).     Surgical History: has a past surgical history that includes Colonoscopy; Gallbladder surgery (1997); Hysterectomy (1983); and Tonsillectomy.     Family History: family history includes Arthritis in her father; Cancer in her mother; Diabetes type II in her brother and mother; Heart disease (age of onset: 50) in her brother; Heart disease (age of onset: 59) in her father; Kidney cancer in her mother.     Social History: reports that she has never smoked. She has never used smokeless tobacco. She reports previous alcohol use. She reports that she does not use drugs.    Gaby Reyes presents to [facility]    ENCOUNTER DATE:  12/20/2021    Gaby Reyes / 76 y.o. / female      CHIEF COMPLAINT    Annual Exam and Med Refill      VITALS    /78   Pulse 84   Temp 96.4 °F (35.8 °C)   Ht 170.2 cm (67\")   Wt 95.6 kg (210 lb 12.8 oz)   SpO2 98%   BMI 33.02 kg/m²     BP Readings from Last 3 Encounters:  12/20/21 : 136/78  11/11/21 : 132/80  08/23/21 : 134/80    Wt Readings from Last 3 Encounters:  12/20/21 : 95.6 kg (210 lb 12.8 oz)  11/11/21 : 95.6 kg (210 lb 12.8 oz)  08/23/21 : 96 kg (211 lb 9.6 oz)    Body mass index is 33.02 kg/m².    MEDICATIONS    Current Outpatient Medications on File Prior to Visit:  albuterol sulfate  (90 Base) MCG/ACT inhaler, Inhale 2 puffs Every 6 (Six) Hours., Disp: 8.6 g, Rfl: 1  aspirin 81 MG chewable " tablet, Chew 1 tablet Daily., Disp: 90 tablet, Rfl: 1  brompheniramine-pseudoephedrine-DM 30-2-10 MG/5ML syrup, Take 5 mL by mouth 4 (Four) Times a Day As Needed for Congestion or Allergies., Disp: 473 mL, Rfl: 1  calcium carbonate-vitamin d 600-400 MG-UNIT per tablet, Take 1 tablet by mouth 2 (two) times a day., Disp: 180 tablet, Rfl: 1  cyclobenzaprine (FLEXERIL) 10 MG tablet, Take 1 tablet by mouth 3 (Three) Times a Day., Disp: 45 tablet, Rfl: 1  doxycycline (VIBRAMYCIN) 100 MG capsule, Take 1 capsule by mouth 2 (Two) Times a Day., Disp: 20 capsule, Rfl: 0  fexofenadine (ALLEGRA) 180 MG tablet, Take 1 tablet by mouth Daily., Disp: 90 tablet, Rfl: 1  fluticasone (FLONASE) 50 MCG/ACT nasal spray, 1 spray into the nostril(s) as directed by provider Daily., Disp: 1 mL, Rfl: 5  meloxicam (MOBIC) 7.5 MG tablet, Take 1 tablet by mouth Daily., Disp: 90 tablet, Rfl: 1  metFORMIN (GLUCOPHAGE) 500 MG tablet, Take 1 tablet by mouth Daily., Disp: 90 tablet, Rfl: 1  montelukast (SINGULAIR) 10 MG tablet, Take 1 tablet by mouth Daily., Disp: 90 tablet, Rfl: 1  potassium chloride (K-DUR,KLOR-CON) 20 MEQ CR tablet, Take 1 tablet by mouth Daily., Disp: 90 tablet, Rfl: 1  pravastatin (PRAVACHOL) 10 MG tablet, Take 1 tablet by mouth Daily., Disp: 90 tablet, Rfl: 1  vitamin D (ERGOCALCIFEROL) 1.25 MG (63249 UT) capsule capsule, , Disp: , Rfl:   fluticasone-salmeterol (Advair HFA) 230-21 MCG/ACT inhaler, Inhale 2 puffs 2 (two) times a day for 90 days., Disp: 12 g, Rfl: 2  hydroCHLOROthiazide (HYDRODIURIL) 25 MG tablet, Take 1 tablet by mouth Daily for 90 days., Disp: 90 tablet, Rfl: 1    No current facility-administered medications on file prior to visit.        HISTORY OF PRESENT ILLNESS    Gaby presents for annual health maintenance visit.    Last health maintenance visit: approximately 1 year ago  General health: good  Lifestyle:  Attempting to lose weight?: Yes   Diet: eats moderately healthy  Exercise: walks regularly  Tobacco:  Never used   Alcohol: does not drink  Work: Retired  Reproductive health:  Sexually active?: No   [id number]}  Kristi/Postmenopausal?: Yes   Domestic abuse concerns: No   Depression Screening:     PHQ-2: 0 (Not depressed)  PHQ-9: 0 (Negative screening for depression)    Patient Care Team:  Ramiro Mccloud APRN as PCP - General (Nurse Practitioner)      ALLERGIES  No Known Allergies     PFSH:     The following portions of the patient's history were reviewed and updated as appropriate: Allergies / Current Medications / Past Medical History / Surgical History / Social History / Family History    PROBLEM LIST   Patient Active Problem List:    Hyperlipemia    Obesity      PAST MEDICAL HISTORY  Past Medical History:  10/30/2014: Allergic rhinitis  No date: Arthritis  No date: Asthma     Comment:  STOPPED IN 2003  No date: Bronchitis  01/29/2015: Chronic pain of right knee  No date: Deafness  1997: Gall stones  No date: High blood pressure  No date: High cholesterol  No date: Hyperlipemia  02/08/2018: Hyperlipidemia  No date: Hypertension  04/28/2014: Impaired fasting glucose  11/2015: Osteopenia     Comment:  L1 LUMBAR SPINE  No date: Pneumonia  No date: Pressure ulcer, stage 1  No date: Reflux esophagitis  No date: Seasonal allergies  No date: Sinus trouble  No date: Type 2 diabetes mellitus without complication (HCC)    SURGICAL HISTORY  Past Surgical History:  No date: COLONOSCOPY  1997: GALLBLADDER SURGERY  1983: HYSTERECTOMY  No date: TONSILLECTOMY    SOCIAL HISTORY  Social History   Socioeconomic History     Marital status:    Tobacco Use     Smoking status: Never Smoker     Smokeless tobacco: Never Used   Vaping Use     Vaping Use: Never used   Substance and Sexual Activity     Alcohol use: Not Currently     Drug use: Never      FAMILY HISTORY  Review of patient's family history indicates:  Problem: Cancer     Relation: Mother         Age of Onset: (Not Specified)  Problem: Diabetes type II     Relation:  Mother         Age of Onset: (Not Specified)  Problem: Kidney cancer     Relation: Mother         Age of Onset: (Not Specified)  Problem: Heart disease     Relation: Father         Age of Onset: 59         Comment: MI MASSIVE   Problem: Arthritis     Relation: Father         Age of Onset: (Not Specified)  Problem: Heart disease     Relation: Brother         Age of Onset: 50  Problem: Diabetes type II     Relation: Brother         Age of Onset: (Not Specified)      IMMUNIZATION HISTORY    Immunization History  Administered            Date(s) Administered   Flu Vaccine Quad PF 6-35MO                         10/01/2021     Influenza, Unspecified                         10/01/2020     Pneumococcal, Unspecified                         10/21/2015        REVIEW OF SYSTEMS    REVIEWED DATA     Labs:    Lab Results      Component                Value               Date                      NA                       138                 06/11/2021                K                        3.9                 06/11/2021                CALCIUM                  9.4                 06/11/2021                AST                      17                  06/11/2021                ALT                      10                  06/11/2021                BUN                      20                  06/11/2021                CREATININE               0.79                06/11/2021                CREATININE               0.26 (L)            12/09/2020                CREATININE               0.66                06/01/2020                EGFRIFNONA               71                  06/11/2021              Lab Results      Component                Value               Date                      HGBA1C                   5.61 (H)            06/11/2021                HGBA1C                   5.6                 12/09/2020                HGBA1C                   5.9 (H)             06/01/2020                TSH                      1.980                06/11/2021                FREET4                   1.3                 12/09/2020              Lab Results      Component                Value               Date                      LDL                      89                  06/11/2021                HDL                      38 (L)              06/11/2021                TRIG                     166 (H)             06/11/2021                CHOLHDLRATIO             3.5                 12/09/2020              Lab Results      Component                Value               Date                      [id number]                 39.2                06/11/2021               Lab Results      Component                Value               Date                      WBC                      9.68                06/11/2021                HGB                      15.0                06/11/2021                MCV                      86.0                06/11/2021                PLT                      369                 06/11/2021              No results found for: PROTEIN, GLUCOSEU, BLOODU, NITRITEU, LEUKOCYTESUR    No results found for: HEPCVIRUSABY        ASSESSMENT & PLAN    ANNUAL WELLNESS EXAM / PHYSICAL     HEALTHCARE MAINTENANCE ISSUES    Cancer Screening:  Colon: Initial/Next screening at age: 67  Repeat colon cancer screening: every 10 years  Breast: Recommended monthly self exams; annual professional exam  Mammogram: every 1 year  Skin: Monthly self skin examination, annual exam by health professional      Lifestyle Counseling:  Lifestyle Modifications: Continue good lifestyle choices/modifications  Safety Issues: Always wear seatbelt, Avoid texting while driving   Use sunscreen, regular skin examination  Recommended annual dental/vision examination.  Emotional/Stress/Sleep: Reviewed and  given when appropriate    Diabetes  She presents for her follow-up diabetic visit. She has type 2 diabetes mellitus. Her disease course has been stable. Pertinent negatives for  hypoglycemia include no headaches. Associated symptoms include fatigue. Pertinent negatives for diabetes include no polydipsia, no polyphagia, no polyuria and no visual change. There are no hypoglycemic complications. Symptoms are stable. There are no diabetic complications. Risk factors for coronary artery disease include diabetes mellitus, dyslipidemia, obesity, hypertension and post-menopausal. Current diabetic treatment includes oral agent (monotherapy). She is compliant with treatment most of the time. She is following a generally healthy diet. When asked about meal planning, she reported none. An ACE inhibitor/angiotensin II receptor blocker is being taken. She does not see a podiatrist.Eye exam is not current.   Hyperlipidemia  This is a chronic problem. The current episode started more than 1 year ago. The problem is controlled. Exacerbating diseases include diabetes and obesity. Pertinent negatives include no shortness of breath. Current antihyperlipidemic treatment includes statins. The current treatment provides significant improvement of lipids. There are no compliance problems.  Risk factors for coronary artery disease include diabetes mellitus, dyslipidemia, hypertension, obesity and post-menopausal.   Hypertension  This is a chronic problem. The current episode started more than 1 year ago. The problem is controlled. Pertinent negatives include no anxiety, headaches, palpitations, peripheral edema or shortness of breath. Risk factors for coronary artery disease include obesity, post-menopausal state, diabetes mellitus and dyslipidemia. Past treatments include direct vasodilators. Current antihypertension treatment includes diuretics. The current treatment provides significant improvement. There are no compliance problems.    Fatigue  This is a new problem. The current episode started 1 to 4 weeks ago. The problem occurs daily. The problem has been waxing and waning. Associated symptoms include  "fatigue. Pertinent negatives include no congestion, headaches, rash or visual change. Nothing aggravates the symptoms. She has tried nothing for the symptoms. The treatment provided no relief.       Objective   Vital Signs:   /78   Pulse 84   Temp 96.4 °F (35.8 °C)   Ht 170.2 cm (67\")   Wt 95.6 kg (210 lb 12.8 oz)   SpO2 98%   BMI 33.02 kg/m²     Physical Exam  Vitals reviewed.   Constitutional:       Appearance: Normal appearance. She is well-developed. She is obese.   HENT:      Head: Normocephalic and atraumatic.   Eyes:      Conjunctiva/sclera: Conjunctivae normal.      Pupils: Pupils are equal, round, and reactive to light.   Cardiovascular:      Rate and Rhythm: Normal rate and regular rhythm.      Heart sounds: No murmur heard.  No friction rub.   Pulmonary:      Effort: Pulmonary effort is normal.      Breath sounds: Normal breath sounds. No wheezing or rhonchi.   Skin:     General: Skin is warm and dry.   Neurological:      Mental Status: She is alert and oriented to person, place, and time.   Psychiatric:         Mood and Affect: Mood and affect normal.         Behavior: Behavior normal.         Thought Content: Thought content normal.         Judgment: Judgment normal.        Result Review :   The following data was reviewed by: CALISTA Mejia on 12/20/2021:  Common labs    Common Labsle 6/11/21 6/11/21 6/11/21 6/11/21 6/11/21    1614 1614 1614 1614 1614   Glucose    89    BUN    20    Creatinine    0.79    eGFR Non African Am    71    Sodium    138    Potassium    3.9    Chloride    100    Calcium    9.4    Albumin    4.20    Total Bilirubin    0.4    Alkaline Phosphatase    72    AST (SGOT)    17    ALT (SGPT)    10    WBC  9.68      Hemoglobin  15.0      Hematocrit  44.3      Platelets  369      Total Cholesterol   156     Triglycerides   166 (A)     HDL Cholesterol   38 (A)     LDL Cholesterol    89     Hemoglobin A1C 5.61 (A)       Microalbumin, Urine     <1.2   (A) Abnormal " value                        Current Outpatient Medications on File Prior to Visit   Medication Sig Dispense Refill   • [DISCONTINUED] albuterol sulfate  (90 Base) MCG/ACT inhaler Inhale 2 puffs Every 6 (Six) Hours. 8.6 g 1   • [DISCONTINUED] aspirin 81 MG chewable tablet Chew 1 tablet Daily. 90 tablet 1   • [DISCONTINUED] brompheniramine-pseudoephedrine-DM 30-2-10 MG/5ML syrup Take 5 mL by mouth 4 (Four) Times a Day As Needed for Congestion or Allergies. 473 mL 1   • [DISCONTINUED] calcium carbonate-vitamin d 600-400 MG-UNIT per tablet Take 1 tablet by mouth 2 (two) times a day. 180 tablet 1   • [DISCONTINUED] cyclobenzaprine (FLEXERIL) 10 MG tablet Take 1 tablet by mouth 3 (Three) Times a Day. 45 tablet 1   • [DISCONTINUED] doxycycline (VIBRAMYCIN) 100 MG capsule Take 1 capsule by mouth 2 (Two) Times a Day. 20 capsule 0   • [DISCONTINUED] fexofenadine (ALLEGRA) 180 MG tablet Take 1 tablet by mouth Daily. 90 tablet 1   • [DISCONTINUED] fluticasone (FLONASE) 50 MCG/ACT nasal spray 1 spray into the nostril(s) as directed by provider Daily. 1 mL 5   • [DISCONTINUED] meloxicam (MOBIC) 7.5 MG tablet Take 1 tablet by mouth Daily. 90 tablet 1   • [DISCONTINUED] metFORMIN (GLUCOPHAGE) 500 MG tablet Take 1 tablet by mouth Daily. 90 tablet 1   • [DISCONTINUED] montelukast (SINGULAIR) 10 MG tablet Take 1 tablet by mouth Daily. 90 tablet 1   • [DISCONTINUED] potassium chloride (K-DUR,KLOR-CON) 20 MEQ CR tablet Take 1 tablet by mouth Daily. 90 tablet 1   • [DISCONTINUED] pravastatin (PRAVACHOL) 10 MG tablet Take 1 tablet by mouth Daily. 90 tablet 1   • [DISCONTINUED] vitamin D (ERGOCALCIFEROL) 1.25 MG (10839 UT) capsule capsule      • [DISCONTINUED] fluticasone-salmeterol (Advair HFA) 230-21 MCG/ACT inhaler Inhale 2 puffs 2 (two) times a day for 90 days. 12 g 2   • [DISCONTINUED] hydroCHLOROthiazide (HYDRODIURIL) 25 MG tablet Take 1 tablet by mouth Daily for 90 days. 90 tablet 1     No current facility-administered  medications on file prior to visit.        Assessment and Plan    Diagnoses and all orders for this visit:    1. Follow-up exam, 3-6 months since previous exam (Primary)  -     DEXA Bone Density Axial  -     CBC Auto Differential  -     Comprehensive Metabolic Panel  -     Hemoglobin A1c  -     Lipid Panel  -     MicroAlbumin, Urine, Random - Urine, Clean Catch  -     TSH  -     Vitamin D 25 Hydroxy  -     Vitamin B12 & Folate  -     Iron Profile  -     Ferritin    2. Type 2 diabetes mellitus without complication, without long-term current use of insulin (HCC)  Comments:  Controlled we will recheck labs adjust medication if needed  Orders:  -     DEXA Bone Density Axial  -     CBC Auto Differential  -     Comprehensive Metabolic Panel  -     Hemoglobin A1c  -     Lipid Panel  -     MicroAlbumin, Urine, Random - Urine, Clean Catch  -     TSH  -     Vitamin D 25 Hydroxy  -     Vitamin B12 & Folate  -     Iron Profile  -     Ferritin    3. Mixed hyperlipidemia  Comments:  Will check labs, adjust medication if needed  Orders:  -     DEXA Bone Density Axial  -     CBC Auto Differential  -     Comprehensive Metabolic Panel  -     Hemoglobin A1c  -     Lipid Panel  -     MicroAlbumin, Urine, Random - Urine, Clean Catch  -     TSH  -     Vitamin D 25 Hydroxy  -     Vitamin B12 & Folate  -     Iron Profile  -     Ferritin    4. Essential hypertension  Comments:  Stable medication continue medication  Orders:  -     DEXA Bone Density Axial  -     CBC Auto Differential  -     Comprehensive Metabolic Panel  -     Hemoglobin A1c  -     Lipid Panel  -     MicroAlbumin, Urine, Random - Urine, Clean Catch  -     TSH  -     Vitamin D 25 Hydroxy  -     Vitamin B12 & Folate  -     Iron Profile  -     Ferritin    5. Vitamin D deficiency  -     DEXA Bone Density Axial  -     CBC Auto Differential  -     Comprehensive Metabolic Panel  -     Hemoglobin A1c  -     Lipid Panel  -     MicroAlbumin, Urine, Random - Urine, Clean Catch  -      TSH  -     Vitamin D 25 Hydroxy  -     Vitamin B12 & Folate  -     Iron Profile  -     Ferritin    6. Seasonal allergic rhinitis due to pollen  -     DEXA Bone Density Axial  -     CBC Auto Differential  -     Comprehensive Metabolic Panel  -     Hemoglobin A1c  -     Lipid Panel  -     MicroAlbumin, Urine, Random - Urine, Clean Catch  -     TSH  -     Vitamin D 25 Hydroxy  -     Vitamin B12 & Folate  -     Iron Profile  -     Ferritin    7. Post-menopausal  Comments:  Will order DEXA scan  Orders:  -     DEXA Bone Density Axial  -     CBC Auto Differential  -     Comprehensive Metabolic Panel  -     Hemoglobin A1c  -     Lipid Panel  -     MicroAlbumin, Urine, Random - Urine, Clean Catch  -     TSH  -     Vitamin D 25 Hydroxy  -     Vitamin B12 & Folate  -     Iron Profile  -     Ferritin    8. Encounter for screening mammogram for malignant neoplasm of breast  Comments:  Will order mammogram for yearly screening  Orders:  -     DEXA Bone Density Axial  -     CBC Auto Differential  -     Comprehensive Metabolic Panel  -     Hemoglobin A1c  -     Lipid Panel  -     MicroAlbumin, Urine, Random - Urine, Clean Catch  -     TSH  -     Vitamin D 25 Hydroxy  -     Vitamin B12 & Folate  -     Iron Profile  -     Ferritin  -     Mammo Screening Digital Tomosynthesis Bilateral With CAD    9. Fatigue, unspecified type  Comments:  Will check labs to rule out any vitamin deficiency or acute abnormalities.  Orders:  -     DEXA Bone Density Axial  -     CBC Auto Differential  -     Comprehensive Metabolic Panel  -     Hemoglobin A1c  -     Lipid Panel  -     MicroAlbumin, Urine, Random - Urine, Clean Catch  -     TSH  -     Vitamin D 25 Hydroxy  -     Vitamin B12 & Folate  -     Iron Profile  -     Ferritin    Other orders  -     albuterol sulfate  (90 Base) MCG/ACT inhaler; Inhale 2 puffs Every 6 (Six) Hours.  Dispense: 8.6 g; Refill: 1  -     aspirin 81 MG chewable tablet; Chew 1 tablet Daily.  Dispense: 90 tablet;  Refill: 1  -     calcium carbonate-vitamin d 600-400 MG-UNIT per tablet; Take 1 tablet by mouth 2 (Two) Times a Day.  Dispense: 180 tablet; Refill: 1  -     cyclobenzaprine (FLEXERIL) 10 MG tablet; Take 1 tablet by mouth 3 (Three) Times a Day.  Dispense: 45 tablet; Refill: 1  -     fexofenadine (ALLEGRA) 180 MG tablet; Take 1 tablet by mouth Daily.  Dispense: 90 tablet; Refill: 1  -     fluticasone (FLONASE) 50 MCG/ACT nasal spray; 1 spray into the nostril(s) as directed by provider Daily.  Dispense: 1 mL; Refill: 5  -     fluticasone-salmeterol (Advair HFA) 230-21 MCG/ACT inhaler; Inhale 2 puffs 2 (Two) Times a Day for 90 days.  Dispense: 8 g; Refill: 1  -     hydroCHLOROthiazide (HYDRODIURIL) 25 MG tablet; Take 1 tablet by mouth Daily for 90 days.  Dispense: 90 tablet; Refill: 1  -     meloxicam (MOBIC) 7.5 MG tablet; Take 1 tablet by mouth Daily.  Dispense: 90 tablet; Refill: 1  -     metFORMIN (GLUCOPHAGE) 500 MG tablet; Take 1 tablet by mouth Daily.  Dispense: 90 tablet; Refill: 1  -     montelukast (SINGULAIR) 10 MG tablet; Take 1 tablet by mouth Daily.  Dispense: 90 tablet; Refill: 1  -     potassium chloride (K-DUR,KLOR-CON) 20 MEQ CR tablet; Take 1 tablet by mouth Daily.  Dispense: 90 tablet; Refill: 1  -     pravastatin (PRAVACHOL) 10 MG tablet; Take 1 tablet by mouth Daily.  Dispense: 90 tablet; Refill: 1  -     vitamin D (ERGOCALCIFEROL) 1.25 MG (90374 UT) capsule capsule; Take 1 capsule by mouth Every 7 (Seven) Days.  Dispense: 13 capsule; Refill: 1        Follow Up   Return in about 6 months (around 6/20/2022) for Recheck.  Patient was given instructions and counseling regarding her condition or for health maintenance advice. Please see specific information pulled into the AVS if appropriate.

## 2021-12-22 RX ORDER — SYRINGE W-NEEDLE,DISPOSAB,3 ML 25GX5/8"
1 SYRINGE, EMPTY DISPOSABLE MISCELLANEOUS
Qty: 20 EACH | Refills: 0 | Status: SHIPPED | OUTPATIENT
Start: 2021-12-22 | End: 2022-04-05

## 2021-12-22 RX ORDER — CYANOCOBALAMIN 1000 UG/ML
1000 INJECTION, SOLUTION INTRAMUSCULAR; SUBCUTANEOUS
Qty: 3 ML | Refills: 1 | Status: SHIPPED | OUTPATIENT
Start: 2021-12-22 | End: 2022-03-22

## 2021-12-22 RX ORDER — PEN NEEDLE, DIABETIC 31 GX5/16"
1 NEEDLE, DISPOSABLE MISCELLANEOUS DAILY PRN
Qty: 100 EACH | Refills: 1 | Status: SHIPPED | OUTPATIENT
Start: 2021-12-22

## 2022-03-29 ENCOUNTER — APPOINTMENT (OUTPATIENT)
Dept: BONE DENSITY | Facility: HOSPITAL | Age: 77
End: 2022-03-29

## 2022-03-29 ENCOUNTER — APPOINTMENT (OUTPATIENT)
Dept: MAMMOGRAPHY | Facility: HOSPITAL | Age: 77
End: 2022-03-29

## 2022-03-31 ENCOUNTER — TELEPHONE (OUTPATIENT)
Dept: FAMILY MEDICINE CLINIC | Facility: CLINIC | Age: 77
End: 2022-03-31

## 2022-03-31 NOTE — TELEPHONE ENCOUNTER
Caller: Gaby Reyes    Relationship to patient: Self    Best call back number: [phone]    Chief complaint: RASH UNDER LEFT BREAST    Type of visit: OFFICE/MYCHART    Requested date: ASAP       Additional notes:THE PATIENT WOULD LIKE A CALL BACK TO SCHEDULE WITH ANY PROVIDER ASA. SHE STATED SHE CANNOT FOR PCP APPOINTMENT. A DETAILED VOICEMAIL CAN BE LEFT IF SHE CANNOT ANSWER

## 2022-04-05 ENCOUNTER — OFFICE VISIT (OUTPATIENT)
Dept: FAMILY MEDICINE CLINIC | Facility: CLINIC | Age: 77
End: 2022-04-05

## 2022-04-05 VITALS
TEMPERATURE: 98.3 F | WEIGHT: 217 LBS | SYSTOLIC BLOOD PRESSURE: 128 MMHG | DIASTOLIC BLOOD PRESSURE: 72 MMHG | OXYGEN SATURATION: 98 % | HEART RATE: 75 BPM | HEIGHT: 67 IN | BODY MASS INDEX: 34.06 KG/M2

## 2022-04-05 DIAGNOSIS — B37.31 VAGINAL CANDIDIASIS: Primary | ICD-10-CM

## 2022-04-05 DIAGNOSIS — B37.2 CUTANEOUS CANDIDIASIS: ICD-10-CM

## 2022-04-05 PROCEDURE — 99213 OFFICE O/P EST LOW 20 MIN: CPT | Performed by: NURSE PRACTITIONER

## 2022-04-05 RX ORDER — NYSTATIN 100000 [USP'U]/G
POWDER TOPICAL 2 TIMES DAILY
Qty: 30 G | Refills: 0 | Status: SHIPPED | OUTPATIENT
Start: 2022-04-05 | End: 2022-06-20 | Stop reason: SDUPTHER

## 2022-04-05 RX ORDER — FLUCONAZOLE 150 MG/1
TABLET ORAL
Qty: 2 TABLET | Refills: 0 | Status: SHIPPED | OUTPATIENT
Start: 2022-04-05 | End: 2022-06-20

## 2022-04-05 NOTE — PROGRESS NOTES
Chief Complaint  rash under breast (Right breast/)    Subjective          Gaby Reyes presents to [facility]  History of Present Illness   76-year-old female presents today for an acute visit, she is a patient of CALISTA Coleman.  She is complaining of rash under her left breast for the past 5 days.  She is also complaining of vaginal itching.    Current Outpatient Medications on File Prior to Visit   Medication Sig Dispense Refill   • albuterol sulfate  (90 Base) MCG/ACT inhaler Inhale 2 puffs Every 6 (Six) Hours. 8.6 g 1   • Alcohol Swabs (Alcohol Prep) pads 1 each Daily As Needed (cleaning of skin) for up to 90 doses. 100 each 1   • aspirin 81 MG chewable tablet Chew 1 tablet Daily. 90 tablet 1   • calcium carbonate-vitamin d 600-400 MG-UNIT per tablet Take 1 tablet by mouth 2 (Two) Times a Day. 180 tablet 1   • fexofenadine (ALLEGRA) 180 MG tablet Take 1 tablet by mouth Daily. 90 tablet 1   • fluticasone (FLONASE) 50 MCG/ACT nasal spray 1 spray into the nostril(s) as directed by provider Daily. 1 mL 5   • meloxicam (MOBIC) 7.5 MG tablet Take 1 tablet by mouth Daily. 90 tablet 1   • metFORMIN (GLUCOPHAGE) 500 MG tablet Take 1 tablet by mouth Daily. 90 tablet 1   • montelukast (SINGULAIR) 10 MG tablet Take 1 tablet by mouth Daily. 90 tablet 1   • potassium chloride (K-DUR,KLOR-CON) 20 MEQ CR tablet Take 1 tablet by mouth Daily. 90 tablet 1   • pravastatin (PRAVACHOL) 10 MG tablet Take 1 tablet by mouth Daily. 90 tablet 1   • vitamin D (ERGOCALCIFEROL) 1.25 MG (34171 UT) capsule capsule Take 1 capsule by mouth Every 7 (Seven) Days. 13 capsule 1   • [DISCONTINUED] cyclobenzaprine (FLEXERIL) 10 MG tablet Take 1 tablet by mouth 3 (Three) Times a Day. 45 tablet 1   • fluticasone-salmeterol (Advair HFA) 230-21 MCG/ACT inhaler Inhale 2 puffs 2 (Two) Times a Day for 90 days. 8 g 1   • hydroCHLOROthiazide (HYDRODIURIL) 25 MG tablet Take 1 tablet by mouth Daily for 90 days. 90  "tablet 1   • [DISCONTINUED] Syringe 22G X 3/4\" 3 ML misc 1 each Every 28 (Twenty-Eight) Days. 20 each 0     No current facility-administered medications on file prior to visit.       Objective   Vital Signs:   /72   Pulse 75   Temp 98.3 °F (36.8 °C)   Ht 170.2 cm (67\")   Wt 98.4 kg (217 lb)   SpO2 98%   BMI 33.99 kg/m²     Physical Exam  Vitals reviewed.   Constitutional:       Appearance: Normal appearance. She is well-developed.   Neck:      Thyroid: No thyroid mass, thyromegaly or thyroid tenderness.   Cardiovascular:      Rate and Rhythm: Normal rate and regular rhythm.      Heart sounds: No murmur heard.    No friction rub. No gallop.   Pulmonary:      Effort: Pulmonary effort is normal.      Breath sounds: Normal breath sounds. No wheezing or rhonchi.   Lymphadenopathy:      Cervical: No cervical adenopathy.   Skin:     General: Skin is warm and dry.      Comments: Erythema noted under left breast.   Neurological:      Mental Status: She is alert and oriented to person, place, and time.      Cranial Nerves: No cranial nerve deficit.   Psychiatric:         Mood and Affect: Mood and affect normal.         Behavior: Behavior normal.         Thought Content: Thought content normal. Thought content does not include homicidal or suicidal ideation.         Judgment: Judgment normal.        Result Review :                 Assessment and Plan    Diagnoses and all orders for this visit:    1. Vaginal candidiasis (Primary)  Comments:  Diflucan x2 doses 72 hours apart.    2. Cutaneous candidiasis  Comments:  Nystatin powder twice daily.    Other orders  -     fluconazole (Diflucan) 150 MG tablet; One immediately and repeat dose in 72 hours  Dispense: 2 tablet; Refill: 0  -     nystatin (MYCOSTATIN) 829381 UNIT/GM powder; Apply  topically to the appropriate area as directed 2 (Two) Times a Day.  Dispense: 30 g; Refill: 0        Follow Up   No follow-ups on file.  Patient was given instructions and counseling " regarding her condition or for health maintenance advice. Please see specific information pulled into the AVS if appropriate.

## 2022-06-01 ENCOUNTER — HOSPITAL ENCOUNTER (OUTPATIENT)
Dept: MAMMOGRAPHY | Facility: HOSPITAL | Age: 77
Discharge: HOME | End: 2022-06-01

## 2022-06-01 ENCOUNTER — HOSPITAL ENCOUNTER (OUTPATIENT)
Dept: BONE DENSITY | Facility: HOSPITAL | Age: 77
Discharge: HOME | End: 2022-06-01

## 2022-06-01 PROCEDURE — 77067 SCR MAMMO BI INCL CAD: CPT

## 2022-06-01 PROCEDURE — 77080 DXA BONE DENSITY AXIAL: CPT

## 2022-06-01 PROCEDURE — 77063 BREAST TOMOSYNTHESIS BI: CPT

## 2022-06-20 ENCOUNTER — OFFICE VISIT (OUTPATIENT)
Dept: FAMILY MEDICINE CLINIC | Facility: CLINIC | Age: 77
End: 2022-06-20

## 2022-06-20 VITALS
HEART RATE: 74 BPM | SYSTOLIC BLOOD PRESSURE: 130 MMHG | BODY MASS INDEX: 33.59 KG/M2 | WEIGHT: 214 LBS | OXYGEN SATURATION: 98 % | HEIGHT: 67 IN | TEMPERATURE: 96.9 F | DIASTOLIC BLOOD PRESSURE: 82 MMHG

## 2022-06-20 DIAGNOSIS — E78.2 MIXED HYPERLIPIDEMIA: ICD-10-CM

## 2022-06-20 DIAGNOSIS — L98.9 SKIN LESION OF FACE: ICD-10-CM

## 2022-06-20 DIAGNOSIS — I10 ESSENTIAL HYPERTENSION: ICD-10-CM

## 2022-06-20 DIAGNOSIS — Z11.59 ENCOUNTER FOR HEPATITIS C SCREENING TEST FOR LOW RISK PATIENT: ICD-10-CM

## 2022-06-20 DIAGNOSIS — E55.9 VITAMIN D DEFICIENCY: ICD-10-CM

## 2022-06-20 DIAGNOSIS — Z09 FOLLOW-UP EXAM, 3-6 MONTHS SINCE PREVIOUS EXAM: Primary | ICD-10-CM

## 2022-06-20 DIAGNOSIS — E11.9 TYPE 2 DIABETES MELLITUS WITHOUT COMPLICATION, WITHOUT LONG-TERM CURRENT USE OF INSULIN: ICD-10-CM

## 2022-06-20 DIAGNOSIS — E53.8 VITAMIN B12 DEFICIENCY: ICD-10-CM

## 2022-06-20 LAB
25(OH)D3 SERPL-MCNC: 45 NG/ML (ref 30–100)
ALBUMIN SERPL-MCNC: 4.2 G/DL (ref 3.5–5.2)
ALBUMIN/GLOB SERPL: 1.4 G/DL
ALP SERPL-CCNC: 81 U/L (ref 39–117)
ALT SERPL W P-5'-P-CCNC: 20 U/L (ref 1–33)
ANION GAP SERPL CALCULATED.3IONS-SCNC: 13.3 MMOL/L (ref 5–15)
AST SERPL-CCNC: 26 U/L (ref 1–32)
BASOPHILS # BLD AUTO: 0.06 10*3/MM3 (ref 0–0.2)
BASOPHILS NFR BLD AUTO: 0.8 % (ref 0–1.5)
BILIRUB SERPL-MCNC: 0.6 MG/DL (ref 0–1.2)
BUN SERPL-MCNC: 14 MG/DL (ref 8–23)
BUN/CREAT SERPL: 20 (ref 7–25)
CALCIUM SPEC-SCNC: 9.8 MG/DL (ref 8.6–10.5)
CHLORIDE SERPL-SCNC: 100 MMOL/L (ref 98–107)
CHOLEST SERPL-MCNC: 152 MG/DL (ref 0–200)
CO2 SERPL-SCNC: 23.7 MMOL/L (ref 22–29)
CREAT SERPL-MCNC: 0.7 MG/DL (ref 0.57–1)
DEPRECATED RDW RBC AUTO: 40.6 FL (ref 37–54)
EGFRCR SERPLBLD CKD-EPI 2021: 90.9 ML/MIN/1.73
EOSINOPHIL # BLD AUTO: 0.17 10*3/MM3 (ref 0–0.4)
EOSINOPHIL NFR BLD AUTO: 2.1 % (ref 0.3–6.2)
ERYTHROCYTE [DISTWIDTH] IN BLOOD BY AUTOMATED COUNT: 13.3 % (ref 12.3–15.4)
FOLATE SERPL-MCNC: 9.62 NG/ML (ref 4.78–24.2)
GLOBULIN UR ELPH-MCNC: 3.1 GM/DL
GLUCOSE SERPL-MCNC: 85 MG/DL (ref 65–99)
HBA1C MFR BLD: 5.4 % (ref 4.8–5.6)
HCT VFR BLD AUTO: 45.3 % (ref 34–46.6)
HCV AB SER DONR QL: NORMAL
HDLC SERPL-MCNC: 39 MG/DL (ref 40–60)
HGB BLD-MCNC: 15.3 G/DL (ref 12–15.9)
IMM GRANULOCYTES # BLD AUTO: 0.02 10*3/MM3 (ref 0–0.05)
IMM GRANULOCYTES NFR BLD AUTO: 0.3 % (ref 0–0.5)
LDLC SERPL CALC-MCNC: 87 MG/DL (ref 0–100)
LDLC/HDLC SERPL: 2.12 {RATIO}
LYMPHOCYTES # BLD AUTO: 3.33 10*3/MM3 (ref 0.7–3.1)
LYMPHOCYTES NFR BLD AUTO: 41.7 % (ref 19.6–45.3)
MCH RBC QN AUTO: 28.3 PG (ref 26.6–33)
MCHC RBC AUTO-ENTMCNC: 33.8 G/DL (ref 31.5–35.7)
MCV RBC AUTO: 83.7 FL (ref 79–97)
MONOCYTES # BLD AUTO: 0.62 10*3/MM3 (ref 0.1–0.9)
MONOCYTES NFR BLD AUTO: 7.8 % (ref 5–12)
NEUTROPHILS NFR BLD AUTO: 3.79 10*3/MM3 (ref 1.7–7)
NEUTROPHILS NFR BLD AUTO: 47.3 % (ref 42.7–76)
NRBC BLD AUTO-RTO: 0 /100 WBC (ref 0–0.2)
PLATELET # BLD AUTO: 362 10*3/MM3 (ref 140–450)
PMV BLD AUTO: 11.2 FL (ref 6–12)
POTASSIUM SERPL-SCNC: 3.8 MMOL/L (ref 3.5–5.2)
PROT SERPL-MCNC: 7.3 G/DL (ref 6–8.5)
RBC # BLD AUTO: 5.41 10*6/MM3 (ref 3.77–5.28)
SODIUM SERPL-SCNC: 137 MMOL/L (ref 136–145)
TRIGL SERPL-MCNC: 151 MG/DL (ref 0–150)
TSH SERPL DL<=0.05 MIU/L-ACNC: 2.15 UIU/ML (ref 0.27–4.2)
VIT B12 BLD-MCNC: 442 PG/ML (ref 211–946)
VLDLC SERPL-MCNC: 26 MG/DL (ref 5–40)
WBC NRBC COR # BLD: 7.99 10*3/MM3 (ref 3.4–10.8)

## 2022-06-20 PROCEDURE — 86803 HEPATITIS C AB TEST: CPT | Performed by: NURSE PRACTITIONER

## 2022-06-20 PROCEDURE — 84443 ASSAY THYROID STIM HORMONE: CPT | Performed by: NURSE PRACTITIONER

## 2022-06-20 PROCEDURE — 82306 VITAMIN D 25 HYDROXY: CPT | Performed by: NURSE PRACTITIONER

## 2022-06-20 PROCEDURE — 99214 OFFICE O/P EST MOD 30 MIN: CPT | Performed by: NURSE PRACTITIONER

## 2022-06-20 PROCEDURE — 80061 LIPID PANEL: CPT | Performed by: NURSE PRACTITIONER

## 2022-06-20 PROCEDURE — 82607 VITAMIN B-12: CPT | Performed by: NURSE PRACTITIONER

## 2022-06-20 PROCEDURE — 85025 COMPLETE CBC W/AUTO DIFF WBC: CPT | Performed by: NURSE PRACTITIONER

## 2022-06-20 PROCEDURE — 36415 COLL VENOUS BLD VENIPUNCTURE: CPT | Performed by: NURSE PRACTITIONER

## 2022-06-20 PROCEDURE — 82746 ASSAY OF FOLIC ACID SERUM: CPT | Performed by: NURSE PRACTITIONER

## 2022-06-20 PROCEDURE — 80053 COMPREHEN METABOLIC PANEL: CPT | Performed by: NURSE PRACTITIONER

## 2022-06-20 PROCEDURE — 83036 HEMOGLOBIN GLYCOSYLATED A1C: CPT | Performed by: NURSE PRACTITIONER

## 2022-06-20 RX ORDER — ASPIRIN 81 MG/1
81 TABLET, CHEWABLE ORAL DAILY
Qty: 90 TABLET | Refills: 1 | Status: SHIPPED | OUTPATIENT
Start: 2022-06-20 | End: 2023-01-20 | Stop reason: SDUPTHER

## 2022-06-20 RX ORDER — CYANOCOBALAMIN 1000 UG/ML
1000 INJECTION, SOLUTION INTRAMUSCULAR; SUBCUTANEOUS
Qty: 3 ML | Refills: 3 | Status: SHIPPED | OUTPATIENT
Start: 2022-06-20

## 2022-06-20 RX ORDER — HYDROCHLOROTHIAZIDE 25 MG/1
25 TABLET ORAL DAILY
Qty: 90 TABLET | Refills: 1 | Status: SHIPPED | OUTPATIENT
Start: 2022-06-20 | End: 2023-01-03 | Stop reason: SDUPTHER

## 2022-06-20 RX ORDER — FLUTICASONE PROPIONATE 50 MCG
1 SPRAY, SUSPENSION (ML) NASAL DAILY
Qty: 1 ML | Refills: 5 | Status: SHIPPED | OUTPATIENT
Start: 2022-06-20

## 2022-06-20 RX ORDER — ERGOCALCIFEROL 1.25 MG/1
50000 CAPSULE ORAL
Qty: 13 CAPSULE | Refills: 1 | Status: SHIPPED | OUTPATIENT
Start: 2022-06-20 | End: 2023-01-20 | Stop reason: SDUPTHER

## 2022-06-20 RX ORDER — ALBUTEROL SULFATE 90 UG/1
2 AEROSOL, METERED RESPIRATORY (INHALATION) EVERY 6 HOURS
Qty: 8.6 G | Refills: 1 | Status: SHIPPED | OUTPATIENT
Start: 2022-06-20 | End: 2023-01-20 | Stop reason: SDUPTHER

## 2022-06-20 RX ORDER — FEXOFENADINE HCL 180 MG/1
180 TABLET ORAL 2 TIMES DAILY
Qty: 180 TABLET | Refills: 2 | Status: SHIPPED | OUTPATIENT
Start: 2022-06-20 | End: 2023-01-20 | Stop reason: SDUPTHER

## 2022-06-20 RX ORDER — FLUTICASONE PROPIONATE AND SALMETEROL XINAFOATE 230; 21 UG/1; UG/1
2 AEROSOL, METERED RESPIRATORY (INHALATION) 2 TIMES DAILY
Qty: 8 G | Refills: 1 | Status: SHIPPED | OUTPATIENT
Start: 2022-06-20 | End: 2023-01-20 | Stop reason: SDUPTHER

## 2022-06-20 RX ORDER — PRAVASTATIN SODIUM 10 MG
10 TABLET ORAL DAILY
Qty: 90 TABLET | Refills: 1 | Status: SHIPPED | OUTPATIENT
Start: 2022-06-20 | End: 2023-01-20 | Stop reason: SDUPTHER

## 2022-06-20 RX ORDER — MELOXICAM 7.5 MG/1
7.5 TABLET ORAL DAILY
Qty: 90 TABLET | Refills: 1 | Status: SHIPPED | OUTPATIENT
Start: 2022-06-20 | End: 2023-01-20

## 2022-06-20 RX ORDER — NYSTATIN 100000 [USP'U]/G
POWDER TOPICAL 2 TIMES DAILY
Qty: 30 G | Refills: 0 | Status: SHIPPED | OUTPATIENT
Start: 2022-06-20 | End: 2023-01-20 | Stop reason: SDUPTHER

## 2022-06-20 RX ORDER — POTASSIUM CHLORIDE 20 MEQ/1
20 TABLET, EXTENDED RELEASE ORAL DAILY
Qty: 90 TABLET | Refills: 1 | Status: SHIPPED | OUTPATIENT
Start: 2022-06-20 | End: 2023-01-03 | Stop reason: SDUPTHER

## 2022-06-20 NOTE — PROGRESS NOTES
Chief Complaint  Hyperlipidemia and Follow-up    Subjective          Medical History: has a past medical history of Allergic rhinitis (10/30/2014), Arthritis, Asthma, Bronchitis, Chronic pain of right knee (01/29/2015), Deafness, Gall stones (1997), High blood pressure, High cholesterol, Hyperlipemia, Hyperlipidemia (02/08/2018), Hypertension, Impaired fasting glucose (04/28/2014), Osteopenia (11/2015), Pneumonia, Pressure ulcer, stage 1, Reflux esophagitis, Seasonal allergies, Sinus trouble, and Type 2 diabetes mellitus without complication (HCC).     Surgical History: has a past surgical history that includes Colonoscopy; Gallbladder surgery (1997); Hysterectomy (1983); and Tonsillectomy.     Family History: family history includes Arthritis in her father; Cancer in her mother; Diabetes type II in her brother and mother; Heart disease (age of onset: 50) in her brother; Heart disease (age of onset: 59) in her father; Kidney cancer in her mother.     Social History: reports that she has never smoked. She has never used smokeless tobacco. She reports previous alcohol use. She reports that she does not use drugs.    Gaby Reyes presents to [facility]    Diabetes  She presents for her follow-up diabetic visit. She has type 2 diabetes mellitus. Her disease course has been stable. Pertinent negatives for hypoglycemia include no headaches. Associated symptoms include fatigue. Pertinent negatives for diabetes include no polydipsia, no polyphagia, no polyuria and no visual change. There are no hypoglycemic complications. Symptoms are stable. There are no diabetic complications. Risk factors for coronary artery disease include diabetes mellitus, dyslipidemia, obesity, hypertension and post-menopausal. Current diabetic treatment includes oral agent (monotherapy). She is compliant with treatment most of the time. She is following a generally healthy diet. When asked about meal planning, she  reported none. An ACE inhibitor/angiotensin II receptor blocker is being taken. She does not see a podiatrist.Eye exam is not current.     Hyperlipidemia  This is a chronic problem. The current episode started more than 1 year ago. The problem is controlled. Exacerbating diseases include diabetes and obesity. Pertinent negatives include no shortness of breath. Current antihyperlipidemic treatment includes statins. The current treatment provides significant improvement of lipids. There are no compliance problems.  Risk factors for coronary artery disease include diabetes mellitus, dyslipidemia, hypertension, obesity and post-menopausal.     Hypertension  This is a chronic problem. The current episode started more than 1 year ago. The problem is controlled. Pertinent negatives include no anxiety, headaches, palpitations, peripheral edema or shortness of breath. Risk factors for coronary artery disease include obesity, post-menopausal state, diabetes mellitus and dyslipidemia. Past treatments include direct vasodilators. Current antihypertension treatment includes diuretics. The current treatment provides significant improvement. There are no compliance problems.      Vitamin D  This is a chronic problem.  Current episode has been for longer than 6 months.  Problem has gradually been improving since she has been on vitamin D supplements.  She denies any excessive fatigue, hair loss, skin changes due to the vitamin D deficiency.  She tries to eat a well-balanced diet.  She has been on the vitamin D weekly, no compliance problems.  Condition is stable.    Patient has a changing skin lesion on the left side of her face.  She states that it is starting to get thicker and scaling over.  She has had the lesion for several years which is started to change.  No previous history of skin cancer.  Does have a history of being exposed to the sun.  Would like a referral over to dermatology for further management.    Patient was  "diagnosed 6 months ago with vitamin B12 deficiency.  She went to the pharmacy to  her vitamin B12 she was given the Danial but no needles to get the injections.  Patient has not been taking her vitamin B12 injections because she did not have any needles.  She is currently taking oral supplements.  Has not noticed a huge difference in the fatigue.        Objective   Vital Signs:   /82   Pulse 74   Temp 96.9 °F (36.1 °C)   Ht 170.2 cm (67\")   Wt 97.1 kg (214 lb)   SpO2 98%   BMI 33.52 kg/m²     Physical Exam  Vitals reviewed.   Constitutional:       Appearance: Normal appearance. She is well-developed. She is obese.   HENT:      Head: Normocephalic and atraumatic.      Right Ear: Tympanic membrane and external ear normal.      Left Ear: Tympanic membrane and external ear normal.   Eyes:      Conjunctiva/sclera: Conjunctivae normal.      Pupils: Pupils are equal, round, and reactive to light.   Cardiovascular:      Rate and Rhythm: Normal rate and regular rhythm.      Heart sounds: No murmur heard.    No friction rub.   Pulmonary:      Effort: Pulmonary effort is normal.      Breath sounds: Normal breath sounds. No wheezing or rhonchi.   Skin:     General: Skin is warm and dry.   Neurological:      Mental Status: She is alert and oriented to person, place, and time.   Psychiatric:         Mood and Affect: Mood and affect normal.         Behavior: Behavior normal.         Thought Content: Thought content normal.         Judgment: Judgment normal.        Result Review :   The following data was reviewed by: CALISTA Mejia on 06/20/2022:  Common labs    Common Labsle 12/20/21 12/20/21 12/20/21 12/20/21 12/20/21    1600 1600 1600 1600 1600   Glucose     105 (A)   BUN     22   Creatinine     0.61   eGFR Non African Am     96   Sodium     138   Potassium     3.5   Chloride     102   Calcium     9.6   Albumin     4.00   Total Bilirubin     0.4   Alkaline Phosphatase     75   AST (SGOT)     10 "   ALT (SGPT)     11   WBC 7.64       Hemoglobin 14.7       Hematocrit 42.7       Platelets 342       Total Cholesterol    146    Triglycerides    165 (A)    HDL Cholesterol    42    LDL Cholesterol     76    Hemoglobin A1C   5.86 (A)     Microalbumin, Urine  <1.2      (A) Abnormal value                        Current Outpatient Medications on File Prior to Visit   Medication Sig Dispense Refill   • Alcohol Swabs (Alcohol Prep) pads 1 each Daily As Needed (cleaning of skin) for up to 90 doses. 100 each 1   • [DISCONTINUED] albuterol sulfate  (90 Base) MCG/ACT inhaler Inhale 2 puffs Every 6 (Six) Hours. 8.6 g 1   • [DISCONTINUED] aspirin 81 MG chewable tablet Chew 1 tablet Daily. 90 tablet 1   • [DISCONTINUED] calcium carbonate-vitamin d 600-400 MG-UNIT per tablet Take 1 tablet by mouth 2 (Two) Times a Day. 180 tablet 1   • [DISCONTINUED] fexofenadine (ALLEGRA) 180 MG tablet Take 1 tablet by mouth Daily. 90 tablet 1   • [DISCONTINUED] fluticasone (FLONASE) 50 MCG/ACT nasal spray 1 spray into the nostril(s) as directed by provider Daily. 1 mL 5   • [DISCONTINUED] meloxicam (MOBIC) 7.5 MG tablet Take 1 tablet by mouth Daily. 90 tablet 1   • [DISCONTINUED] metFORMIN (GLUCOPHAGE) 500 MG tablet Take 1 tablet by mouth Daily. 90 tablet 1   • [DISCONTINUED] nystatin (MYCOSTATIN) 525518 UNIT/GM powder Apply  topically to the appropriate area as directed 2 (Two) Times a Day. 30 g 0   • [DISCONTINUED] potassium chloride (K-DUR,KLOR-CON) 20 MEQ CR tablet Take 1 tablet by mouth Daily. 90 tablet 1   • [DISCONTINUED] pravastatin (PRAVACHOL) 10 MG tablet Take 1 tablet by mouth Daily. 90 tablet 1   • [DISCONTINUED] vitamin D (ERGOCALCIFEROL) 1.25 MG (72961 UT) capsule capsule Take 1 capsule by mouth Every 7 (Seven) Days. 13 capsule 1   • [DISCONTINUED] fluconazole (Diflucan) 150 MG tablet One immediately and repeat dose in 72 hours 2 tablet 0   • [DISCONTINUED] fluticasone-salmeterol (Advair HFA) 230-21 MCG/ACT inhaler Inhale 2  puffs 2 (Two) Times a Day for 90 days. 8 g 1   • [DISCONTINUED] hydroCHLOROthiazide (HYDRODIURIL) 25 MG tablet Take 1 tablet by mouth Daily for 90 days. 90 tablet 1   • [DISCONTINUED] montelukast (SINGULAIR) 10 MG tablet Take 1 tablet by mouth Daily. 90 tablet 1     No current facility-administered medications on file prior to visit.        Assessment and Plan    Diagnoses and all orders for this visit:    1. Follow-up exam, 3-6 months since previous exam (Primary)  -     CBC & Differential  -     Comprehensive Metabolic Panel  -     Lipid Panel  -     TSH    2. Skin lesion of face  Comments:  Will refer to dermatology, patient requested Jeyson Tapia dermatologist.  Orders:  -     CBC & Differential  -     Comprehensive Metabolic Panel  -     Lipid Panel  -     TSH  -     Ambulatory Referral to Dermatology    3. Type 2 diabetes mellitus without complication, without long-term current use of insulin (HCC)  Comments:  Will check labs, adjust medication if needed  Orders:  -     CBC & Differential  -     Comprehensive Metabolic Panel  -     Lipid Panel  -     TSH  -     Hemoglobin A1c    4. Mixed hyperlipidemia  Comments:  Will check labs, adjust medication if needed  Orders:  -     CBC & Differential  -     Comprehensive Metabolic Panel  -     Lipid Panel  -     TSH    5. Essential hypertension  Comments:  Blood pressure stable at 132/80.  We will continue on current medication regimen..    6. Vitamin B12 deficiency  -     Vitamin B12 & Folate    7. Vitamin D deficiency  -     Vitamin D 25 Hydroxy    8. Encounter for hepatitis C screening test for low risk patient  -     Hepatitis C antibody    Other orders  -     cyanocobalamin 1000 MCG/ML injection; Inject 1 mL into the appropriate muscle as directed by prescriber Every 28 (Twenty-Eight) Days.  Dispense: 3 mL; Refill: 3  -     fexofenadine (ALLEGRA) 180 MG tablet; Take 1 tablet by mouth 2 (Two) Times a Day.  Dispense: 180 tablet; Refill: 2  -     potassium  chloride (K-DUR,KLOR-CON) 20 MEQ CR tablet; Take 1 tablet by mouth Daily.  Dispense: 90 tablet; Refill: 1  -     vitamin D (ERGOCALCIFEROL) 1.25 MG (51610 UT) capsule capsule; Take 1 capsule by mouth Every 7 (Seven) Days.  Dispense: 13 capsule; Refill: 1  -     pravastatin (PRAVACHOL) 10 MG tablet; Take 1 tablet by mouth Daily.  Dispense: 90 tablet; Refill: 1  -     nystatin (MYCOSTATIN) 537571 UNIT/GM powder; Apply  topically to the appropriate area as directed 2 (Two) Times a Day.  Dispense: 30 g; Refill: 0  -     metFORMIN (GLUCOPHAGE) 500 MG tablet; Take 1 tablet by mouth Daily.  Dispense: 90 tablet; Refill: 1  -     meloxicam (MOBIC) 7.5 MG tablet; Take 1 tablet by mouth Daily.  Dispense: 90 tablet; Refill: 1  -     hydroCHLOROthiazide (HYDRODIURIL) 25 MG tablet; Take 1 tablet by mouth Daily for 90 days.  Dispense: 90 tablet; Refill: 1  -     fluticasone-salmeterol (Advair HFA) 230-21 MCG/ACT inhaler; Inhale 2 puffs 2 (Two) Times a Day for 90 days.  Dispense: 8 g; Refill: 1  -     fluticasone (FLONASE) 50 MCG/ACT nasal spray; 1 spray into the nostril(s) as directed by provider Daily.  Dispense: 1 mL; Refill: 5  -     calcium carbonate-vitamin d 600-400 MG-UNIT per tablet; Take 1 tablet by mouth 2 (Two) Times a Day.  Dispense: 180 tablet; Refill: 1  -     aspirin 81 MG chewable tablet; Chew 1 tablet Daily.  Dispense: 90 tablet; Refill: 1  -     albuterol sulfate  (90 Base) MCG/ACT inhaler; Inhale 2 puffs Every 6 (Six) Hours.  Dispense: 8.6 g; Refill: 1        Follow Up   Return in about 6 months (around 12/20/2022) for Next scheduled follow up.  Patient was given instructions and counseling regarding her condition or for health maintenance advice. Please see specific information pulled into the AVS if appropriate.

## 2023-01-03 RX ORDER — POTASSIUM CHLORIDE 20 MEQ/1
20 TABLET, EXTENDED RELEASE ORAL DAILY
Qty: 90 TABLET | Refills: 1 | Status: SHIPPED | OUTPATIENT
Start: 2023-01-03 | End: 2023-01-04 | Stop reason: SDUPTHER

## 2023-01-03 RX ORDER — HYDROCHLOROTHIAZIDE 25 MG/1
25 TABLET ORAL DAILY
Qty: 90 TABLET | Refills: 1 | Status: SHIPPED | OUTPATIENT
Start: 2023-01-03 | End: 2023-01-04 | Stop reason: SDUPTHER

## 2023-01-04 ENCOUNTER — TELEPHONE (OUTPATIENT)
Dept: FAMILY MEDICINE CLINIC | Facility: CLINIC | Age: 78
End: 2023-01-04
Payer: MEDICARE

## 2023-01-04 RX ORDER — POTASSIUM CHLORIDE 20 MEQ/1
20 TABLET, EXTENDED RELEASE ORAL DAILY
Qty: 90 TABLET | Refills: 1 | Status: SHIPPED | OUTPATIENT
Start: 2023-01-04 | End: 2023-01-20 | Stop reason: SDUPTHER

## 2023-01-04 RX ORDER — HYDROCHLOROTHIAZIDE 25 MG/1
25 TABLET ORAL DAILY
Qty: 90 TABLET | Refills: 1 | Status: SHIPPED | OUTPATIENT
Start: 2023-01-04 | End: 2023-01-20 | Stop reason: SDUPTHER

## 2023-01-04 RX ORDER — HYDROCHLOROTHIAZIDE 25 MG/1
25 TABLET ORAL DAILY
Qty: 90 TABLET | Refills: 1 | Status: SHIPPED | OUTPATIENT
Start: 2023-01-04 | End: 2023-01-04 | Stop reason: SDUPTHER

## 2023-01-04 RX ORDER — POTASSIUM CHLORIDE 20 MEQ/1
20 TABLET, EXTENDED RELEASE ORAL DAILY
Qty: 90 TABLET | Refills: 1 | Status: SHIPPED | OUTPATIENT
Start: 2023-01-04 | End: 2023-01-04 | Stop reason: SDUPTHER

## 2023-01-04 NOTE — TELEPHONE ENCOUNTER
Caller: Gaby Reyes    Relationship: Self    Best call back number: [phone]    Requested Prescriptions:   Requested Prescriptions     Pending Prescriptions Disp Refills   • metFORMIN (GLUCOPHAGE) 500 MG tablet 90 tablet 1     Sig: Take 1 tablet by mouth Daily.   • potassium chloride (K-DUR,KLOR-CON) 20 MEQ CR tablet 90 tablet 1     Sig: Take 1 tablet by mouth Daily.   • hydroCHLOROthiazide (HYDRODIURIL) 25 MG tablet 90 tablet 1     Sig: Take 1 tablet by mouth Daily for 90 days.        Pharmacy where request should be sent: [facility], [place] - [address] [facility] [phone] [facility] [phone] [facility]       Does the patient have less than a 3 day supply:  [x] Yes  [] No    Would you like a call back once the refill request has been completed: [x] Yes [] No    If the office needs to give you a call back, can they leave a voicemail: [x] Yes [] No    Marcelo Dang Rep   01/04/23 10:37 EST

## 2023-01-20 ENCOUNTER — OFFICE VISIT (OUTPATIENT)
Dept: FAMILY MEDICINE CLINIC | Facility: CLINIC | Age: 78
End: 2023-01-20
Payer: MEDICARE

## 2023-01-20 VITALS
HEIGHT: 67 IN | HEART RATE: 91 BPM | BODY MASS INDEX: 32.8 KG/M2 | WEIGHT: 209 LBS | SYSTOLIC BLOOD PRESSURE: 124 MMHG | DIASTOLIC BLOOD PRESSURE: 68 MMHG | OXYGEN SATURATION: 99 % | TEMPERATURE: 97.5 F

## 2023-01-20 DIAGNOSIS — Z23 NEED FOR VACCINATION: ICD-10-CM

## 2023-01-20 DIAGNOSIS — E55.9 VITAMIN D DEFICIENCY: ICD-10-CM

## 2023-01-20 DIAGNOSIS — M19.90 OSTEOARTHRITIS, UNSPECIFIED OSTEOARTHRITIS TYPE, UNSPECIFIED SITE: ICD-10-CM

## 2023-01-20 DIAGNOSIS — I10 ESSENTIAL HYPERTENSION: ICD-10-CM

## 2023-01-20 DIAGNOSIS — E78.2 MIXED HYPERLIPIDEMIA: ICD-10-CM

## 2023-01-20 DIAGNOSIS — E11.9 TYPE 2 DIABETES MELLITUS WITHOUT COMPLICATION, WITHOUT LONG-TERM CURRENT USE OF INSULIN: ICD-10-CM

## 2023-01-20 DIAGNOSIS — Z00.00 ENCOUNTER FOR SUBSEQUENT ANNUAL WELLNESS VISIT (AWV) IN MEDICARE PATIENT: Primary | ICD-10-CM

## 2023-01-20 LAB
ALBUMIN SERPL-MCNC: 4.4 G/DL (ref 3.5–5.2)
ALBUMIN/GLOB SERPL: 1.4 G/DL
ALP SERPL-CCNC: 78 U/L (ref 39–117)
ALT SERPL W P-5'-P-CCNC: 14 U/L (ref 1–33)
ANION GAP SERPL CALCULATED.3IONS-SCNC: 13 MMOL/L (ref 5–15)
AST SERPL-CCNC: 21 U/L (ref 1–32)
BASOPHILS # BLD AUTO: 0.05 10*3/MM3 (ref 0–0.2)
BASOPHILS NFR BLD AUTO: 0.5 % (ref 0–1.5)
BILIRUB SERPL-MCNC: 0.6 MG/DL (ref 0–1.2)
BUN SERPL-MCNC: 25 MG/DL (ref 8–23)
BUN/CREAT SERPL: 33.3 (ref 7–25)
CALCIUM SPEC-SCNC: 10 MG/DL (ref 8.6–10.5)
CHLORIDE SERPL-SCNC: 99 MMOL/L (ref 98–107)
CHOLEST SERPL-MCNC: 150 MG/DL (ref 0–200)
CO2 SERPL-SCNC: 28 MMOL/L (ref 22–29)
CREAT SERPL-MCNC: 0.75 MG/DL (ref 0.57–1)
DEPRECATED RDW RBC AUTO: 39.1 FL (ref 37–54)
EGFRCR SERPLBLD CKD-EPI 2021: 83.1 ML/MIN/1.73
EOSINOPHIL # BLD AUTO: 0.06 10*3/MM3 (ref 0–0.4)
EOSINOPHIL NFR BLD AUTO: 0.7 % (ref 0.3–6.2)
ERYTHROCYTE [DISTWIDTH] IN BLOOD BY AUTOMATED COUNT: 12.9 % (ref 12.3–15.4)
GLOBULIN UR ELPH-MCNC: 3.1 GM/DL
GLUCOSE SERPL-MCNC: 95 MG/DL (ref 65–99)
HBA1C MFR BLD: 5.6 % (ref 4.8–5.6)
HCT VFR BLD AUTO: 43.7 % (ref 34–46.6)
HDLC SERPL-MCNC: 42 MG/DL (ref 40–60)
HGB BLD-MCNC: 15.1 G/DL (ref 12–15.9)
IMM GRANULOCYTES # BLD AUTO: 0.02 10*3/MM3 (ref 0–0.05)
IMM GRANULOCYTES NFR BLD AUTO: 0.2 % (ref 0–0.5)
LDLC SERPL CALC-MCNC: 81 MG/DL (ref 0–100)
LDLC/HDLC SERPL: 1.83 {RATIO}
LYMPHOCYTES # BLD AUTO: 2.84 10*3/MM3 (ref 0.7–3.1)
LYMPHOCYTES NFR BLD AUTO: 31.1 % (ref 19.6–45.3)
MCH RBC QN AUTO: 29 PG (ref 26.6–33)
MCHC RBC AUTO-ENTMCNC: 34.6 G/DL (ref 31.5–35.7)
MCV RBC AUTO: 83.9 FL (ref 79–97)
MONOCYTES # BLD AUTO: 0.49 10*3/MM3 (ref 0.1–0.9)
MONOCYTES NFR BLD AUTO: 5.4 % (ref 5–12)
NEUTROPHILS NFR BLD AUTO: 5.68 10*3/MM3 (ref 1.7–7)
NEUTROPHILS NFR BLD AUTO: 62.1 % (ref 42.7–76)
NRBC BLD AUTO-RTO: 0 /100 WBC (ref 0–0.2)
PLATELET # BLD AUTO: 357 10*3/MM3 (ref 140–450)
PMV BLD AUTO: 11.4 FL (ref 6–12)
POTASSIUM SERPL-SCNC: 3.1 MMOL/L (ref 3.5–5.2)
PROT SERPL-MCNC: 7.5 G/DL (ref 6–8.5)
RBC # BLD AUTO: 5.21 10*6/MM3 (ref 3.77–5.28)
SODIUM SERPL-SCNC: 140 MMOL/L (ref 136–145)
TRIGL SERPL-MCNC: 155 MG/DL (ref 0–150)
TSH SERPL DL<=0.05 MIU/L-ACNC: 1.85 UIU/ML (ref 0.27–4.2)
VLDLC SERPL-MCNC: 27 MG/DL (ref 5–40)
WBC NRBC COR # BLD: 9.14 10*3/MM3 (ref 3.4–10.8)

## 2023-01-20 PROCEDURE — 83036 HEMOGLOBIN GLYCOSYLATED A1C: CPT | Performed by: NURSE PRACTITIONER

## 2023-01-20 PROCEDURE — 1170F FXNL STATUS ASSESSED: CPT | Performed by: NURSE PRACTITIONER

## 2023-01-20 PROCEDURE — 80053 COMPREHEN METABOLIC PANEL: CPT | Performed by: NURSE PRACTITIONER

## 2023-01-20 PROCEDURE — 82306 VITAMIN D 25 HYDROXY: CPT | Performed by: NURSE PRACTITIONER

## 2023-01-20 PROCEDURE — G0009 ADMIN PNEUMOCOCCAL VACCINE: HCPCS | Performed by: NURSE PRACTITIONER

## 2023-01-20 PROCEDURE — 84443 ASSAY THYROID STIM HORMONE: CPT | Performed by: NURSE PRACTITIONER

## 2023-01-20 PROCEDURE — 36415 COLL VENOUS BLD VENIPUNCTURE: CPT | Performed by: NURSE PRACTITIONER

## 2023-01-20 PROCEDURE — 90677 PCV20 VACCINE IM: CPT | Performed by: NURSE PRACTITIONER

## 2023-01-20 PROCEDURE — 80061 LIPID PANEL: CPT | Performed by: NURSE PRACTITIONER

## 2023-01-20 PROCEDURE — 1159F MED LIST DOCD IN RCRD: CPT | Performed by: NURSE PRACTITIONER

## 2023-01-20 PROCEDURE — 85025 COMPLETE CBC W/AUTO DIFF WBC: CPT | Performed by: NURSE PRACTITIONER

## 2023-01-20 PROCEDURE — 99214 OFFICE O/P EST MOD 30 MIN: CPT | Performed by: NURSE PRACTITIONER

## 2023-01-20 PROCEDURE — 1125F AMNT PAIN NOTED PAIN PRSNT: CPT | Performed by: NURSE PRACTITIONER

## 2023-01-20 PROCEDURE — G0439 PPPS, SUBSEQ VISIT: HCPCS | Performed by: NURSE PRACTITIONER

## 2023-01-20 RX ORDER — PRAVASTATIN SODIUM 10 MG
10 TABLET ORAL DAILY
Qty: 90 TABLET | Refills: 1 | Status: SHIPPED | OUTPATIENT
Start: 2023-01-20

## 2023-01-20 RX ORDER — MONTELUKAST SODIUM 10 MG/1
TABLET ORAL
COMMUNITY
Start: 2022-12-09 | End: 2023-01-20 | Stop reason: SDUPTHER

## 2023-01-20 RX ORDER — ERGOCALCIFEROL 1.25 MG/1
50000 CAPSULE ORAL
Qty: 13 CAPSULE | Refills: 1 | Status: SHIPPED | OUTPATIENT
Start: 2023-01-20

## 2023-01-20 RX ORDER — FEXOFENADINE HCL 180 MG/1
180 TABLET ORAL 2 TIMES DAILY
Qty: 180 TABLET | Refills: 2 | Status: SHIPPED | OUTPATIENT
Start: 2023-01-20

## 2023-01-20 RX ORDER — AZELASTINE 1 MG/ML
2 SPRAY, METERED NASAL 2 TIMES DAILY
Qty: 30 ML | Refills: 5 | Status: SHIPPED | OUTPATIENT
Start: 2023-01-20

## 2023-01-20 RX ORDER — SENNOSIDES 8.6 MG
1300 CAPSULE ORAL EVERY 8 HOURS PRN
Qty: 120 TABLET | Refills: 1 | Status: SHIPPED | OUTPATIENT
Start: 2023-01-20 | End: 2023-02-19

## 2023-01-20 RX ORDER — MONTELUKAST SODIUM 10 MG/1
10 TABLET ORAL NIGHTLY
Qty: 90 TABLET | Refills: 1 | Status: SHIPPED | OUTPATIENT
Start: 2023-01-20

## 2023-01-20 RX ORDER — NYSTATIN 100000 [USP'U]/G
POWDER TOPICAL 2 TIMES DAILY
Qty: 30 G | Refills: 0 | Status: SHIPPED | OUTPATIENT
Start: 2023-01-20

## 2023-01-20 RX ORDER — HYDROCHLOROTHIAZIDE 25 MG/1
25 TABLET ORAL DAILY
Qty: 90 TABLET | Refills: 1 | Status: SHIPPED | OUTPATIENT
Start: 2023-01-20 | End: 2023-04-20

## 2023-01-20 RX ORDER — ASPIRIN 81 MG/1
81 TABLET, CHEWABLE ORAL DAILY
Qty: 90 TABLET | Refills: 1 | Status: SHIPPED | OUTPATIENT
Start: 2023-01-20

## 2023-01-20 RX ORDER — ALBUTEROL SULFATE 90 UG/1
2 AEROSOL, METERED RESPIRATORY (INHALATION) EVERY 6 HOURS
Qty: 8.6 G | Refills: 1 | Status: SHIPPED | OUTPATIENT
Start: 2023-01-20

## 2023-01-20 RX ORDER — POTASSIUM CHLORIDE 20 MEQ/1
20 TABLET, EXTENDED RELEASE ORAL DAILY
Qty: 90 TABLET | Refills: 1 | Status: SHIPPED | OUTPATIENT
Start: 2023-01-20

## 2023-01-20 RX ORDER — FLUTICASONE PROPIONATE AND SALMETEROL XINAFOATE 230; 21 UG/1; UG/1
2 AEROSOL, METERED RESPIRATORY (INHALATION) 2 TIMES DAILY
Qty: 8 G | Refills: 1 | Status: SHIPPED | OUTPATIENT
Start: 2023-01-20 | End: 2023-04-20

## 2023-01-20 NOTE — PROGRESS NOTES
The ABCs of the Annual Wellness Visit  Subsequent Medicare Wellness Visit    Subjective    Gaby Reyes is a 77 y.o. female who presents for a Subsequent Medicare Wellness Visit.    The following portions of the patient's history were reviewed and   updated as appropriate: allergies, current medications, past family history, past medical history, past social history, past surgical history and problem list.    Compared to one year ago, the patient feels her physical   health is the same.    Compared to one year ago, the patient feels her mental   health is the same.    Recent Hospitalizations:  She was not admitted to the hospital during the last year.       Current Medical Providers:  Patient Care Team:  Ramiro Mccloud APRN as PCP - General (Nurse Practitioner)    Outpatient Medications Prior to Visit   Medication Sig Dispense Refill   • calcium carbonate-vitamin d 600-400 MG-UNIT per tablet Take 1 tablet by mouth 2 (Two) Times a Day. 180 tablet 1   • cyanocobalamin 1000 MCG/ML injection Inject 1 mL into the appropriate muscle as directed by prescriber Every 28 (Twenty-Eight) Days. 3 mL 3   • fluticasone (FLONASE) 50 MCG/ACT nasal spray 1 spray into the nostril(s) as directed by provider Daily. 1 mL 5   • albuterol sulfate  (90 Base) MCG/ACT inhaler Inhale 2 puffs Every 6 (Six) Hours. 8.6 g 1   • aspirin 81 MG chewable tablet Chew 1 tablet Daily. 90 tablet 1   • fexofenadine (ALLEGRA) 180 MG tablet Take 1 tablet by mouth 2 (Two) Times a Day. 180 tablet 2   • hydroCHLOROthiazide (HYDRODIURIL) 25 MG tablet Take 1 tablet by mouth Daily for 90 days. 90 tablet 1   • metFORMIN (GLUCOPHAGE) 500 MG tablet Take 1 tablet by mouth Daily. 90 tablet 1   • montelukast (SINGULAIR) 10 MG tablet      • nystatin (MYCOSTATIN) 244278 UNIT/GM powder Apply  topically to the appropriate area as directed 2 (Two) Times a Day. 30 g 0   • potassium chloride (K-DUR,KLOR-CON) 20 MEQ CR tablet Take 1 tablet by mouth Daily. 90  "tablet 1   • pravastatin (PRAVACHOL) 10 MG tablet Take 1 tablet by mouth Daily. 90 tablet 1   • vitamin D (ERGOCALCIFEROL) 1.25 MG (93583 UT) capsule capsule Take 1 capsule by mouth Every 7 (Seven) Days. 13 capsule 1   • Alcohol Swabs (Alcohol Prep) pads 1 each Daily As Needed (cleaning of skin) for up to 90 doses. 100 each 1   • fluticasone-salmeterol (Advair HFA) 230-21 MCG/ACT inhaler Inhale 2 puffs 2 (Two) Times a Day for 90 days. 8 g 1   • meloxicam (MOBIC) 7.5 MG tablet Take 1 tablet by mouth Daily. 90 tablet 1     No facility-administered medications prior to visit.       No opioid medication identified on active medication list. I have reviewed chart for other potential  high risk medication/s and harmful drug interactions in the elderly.          Aspirin is on active medication list. Aspirin use is indicated based on review of current medical condition/s. Pros and cons of this therapy have been discussed today. Benefits of this medication outweigh potential harm.  Patient has been encouraged to continue taking this medication.  .      Patient Active Problem List   Diagnosis   • Hyperlipemia   • Obesity     Advance Care Planning  Advance Directive is on file.  ACP discussion was held with the patient during this visit. Patient has an advance directive in EMR which is still valid.      Objective    Vitals:    01/20/23 1047   BP: 124/68   BP Location: Left arm   Patient Position: Sitting   Cuff Size: Adult   Pulse: 91   Temp: 97.5 °F (36.4 °C)   TempSrc: Temporal   SpO2: 99%   Weight: 94.8 kg (209 lb)   Height: 170.2 cm (67\")   PainSc:   1   PainLoc: Ear  Comment: Bilateral     Estimated body mass index is 32.73 kg/m² as calculated from the following:    Height as of this encounter: 170.2 cm (67\").    Weight as of this encounter: 94.8 kg (209 lb).    BMI is >= 30 and <35. (Class 1 Obesity). The following options were offered after discussion;: weight loss educational material (shared in after visit " summary)      Does the patient have evidence of cognitive impairment? No          HEALTH RISK ASSESSMENT    Smoking Status:  Social History     Tobacco Use   Smoking Status Never   Smokeless Tobacco Never     Alcohol Consumption:  Social History     Substance and Sexual Activity   Alcohol Use Not Currently     Fall Risk Screen:    CHELITA Fall Risk Assessment was completed, and patient is at LOW risk for falls.Assessment completed on:1/20/2023    Depression Screening:  PHQ-2/PHQ-9 Depression Screening 1/20/2023   Little Interest or Pleasure in Doing Things 0-->not at all   Feeling Down, Depressed or Hopeless 0-->not at all   PHQ-9: Brief Depression Severity Measure Score 0       Health Habits and Functional and Cognitive Screening:  Functional & Cognitive Status 1/20/2023   Do you have difficulty preparing food and eating? No   Do you have difficulty bathing yourself, getting dressed or grooming yourself? No   Do you have difficulty using the toilet? No   Do you have difficulty moving around from place to place? No   Do you have trouble with steps or getting out of a bed or a chair? No   Current Diet Unhealthy Diet   Dental Exam Not up to date   Eye Exam Up to date   Exercise (times per week) 1 times per week   Current Exercises Include House Cleaning   Do you need help using the phone?  No   Are you deaf or do you have serious difficulty hearing?  No   Do you need help with transportation? No   Do you need help shopping? No   Do you need help preparing meals?  No   Do you need help with housework?  No   Do you need help with laundry? No   Do you need help taking your medications? No   Do you need help managing money? No   Do you ever drive or ride in a car without wearing a seat belt? No   Have you felt unusual stress, anger or loneliness in the last month? Yes   Who do you live with? Spouse   If you need help, do you have trouble finding someone available to you? No   Have you been bothered in the last four weeks  by sexual problems? No   Do you have difficulty concentrating, remembering or making decisions? No       Age-appropriate Screening Schedule:  Refer to the list below for future screening recommendations based on patient's age, sex and/or medical conditions. Orders for these recommended tests are listed in the plan section. The patient has been provided with a written plan.    Health Maintenance   Topic Date Due   • DIABETIC FOOT EXAM  06/05/2021   • HEMOGLOBIN A1C  12/20/2022   • URINE MICROALBUMIN  12/20/2022   • TDAP/TD VACCINES (1 - Tdap) 12/31/2023 (Originally 12/10/1964)   • ZOSTER VACCINE (1 of 2) 12/31/2023 (Originally 12/10/1997)   • DIABETIC EYE EXAM  06/06/2023   • LIPID PANEL  06/20/2023   • DXA SCAN  06/01/2024   • INFLUENZA VACCINE  Completed                CMS Preventative Services Quick Reference  Risk Factors Identified During Encounter  Polypharmacy: Medication List reviewed  The above risks/problems have been discussed with the patient.  Pertinent information has been shared with the patient in the After Visit Summary.  An After Visit Summary and PPPS were made available to the patient.    Follow Up:   Next Medicare Wellness visit to be scheduled in 1 year.       Additional E&M Note during same encounter follows:  Patient has multiple medical problems which are significant and separately identifiable that require additional work above and beyond the Medicare Wellness Visit.      Chief Complaint  Medicare Wellness-subsequent, Follow-up (6M routine check in), and Med Management (meloxicam (MOBIC) 7.5 MG tablet)    Subjective        HPI  Gaby Reyes is also being seen today for  Diabetes  She presents for her follow-up diabetic visit. She has type 2 diabetes mellitus. Her disease course has been stable. Pertinent negatives for hypoglycemia include no headaches. Associated symptoms include fatigue. Pertinent negatives for diabetes include no polydipsia, no polyphagia, no polyuria and no visual change.  There are no hypoglycemic complications. Symptoms are stable. There are no diabetic complications. Risk factors for coronary artery disease include diabetes mellitus, dyslipidemia, obesity, hypertension and post-menopausal. Current diabetic treatment includes oral agent (monotherapy). She is compliant with treatment most of the time. She is following a generally healthy diet. When asked about meal planning, she reported none. An ACE inhibitor/angiotensin II receptor blocker is being taken. She does not see a podiatrist.Eye exam is not current.      Hyperlipidemia  This is a chronic problem. The current episode started more than 1 year ago. The problem is controlled. Exacerbating diseases include diabetes and obesity. Pertinent negatives include no shortness of breath. Current antihyperlipidemic treatment includes statins. The current treatment provides significant improvement of lipids. There are no compliance problems.  Risk factors for coronary artery disease include diabetes mellitus, dyslipidemia, hypertension, obesity and post-menopausal.      Hypertension  This is a chronic problem. The current episode started more than 1 year ago. The problem is controlled. Pertinent negatives include no anxiety, headaches, palpitations, peripheral edema or shortness of breath. Risk factors for coronary artery disease include obesity, post-menopausal state, diabetes mellitus and dyslipidemia. Past treatments include direct vasodilators. Current antihypertension treatment includes diuretics. The current treatment provides significant improvement. There are no compliance problems.       Vitamin D  This is a chronic problem.  Current episode has been for longer than 6 months.  Problem has gradually been improving since she has been on vitamin D supplements.  She denies any excessive fatigue, hair loss, skin changes due to the vitamin D deficiency.  She tries to eat a well-balanced diet.  She has been on the vitamin D weekly, no  "compliance problems.  Condition is stable.     Patient states she ran out of meloxicam about 4 days ago, and states that after she stopped taking the medicine she feels better.  She was having gastrointestinal issues, she is contributed to the meloxicam because the symptoms have stopped since she stopped the meloxicam.  She is currently taking Tylenol arthritis and it is controlling pain at this time.  I did discuss for patient to discontinue meloxicam, will continue with Tylenol arthritis at this time and she verbalized understanding.  Objective   Vital Signs:  /68 (BP Location: Left arm, Patient Position: Sitting, Cuff Size: Adult)   Pulse 91   Temp 97.5 °F (36.4 °C) (Temporal)   Ht 170.2 cm (67\")   Wt 94.8 kg (209 lb)   SpO2 99%   BMI 32.73 kg/m²     Physical Exam  Vitals reviewed.   Constitutional:       Appearance: Normal appearance. She is well-developed. She is obese.   HENT:      Head: Normocephalic and atraumatic.   Eyes:      Conjunctiva/sclera: Conjunctivae normal.      Pupils: Pupils are equal, round, and reactive to light.   Cardiovascular:      Rate and Rhythm: Normal rate and regular rhythm.      Heart sounds: No murmur heard.  Pulmonary:      Effort: Pulmonary effort is normal.      Breath sounds: Normal breath sounds. No wheezing or rhonchi.   Musculoskeletal:      Right lower leg: No edema.      Left lower leg: No edema.   Skin:     General: Skin is warm and dry.   Neurological:      Mental Status: She is alert and oriented to person, place, and time.   Psychiatric:         Mood and Affect: Mood and affect normal.         Behavior: Behavior normal.         Thought Content: Thought content normal.         Judgment: Judgment normal.          The following data was reviewed by: CALISTA Mejia on 01/20/2023:  Common labs    Common Labs 6/20/22 6/20/22 6/20/22 6/20/22    1510 1510 1510 1510   Glucose   85    BUN   14    Creatinine   0.70    Sodium   137    Potassium   3.8  "   Chloride   100    Calcium   9.8    Albumin   4.20    Total Bilirubin   0.6    Alkaline Phosphatase   81    AST (SGOT)   26    ALT (SGPT)   20    WBC  7.99     Hemoglobin  15.3     Hematocrit  45.3     Platelets  362     Total Cholesterol    152   Triglycerides    151 (A)   HDL Cholesterol    39 (A)   LDL Cholesterol     87   Hemoglobin A1C 5.40      (A) Abnormal value                       Assessment and Plan   Diagnoses and all orders for this visit:    1. Encounter for subsequent annual wellness visit (AWV) in Medicare patient (Primary)    2. Need for flu vaccination  -     Pneumococcal Conjugate Vaccine 20-Valent All    3. Type 2 diabetes mellitus without complication, without long-term current use of insulin (HCC)  Comments:  We will recheck labs, adjust medication if needed patient verbalized understanding.  Orders:  -     CBC Auto Differential  -     Comprehensive Metabolic Panel  -     Hemoglobin A1c  -     Lipid Panel  -     MicroAlbumin, Urine, Random - Urine, Clean Catch  -     TSH  -     Ambulatory Referral to Podiatry    4. Essential hypertension  Comments:  Blood pressure stable at 124/68 we will continue on current medication regimen  Orders:  -     CBC Auto Differential  -     Comprehensive Metabolic Panel    5. Mixed hyperlipidemia  Comments:  Check labs, adjust medication if needed  Orders:  -     CBC Auto Differential  -     Comprehensive Metabolic Panel    6. Vitamin D deficiency  -     Vitamin D,25-Hydroxy    7. Osteoarthritis, unspecified osteoarthritis type, unspecified site  Comments:  We will stop meloxicam, continue on Tylenol arthritis, continue to monitor patient's symptoms, patient verbalized understanding.    Other orders  -     albuterol sulfate  (90 Base) MCG/ACT inhaler; Inhale 2 puffs Every 6 (Six) Hours.  Dispense: 8.6 g; Refill: 1  -     aspirin 81 MG chewable tablet; Chew 1 tablet Daily.  Dispense: 90 tablet; Refill: 1  -     fexofenadine (ALLEGRA) 180 MG tablet; Take 1  tablet by mouth 2 (Two) Times a Day.  Dispense: 180 tablet; Refill: 2  -     fluticasone-salmeterol (Advair HFA) 230-21 MCG/ACT inhaler; Inhale 2 puffs 2 (Two) Times a Day for 90 days.  Dispense: 8 g; Refill: 1  -     hydroCHLOROthiazide (HYDRODIURIL) 25 MG tablet; Take 1 tablet by mouth Daily for 90 days.  Dispense: 90 tablet; Refill: 1  -     metFORMIN (GLUCOPHAGE) 500 MG tablet; Take 1 tablet by mouth Daily.  Dispense: 90 tablet; Refill: 1  -     montelukast (SINGULAIR) 10 MG tablet; Take 1 tablet by mouth Every Night.  Dispense: 90 tablet; Refill: 1  -     nystatin (MYCOSTATIN) 182038 UNIT/GM powder; Apply  topically to the appropriate area as directed 2 (Two) Times a Day.  Dispense: 30 g; Refill: 0  -     potassium chloride (K-DUR,KLOR-CON) 20 MEQ CR tablet; Take 1 tablet by mouth Daily.  Dispense: 90 tablet; Refill: 1  -     pravastatin (PRAVACHOL) 10 MG tablet; Take 1 tablet by mouth Daily.  Dispense: 90 tablet; Refill: 1  -     vitamin D (ERGOCALCIFEROL) 1.25 MG (99029 UT) capsule capsule; Take 1 capsule by mouth Every 7 (Seven) Days.  Dispense: 13 capsule; Refill: 1  -     acetaminophen (Tylenol 8 Hour Arthritis Pain) 650 MG 8 hr tablet; Take 2 tablets by mouth Every 8 (Eight) Hours As Needed for Moderate Pain for up to 30 days.  Dispense: 120 tablet; Refill: 1  -     azelastine (ASTELIN) 0.1 % nasal spray; 2 sprays into the nostril(s) as directed by provider 2 (Two) Times a Day. Use in each nostril as directed  Dispense: 30 mL; Refill: 5             Follow Up   Return in about 6 months (around 7/20/2023) for Recheck.  Patient was given instructions and counseling regarding her condition or for health maintenance advice. Please see specific information pulled into the AVS if appropriate.

## 2023-01-21 LAB — 25(OH)D3 SERPL-MCNC: 57 NG/ML (ref 30–100)

## 2023-01-23 ENCOUNTER — LAB (OUTPATIENT)
Dept: FAMILY MEDICINE CLINIC | Facility: CLINIC | Age: 78
End: 2023-01-23
Payer: MEDICARE

## 2023-01-23 DIAGNOSIS — E87.6 HYPOKALEMIA: Primary | ICD-10-CM

## 2023-01-23 LAB — ALBUMIN UR-MCNC: <1.2 MG/DL

## 2023-01-23 PROCEDURE — 82043 UR ALBUMIN QUANTITATIVE: CPT | Performed by: NURSE PRACTITIONER

## 2023-01-27 ENCOUNTER — LAB (OUTPATIENT)
Dept: FAMILY MEDICINE CLINIC | Facility: CLINIC | Age: 78
End: 2023-01-27
Payer: MEDICARE

## 2023-01-27 DIAGNOSIS — E87.6 HYPOKALEMIA: ICD-10-CM

## 2023-01-27 LAB — POTASSIUM SERPL-SCNC: 3.6 MMOL/L (ref 3.5–5.2)

## 2023-01-27 PROCEDURE — 84132 ASSAY OF SERUM POTASSIUM: CPT | Performed by: NURSE PRACTITIONER

## 2023-04-11 ENCOUNTER — OFFICE VISIT (OUTPATIENT)
Dept: PODIATRY | Facility: CLINIC | Age: 78
End: 2023-04-11
Payer: MEDICARE

## 2023-04-11 VITALS
TEMPERATURE: 98 F | HEIGHT: 67 IN | OXYGEN SATURATION: 94 % | BODY MASS INDEX: 32.65 KG/M2 | DIASTOLIC BLOOD PRESSURE: 74 MMHG | WEIGHT: 208 LBS | HEART RATE: 78 BPM | SYSTOLIC BLOOD PRESSURE: 114 MMHG

## 2023-04-11 DIAGNOSIS — E11.8 DIABETIC FOOT: ICD-10-CM

## 2023-04-11 DIAGNOSIS — E11.9 NON-INSULIN DEPENDENT TYPE 2 DIABETES MELLITUS: Primary | ICD-10-CM

## 2023-04-11 PROCEDURE — 1159F MED LIST DOCD IN RCRD: CPT | Performed by: PODIATRIST

## 2023-04-11 PROCEDURE — 99203 OFFICE O/P NEW LOW 30 MIN: CPT | Performed by: PODIATRIST

## 2023-04-11 PROCEDURE — G8404 LOW EXTEMITY NEUR EXAM DOCUM: HCPCS | Performed by: PODIATRIST

## 2023-04-11 PROCEDURE — 1160F RVW MEDS BY RX/DR IN RCRD: CPT | Performed by: PODIATRIST

## 2023-04-11 NOTE — PROGRESS NOTES
[facility] - PODIATRY    Today's Date: 04/11/23    Patient Name: Gaby Reyes  MRN: [id number]  CSN: [id number]  PCP: Ramiro Mccloud APRN, Last PCP Visit:  1/27/2023  Referring Provider: Ramiro Mccloud, *    SUBJECTIVE     Chief Complaint   Patient presents with   • Left Foot - Establish Care, Annual Exam, Diabetes   • Right Foot - Annual Exam, Diabetes, Establish Care     HPI: Gaby Reyes, a 77 y.o.female, presents to clinic for a diabetic foot evaluation.    New, Established, New Problem:  new    Onset: Insidious    Nature: NIDDM    Stable, worsening, improving: Stable    Patient controlling diabetes via: Oral medication    Not required to check blood sugars at home.    Patient denies any fevers, chills, nausea, vomiting, shortness of breath, nor any other constitutional signs nor symptoms.    No other pedal complaints at this time.    Past Medical History:   Diagnosis Date   • Allergic rhinitis 10/30/2014   • Arthritis    • Asthma     STOPPED IN 2003   • Bronchitis    • Chronic pain of right knee 01/29/2015   • Deafness    • Gall stones 1997   • High blood pressure    • High cholesterol    • Hyperlipemia    • Hyperlipidemia 02/08/2018   • Hypertension    • Impaired fasting glucose 04/28/2014   • Osteopenia 11/2015    L1 LUMBAR SPINE   • Pneumonia    • Pressure ulcer, stage 1    • Reflux esophagitis    • Seasonal allergies    • Sinus trouble    • Type 2 diabetes mellitus without complication    • Vertigo      Past Surgical History:   Procedure Laterality Date   • COLONOSCOPY     • GALLBLADDER SURGERY  1997   • HYSTERECTOMY  1983   • TONSILLECTOMY       Family History   Problem Relation Age of Onset   • Cancer Mother    • Diabetes type II Mother    • Kidney cancer Mother    • Heart disease Father 59        MI MASSIVE    • Arthritis Father    • Heart disease Brother 50   • Diabetes type II Brother      Social History     Socioeconomic History   • Marital status:    Tobacco Use    • Smoking status: Never   • Smokeless tobacco: Never   Vaping Use   • Vaping Use: Never used   Substance and Sexual Activity   • Alcohol use: Not Currently   • Drug use: Never   • Sexual activity: Defer     No Known Allergies  Current Outpatient Medications   Medication Sig Dispense Refill   • albuterol sulfate  (90 Base) MCG/ACT inhaler Inhale 2 puffs Every 6 (Six) Hours. 8.6 g 1   • Alcohol Swabs (Alcohol Prep) pads 1 each Daily As Needed (cleaning of skin) for up to 90 doses. 100 each 1   • aspirin 81 MG chewable tablet Chew 1 tablet Daily. 90 tablet 1   • azelastine (ASTELIN) 0.1 % nasal spray 2 sprays into the nostril(s) as directed by provider 2 (Two) Times a Day. Use in each nostril as directed 30 mL 5   • calcium carbonate-vitamin d 600-400 MG-UNIT per tablet Take 1 tablet by mouth 2 (Two) Times a Day. 180 tablet 1   • cyanocobalamin 1000 MCG/ML injection Inject 1 mL into the appropriate muscle as directed by prescriber Every 28 (Twenty-Eight) Days. 3 mL 3   • fexofenadine (ALLEGRA) 180 MG tablet Take 1 tablet by mouth 2 (Two) Times a Day. 180 tablet 2   • fluticasone (FLONASE) 50 MCG/ACT nasal spray 1 spray into the nostril(s) as directed by provider Daily. 1 mL 5   • fluticasone-salmeterol (Advair HFA) 230-21 MCG/ACT inhaler Inhale 2 puffs 2 (Two) Times a Day for 90 days. 8 g 1   • hydroCHLOROthiazide (HYDRODIURIL) 25 MG tablet Take 1 tablet by mouth Daily for 90 days. 90 tablet 1   • metFORMIN (GLUCOPHAGE) 500 MG tablet Take 1 tablet by mouth Daily. 90 tablet 1   • montelukast (SINGULAIR) 10 MG tablet Take 1 tablet by mouth Every Night. 90 tablet 1   • nystatin (MYCOSTATIN) 529653 UNIT/GM powder Apply  topically to the appropriate area as directed 2 (Two) Times a Day. 30 g 0   • potassium chloride (K-DUR,KLOR-CON) 20 MEQ CR tablet Take 1 tablet by mouth Daily. 90 tablet 1   • pravastatin (PRAVACHOL) 10 MG tablet Take 1 tablet by mouth Daily. 90 tablet 1   • vitamin D (ERGOCALCIFEROL) 1.25 MG  (85780 UT) capsule capsule Take 1 capsule by mouth Every 7 (Seven) Days. 13 capsule 1     No current facility-administered medications for this visit.     Review of Systems   Constitutional: Negative.    All other systems reviewed and are negative.      OBJECTIVE     Vitals:    04/11/23 1256   BP: 114/74   Pulse: 78   Temp: 98 °F (36.7 °C)   SpO2: 94%       Body mass index is 32.57 kg/m².    Lab Results   Component Value Date    HGBA1C 5.60 01/20/2023       Lab Results   Component Value Date    GLUCOSE 95 01/20/2023    CALCIUM 10.0 01/20/2023     01/20/2023    K 3.6 01/27/2023    CO2 28.0 01/20/2023    CL 99 01/20/2023    BUN 25 (H) 01/20/2023    CREATININE 0.75 01/20/2023    EGFRIFNONA 96 12/20/2021    BCR 33.3 (H) 01/20/2023    ANIONGAP 13.0 01/20/2023       Patient seen in no apparent distress.      PHYSICAL EXAM:     Foot/Ankle Exam    GENERAL  Diabetic foot exam performed    Appearance:  obese and elderly  Orientation:  AAOx3  Affect:  appropriate  Gait:  unimpaired  Assistance:  independent  Right shoe gear: casual shoe  Left shoe gear: casual shoe    VASCULAR     Right Foot Vascularity   Dorsalis pedis:  2+  Posterior tibial:  2+  Skin temperature:  warm  Edema grading:  None  CFT:  < 3 seconds  Pedal hair growth:  Absent  Varicosities:  moderate varicosities     Left Foot Vascularity   Dorsalis pedis:  2+  Posterior tibial:  2+  Skin temperature:  warm  Edema grading:  None  CFT:  < 3 seconds  Pedal hair growth:  Absent  Varicosities:  moderate varicosities     NEUROLOGIC     Right Foot Neurologic   Normal sensation    Light touch sensation: normal  Vibratory sensation: normal  Hot/Cold sensation: normal     Left Foot Neurologic   Normal sensation    Light touch sensation: normal  Vibratory sensation: normal  Hot/Cold sensation:  normal    MUSCLE STRENGTH     Right Foot Muscle Strength   Foot dorsiflexion:  4  Foot plantar flexion:  4  Foot inversion:  4  Foot eversion:  4     Left Foot Muscle Strength    Foot dorsiflexion:  4  Foot plantar flexion:  4  Foot inversion:  4  Foot eversion:  4    RANGE OF MOTION     Right Foot Range of Motion   Foot and ankle ROM within normal limits       Left Foot Range of Motion   Foot and ankle ROM within normal limits      DERMATOLOGIC      Right Foot Dermatologic   Skin  Right foot skin is intact.      Left Foot Dermatologic   Skin  Left foot skin is intact.       Diabetic Foot Exam Performed      ASSESSMENT/PLAN     Diagnoses and all orders for this visit:    1. Non-insulin dependent type 2 diabetes mellitus (Primary)    2. Diabetic foot        Comprehensive lower extremity examination and evaluation was performed.    Discussed findings and treatment plan including risks, benefits, and treatment options with patient in detail. Patient agreed with treatment plan.    Medications and allergies reviewed.  Reviewed available blood glucose and HgB A1C lab values along with other pertinent labs.  These were discussed with the patient as to their importance of diabetic maintenance.    Diabetic foot exam performed and documented this date, compliant with CQM required standards. Detail of findings as noted in physical exam.  Lower extremity Neurologic exam for diabetic patient performed and documented this date, compliant with PQRS required standards. Detail of findings as noted in physical exam.  Advised patient importance of good routine lower extremity hygiene. Advised patient importance of evaluating for intact skin and pain free nail borders.  Advised patient to use mirror to evaluate plantar/ soles of feet for better visualization. Advised patient monitor and phone office to be seen if any cracking to skin, open lesions, painful nail borders or if nails become elongated prior to next visit. Advised patient importance of daily cleansing of lower extremities, followed by good skin cream to maintain normal hydration of skin. Also advised patient importance of close daily monitoring of  blood sugar. Advised to regulate diet and medications to maintain control of blood sugar in optimal range. Contact primary care provider if difficulties maintaining blood sugar levels.  Advised Patient of presence of Diabetes Mellitus condition.  Advised Patient risk of progression and worsening or improvement, then return of condition.  Will monitor condition for any change in future. Treat with most appropriate treatment pending status of condition.  Counseled and advised patient extensively on nature and ramifications of diabetes. Standard instructions given to patient for good diabetic foot care and maintenance. Advised importance of careful monitoring to avoid break down and complications secondary to diabetes. Advised patient importance of strict maintenance of blood sugar control. Advised patient of possible ominous results from neglect of condition, i.e.: amputation/ loss of digits, feet and legs, or even death.  Patient states understands counseling, will monitor closely, continue good hygiene and routine diabetic foot care. Patient will contact office is questions or problems.      An After Visit Summary was printed and given to the patient at discharge, including (if requested) any available informative/educational handouts regarding diagnosis, treatment, or medications. All questions were answered to patient/family satisfaction. Should symptoms fail to improve or worsen they agree to call or return to clinic or to go to the Emergency Department. Discussed the importance of following up with any needed screening tests/labs/specialist appointments and any requested follow-up recommended by me today. Importance of maintaining follow-up discussed and patient accepts that missed appointments can delay diagnosis and potentially lead to worsening of conditions.    Return in about 1 year (around 4/11/2024) for Podiatry Diabetic Foot Exam., or sooner if acute issues arise.    This document has been electronically  signed by Toni Alaniz DPM on April 11, 2023 13:20 EDT

## 2023-07-06 ENCOUNTER — TELEPHONE (OUTPATIENT)
Dept: FAMILY MEDICINE CLINIC | Facility: CLINIC | Age: 78
End: 2023-07-06

## 2023-07-06 RX ORDER — HYDROCHLOROTHIAZIDE 25 MG/1
25 TABLET ORAL DAILY
Qty: 90 TABLET | Refills: 1 | Status: CANCELLED | OUTPATIENT
Start: 2023-07-06 | End: 2024-01-02

## 2023-07-06 NOTE — TELEPHONE ENCOUNTER
Caller: Gaby Reyes    Relationship: Self    Best call back number:     [phone]       Requested Prescriptions:   Requested Prescriptions     Pending Prescriptions Disp Refills    metFORMIN (GLUCOPHAGE) 500 MG tablet 90 tablet 1     Sig: Take 1 tablet by mouth Daily.    hydroCHLOROthiazide (HYDRODIURIL) 25 MG tablet 90 tablet 1     Sig: Take 1 tablet by mouth Daily for 180 days.        Pharmacy where request should be sent: [facility] - [address] - [phone] [facility] [phone]      Last office visit with prescribing clinician: 6/28/2023   Last telemedicine visit with prescribing clinician: Visit date not found   Next office visit with prescribing clinician: 12/28/2023     Additional details provided by patient: PATIENT IS COMPLETELY OUT OF MEDICATION    Does the patient have less than a 3 day supply:  [x] Yes  [] No    Would you like a call back once the refill request has been completed: [] Yes [x] No    If the office needs to give you a call back, can they leave a voicemail: [] Yes [x] No    Marcelo Sullivan Rep   07/06/23 11:56 EDT

## 2023-10-02 ENCOUNTER — OFFICE VISIT (OUTPATIENT)
Dept: FAMILY MEDICINE CLINIC | Facility: CLINIC | Age: 78
End: 2023-10-02
Payer: MEDICARE

## 2023-10-02 VITALS
HEART RATE: 82 BPM | SYSTOLIC BLOOD PRESSURE: 120 MMHG | DIASTOLIC BLOOD PRESSURE: 64 MMHG | TEMPERATURE: 97.5 F | OXYGEN SATURATION: 95 % | HEIGHT: 67 IN | BODY MASS INDEX: 32.02 KG/M2 | WEIGHT: 204 LBS

## 2023-10-02 DIAGNOSIS — J06.9 VIRAL UPPER RESPIRATORY TRACT INFECTION: ICD-10-CM

## 2023-10-02 DIAGNOSIS — R68.89 FLU-LIKE SYMPTOMS: Primary | ICD-10-CM

## 2023-10-02 LAB
EXPIRATION DATE: NORMAL
INTERNAL CONTROL: NORMAL
Lab: NORMAL
SARS-COV-2 AG UPPER RESP QL IA.RAPID: NOT DETECTED

## 2023-10-02 PROCEDURE — 3044F HG A1C LEVEL LT 7.0%: CPT | Performed by: NURSE PRACTITIONER

## 2023-10-02 PROCEDURE — 1159F MED LIST DOCD IN RCRD: CPT | Performed by: NURSE PRACTITIONER

## 2023-10-02 PROCEDURE — 99214 OFFICE O/P EST MOD 30 MIN: CPT | Performed by: NURSE PRACTITIONER

## 2023-10-02 PROCEDURE — 87426 SARSCOV CORONAVIRUS AG IA: CPT | Performed by: NURSE PRACTITIONER

## 2023-10-02 PROCEDURE — 1160F RVW MEDS BY RX/DR IN RCRD: CPT | Performed by: NURSE PRACTITIONER

## 2023-10-02 RX ORDER — DEXTROMETHORPHAN HYDROBROMIDE AND PROMETHAZINE HYDROCHLORIDE 15; 6.25 MG/5ML; MG/5ML
5 SYRUP ORAL 4 TIMES DAILY PRN
Qty: 250 ML | Refills: 0 | Status: SHIPPED | OUTPATIENT
Start: 2023-10-02

## 2023-10-02 RX ORDER — EPINEPHRINE 0.3 MG/.3ML
INJECTION SUBCUTANEOUS
COMMUNITY
Start: 2023-09-21

## 2023-10-02 RX ORDER — GUAIFENESIN 200 MG/1
400 TABLET ORAL EVERY 4 HOURS PRN
Qty: 60 TABLET | Refills: 1 | Status: SHIPPED | OUTPATIENT
Start: 2023-10-02

## 2023-10-02 RX ORDER — DOXYCYCLINE HYCLATE 100 MG/1
100 CAPSULE ORAL 2 TIMES DAILY
Qty: 20 CAPSULE | Refills: 0 | Status: SHIPPED | OUTPATIENT
Start: 2023-10-02

## 2023-10-02 NOTE — PROGRESS NOTES
Chief Complaint  Cough (Dry cough), Nasal Congestion (Yellow mucus), Sinus Pressure, and Urgent Care Visit ([facility] on 10/01/2023, pt reports testing Neg for Influenza )    Subjective        Medical History: has a past medical history of Allergic rhinitis (10/30/2014), Arthritis, Asthma, Bronchitis, Chronic pain of right knee (01/29/2015), Deafness, Gall stones (1997), High blood pressure, High cholesterol, Hyperlipemia, Hyperlipidemia (02/08/2018), Hypertension, Impaired fasting glucose (04/28/2014), Osteopenia (11/2015), Pneumonia, Pressure ulcer, stage 1, Reflux esophagitis, Seasonal allergies, Sinus trouble, Type 2 diabetes mellitus without complication, and Vertigo.     Surgical History: has a past surgical history that includes Colonoscopy; Gallbladder surgery (1997); Hysterectomy (1983); and Tonsillectomy.     Family History: family history includes Arthritis in her father; Cancer in her mother; Diabetes type II in her brother and mother; Heart disease (age of onset: 50) in her brother; Heart disease (age of onset: 59) in her father; Kidney cancer in her mother.     Social History: reports that she has never smoked. She has never been exposed to tobacco smoke. She has never used smokeless tobacco. She reports that she does not currently use alcohol. She reports that she does not use drugs.    Gaby Reyes presents to [facility]  Cough  This is a new problem. The current episode started in the past 7 days. The problem has been waxing and waning. The problem occurs every few minutes. The cough is Non-productive. Associated symptoms include nasal congestion and rhinorrhea. Pertinent negatives include no chills or fever. Nothing aggravates the symptoms. She has tried nothing for the symptoms. The treatment provided no relief.   Patient states symptoms started about 2 days ago she has been in the hospital with her  who is currently hospitalized, she was afraid she  "had picked up something while staying at the hospital she went to the [facility] clinic yesterday she was negative for flu and COVID, and was discharged home.  Patient states she continues to have cough and congestion.  Patient does have underlying lung disease when she does use Xopenex nebulizers and albuterol as needed.  Patient states overall she just does not feel well, eating and drinking normally, denies any fever but has some chills      Objective   Vital Signs:   /64 (BP Location: Left arm, Patient Position: Sitting, Cuff Size: Adult)   Pulse 82   Temp 97.5 °F (36.4 °C) (Temporal)   Ht 170.2 cm (67\")   Wt 92.5 kg (204 lb)   SpO2 95%   BMI 31.95 kg/m²       Wt Readings from Last 3 Encounters:   10/02/23 92.5 kg (204 lb)   06/28/23 93.4 kg (206 lb)   04/11/23 94.3 kg (208 lb)        BP Readings from Last 3 Encounters:   10/02/23 120/64   06/28/23 110/64   04/11/23 114/74      Physical Exam  Vitals reviewed.   Constitutional:       Appearance: Normal appearance. She is well-developed.   HENT:      Head: Normocephalic and atraumatic.   Eyes:      Conjunctiva/sclera: Conjunctivae normal.      Pupils: Pupils are equal, round, and reactive to light.   Cardiovascular:      Rate and Rhythm: Normal rate and regular rhythm.      Heart sounds: No murmur heard.  Pulmonary:      Effort: Pulmonary effort is normal.      Breath sounds: Normal breath sounds. No wheezing or rhonchi.   Skin:     General: Skin is warm and dry.   Neurological:      Mental Status: She is alert and oriented to person, place, and time.   Psychiatric:         Mood and Affect: Mood and affect normal.         Behavior: Behavior normal.         Thought Content: Thought content normal.         Judgment: Judgment normal.      Result Review :  {The following data was reviewed by CALISTA Mejia on 10/02/2023.  Common labs          1/23/2023    13:10 1/27/2023    12:30 6/28/2023    13:43   Common Labs   Glucose   169    BUN   17  "   Creatinine   0.75    Sodium   140    Potassium  3.6  3.0    Chloride   102    Calcium   9.7    Albumin   4.3    Total Bilirubin   0.5    Alkaline Phosphatase   70    AST (SGOT)   18    ALT (SGPT)   11    WBC   11.90    Hemoglobin   14.8    Hematocrit   43.2    Platelets   367    Total Cholesterol   153    Triglycerides   179    HDL Cholesterol   38    LDL Cholesterol    84    Hemoglobin A1C   5.60    Microalbumin, Urine <1.2                POCT SUNDAY SARS-CoV-2 Antigen TRU  Order: [id number]  Status: Final result       Visible to patient: Yes (not seen)       Dx: Flu-like symptoms    Specimen Information: Swab   0 Result Notes      Component  Ref Range & Units 3 d ago   SARS Antigen  Not Detected, Presumptive Negative Not Detected   Internal Control  Passed Passed   Lot Number [id number]   Expiration Date 04/02/2024          Current Outpatient Medications on File Prior to Visit   Medication Sig Dispense Refill    albuterol sulfate  (90 Base) MCG/ACT inhaler Inhale 2 puffs Every 6 (Six) Hours. 8.6 g 1    aspirin 81 MG chewable tablet Chew 1 tablet Daily. 90 tablet 1    azelastine (ASTELIN) 0.1 % nasal spray 2 sprays into the nostril(s) as directed by provider 2 (Two) Times a Day. Use in each nostril as directed 30 mL 5    calcium carbonate-vitamin d 600-400 MG-UNIT per tablet Take 1 tablet by mouth 2 (Two) Times a Day. 180 tablet 1    EPINEPHrine (EPIPEN) 0.3 MG/0.3ML solution auto-injector injection       fexofenadine (ALLEGRA) 180 MG tablet Take 1 tablet by mouth Daily. 90 tablet 2    fluticasone (FLONASE) 50 MCG/ACT nasal spray 1 spray into the nostril(s) as directed by provider Daily. 1 mL 5    hydroCHLOROthiazide (HYDRODIURIL) 25 MG tablet Take 1 tablet by mouth Daily for 180 days. 90 tablet 1    metFORMIN (GLUCOPHAGE) 500 MG tablet Take 1 tablet by mouth Daily. 90 tablet 1    montelukast (SINGULAIR) 10 MG tablet Take 1 tablet by mouth Every Night. 90 tablet 1    nystatin (MYCOSTATIN) 514114 UNIT/GM  powder Apply  topically to the appropriate area as directed 2 (Two) Times a Day. 30 g 0    potassium chloride (K-DUR,KLOR-CON) 20 MEQ CR tablet Take 1 tablet by mouth 2 (Two) Times a Day. 180 tablet 1    pravastatin (PRAVACHOL) 10 MG tablet Take 1 tablet by mouth Daily. 90 tablet 1    vitamin D (ERGOCALCIFEROL) 1.25 MG (07114 UT) capsule capsule Take 1 capsule by mouth Every 7 (Seven) Days. 13 capsule 1    fluticasone-salmeterol (Advair HFA) 230-21 MCG/ACT inhaler Inhale 2 puffs 2 (Two) Times a Day for 90 days. 8 g 1    levalbuterol (XOPENEX HFA) 45 MCG/ACT inhaler Inhale 1-2 puffs Every 4 (Four) Hours As Needed for Wheezing. 15 g 11     No current facility-administered medications on file prior to visit.        Assessment and Plan  Diagnoses and all orders for this visit:    1. Flu-like symptoms (Primary)  -     POCT SUNDAY SARS-CoV-2 Antigen TRU    2. Viral upper respiratory tract infection  Comments:  We will treat with doxy, Mucinex, Promethazine DM.  Did discuss strict return precautions including worsening in shortness of breath.Pt verbalized understanding    Other orders  -     promethazine-dextromethorphan (PROMETHAZINE-DM) 6.25-15 MG/5ML syrup; Take 5 mL by mouth 4 (Four) Times a Day As Needed for Cough.  Dispense: 250 mL; Refill: 0  -     doxycycline (VIBRAMYCIN) 100 MG capsule; Take 1 capsule by mouth 2 (Two) Times a Day.  Dispense: 20 capsule; Refill: 0  -     guaiFENesin 200 MG tablet; Take 2 tablets by mouth Every 4 (Four) Hours As Needed for Cough or Congestion.  Dispense: 60 tablet; Refill: 1        Follow Up   Return for If symptoms do not improve new concerning symptoms.  Patient was given instructions and counseling regarding her condition or for health maintenance advice. Please see specific information pulled into the AVS if appropriate.       Part of this note may be electronic transcription/translation of spoken language to printed text using the Dragon dictation system

## 2023-11-04 ENCOUNTER — LAB (OUTPATIENT)
Dept: LAB | Facility: HOSPITAL | Age: 78
End: 2023-11-04
Payer: MEDICARE

## 2023-11-04 DIAGNOSIS — E87.6 HYPOKALEMIA: ICD-10-CM

## 2023-11-04 LAB
ANION GAP SERPL CALCULATED.3IONS-SCNC: 12.2 MMOL/L (ref 5–15)
BUN SERPL-MCNC: 14 MG/DL (ref 8–23)
BUN/CREAT SERPL: 24.1 (ref 7–25)
CALCIUM SPEC-SCNC: 9.5 MG/DL (ref 8.6–10.5)
CHLORIDE SERPL-SCNC: 101 MMOL/L (ref 98–107)
CO2 SERPL-SCNC: 25.8 MMOL/L (ref 22–29)
CREAT SERPL-MCNC: 0.58 MG/DL (ref 0.57–1)
EGFRCR SERPLBLD CKD-EPI 2021: 94.5 ML/MIN/1.73
GLUCOSE SERPL-MCNC: 116 MG/DL (ref 65–99)
POTASSIUM SERPL-SCNC: 3.6 MMOL/L (ref 3.5–5.2)
SODIUM SERPL-SCNC: 139 MMOL/L (ref 136–145)

## 2023-11-04 PROCEDURE — 80048 BASIC METABOLIC PNL TOTAL CA: CPT

## 2023-11-04 PROCEDURE — 36415 COLL VENOUS BLD VENIPUNCTURE: CPT

## 2023-11-14 ENCOUNTER — OFFICE VISIT (OUTPATIENT)
Dept: CARDIOLOGY | Facility: CLINIC | Age: 78
End: 2023-11-14
Payer: MEDICARE

## 2023-11-14 VITALS
BODY MASS INDEX: 31.71 KG/M2 | DIASTOLIC BLOOD PRESSURE: 76 MMHG | HEIGHT: 67 IN | SYSTOLIC BLOOD PRESSURE: 134 MMHG | HEART RATE: 84 BPM | WEIGHT: 202 LBS

## 2023-11-14 DIAGNOSIS — I10 HYPERTENSION, ESSENTIAL: ICD-10-CM

## 2023-11-14 DIAGNOSIS — R94.31 ABNORMAL EKG: Primary | ICD-10-CM

## 2023-11-14 DIAGNOSIS — E78.2 HYPERLIPEMIA, MIXED: ICD-10-CM

## 2023-11-14 DIAGNOSIS — R06.02 SHORTNESS OF BREATH: ICD-10-CM

## 2023-11-14 PROCEDURE — 1160F RVW MEDS BY RX/DR IN RCRD: CPT | Performed by: SPECIALIST

## 2023-11-14 PROCEDURE — 1159F MED LIST DOCD IN RCRD: CPT | Performed by: SPECIALIST

## 2023-11-14 PROCEDURE — 99204 OFFICE O/P NEW MOD 45 MIN: CPT | Performed by: SPECIALIST

## 2023-11-14 NOTE — PROGRESS NOTES
[facility]   Cardiology Consult Note    Patient Name: Gaby Reyes  : 1945  Referring Physician: AGA Mejia  Subjective   Subjective     Reason for Consult/ Chief Complaint:   Chief Complaint   Patient presents with    Abnormal ECG       HPI:  Gaby Reyes is a 77 y.o. female with history of hypertension and diabetes mellitus noticed to have an abnormal EKG.  Denies any chest pain.  Has some shortness of breath on exertion.  No palpitations.    Review of Systems:    Co and diabetes mellitus noticed to have an abnormal EKG denies any chest pain.  No rashnstitutional no fever,  no weight loss   Skin no rash   Otolaryngeal no difficulty swallowing   Cardiovascular See HPI   Pulmonary no cough, no sputum production   Gastrointestinal no constipation, no diarrhea   Genitourinary no dysuria, no hematuria   Hematologic no easy bruisability, no abnormal bleeding   Musculoskeletal no muscle pain   Neurologic no dizziness, no falls       Personal History     Past Medical History:  Past Medical History:   Diagnosis Date    Allergic rhinitis 10/30/2014    Arthritis     Asthma     STOPPED IN     Bronchitis     Chronic pain of right knee 2015    Deafness     Gall stones     High blood pressure     High cholesterol     Hyperlipemia     Hyperlipidemia 2018    Hypertension     Impaired fasting glucose 2014    Osteopenia 2015    L1 LUMBAR SPINE    Pneumonia     Pressure ulcer, stage 1     Reflux esophagitis     Seasonal allergies     Sinus trouble     Type 2 diabetes mellitus without complication     Vertigo        Family History:   Family History   Problem Relation Age of Onset    Cancer Mother     Diabetes type II Mother     Kidney cancer Mother     Heart disease Father 59        MI MASSIVE     Arthritis Father     Heart disease Brother 50    Diabetes type II Brother        Social History:  reports that she has never smoked. She has never been exposed to tobacco smoke. She  has never used smokeless tobacco. She reports that she does not currently use alcohol. She reports that she does not use drugs.    Home Medications:  EPINEPHrine, albuterol sulfate HFA, aspirin, azelastine, calcium carbonate-vitamin d, fexofenadine, fluticasone, fluticasone-salmeterol, hydroCHLOROthiazide, levalbuterol, metFORMIN, montelukast, potassium chloride, pravastatin, and vitamin D    Allergies:  No Known Allergies    Objective    Objective     Vitals:   Heart Rate:  [84] 84  BP: (134)/(76) 134/76  Body mass index is 31.64 kg/m².  PHYSICAL EXAM:    General Appearance:   well developed  well nourished  HENT:   oropharynx moist  lips not cyanotic  Neck:  thyroid not enlarged  supple  Respiratory:  no respiratory distress  normal breath sounds  no rales  Cardiovascular:  no jugular venous distention  regular rhythm  apical impulse normal  S1 normal, S2 normal  no S3, no S4   no murmur  no rub, no thrill  carotid pulses normal; no bruit  pedal pulses normal  lower extremity edema: none    Skin:   warm, dry  Psychiatric:  judgement and insight appropriate  normal mood and affect    RESULTS:    EKG reviewed by me and shows sinus rhythm with right bundle branch block pattern.       Result Review    Result Review:  I have personally reviewed the available results:  [x]  Laboratory  [x]  EKG/Telemetry   [x]  Cardiology/Vascular   [x] Medications  [x]  Old records  Lab Results   Component Value Date    CHOL 153 06/28/2023    CHOL 150 01/20/2023    CHOL 152 06/20/2022     Lab Results   Component Value Date    TRIG 179 (H) 06/28/2023    TRIG 155 (H) 01/20/2023    TRIG 151 (H) 06/20/2022     Lab Results   Component Value Date    HDL 38 (L) 06/28/2023    HDL 42 01/20/2023    HDL 39 (L) 06/20/2022     Lab Results   Component Value Date    LDL 84 06/28/2023    LDL 81 01/20/2023    LDL 87 06/20/2022     Lab Results   Component Value Date    VLDL 31 06/28/2023    VLDL 27 01/20/2023    VLDL 26 06/20/2022       Procedures      Impression/Plan  1.  Abnormal EKG with positive risk factors: Sestamibi stress test evaluate for any ischemia.  2.  Shortness of breath: Echocardiogram.  3.  Essential hypertension controlled: Continue hydrochlorothiazide 25 mg once daily.  Monitor blood pressure regularly.  4.  Hyperlipidemia: Continue Pravachol 10 mg once a day.  Lipid profile is reviewed which shows an LDL of 84.  5.  Type 2 diabetes mellitus: Continue metformin.        Electronically signed by Cristopher Mclain MD, 11/14/23, 1:08 PM EST.

## 2023-12-07 ENCOUNTER — HOSPITAL ENCOUNTER (OUTPATIENT)
Dept: NUCLEAR MEDICINE | Facility: HOSPITAL | Age: 78
Discharge: HOME | End: 2023-12-07
Payer: MEDICARE

## 2023-12-07 DIAGNOSIS — R06.02 SHORTNESS OF BREATH: ICD-10-CM

## 2023-12-07 LAB
BH CV IMMEDIATE POST TECH DATA BLOOD PRESSURE: NORMAL MMHG
BH CV IMMEDIATE POST TECH DATA HEART RATE: 79 BPM
BH CV IMMEDIATE POST TECH DATA OXYGEN SATS: 98 %
BH CV REST NUCLEAR ISOTOPE DOSE: 8.8 MCI
BH CV SIX MINUTE RECOVERY TECH DATA BLOOD PRESSURE: NORMAL
BH CV SIX MINUTE RECOVERY TECH DATA HEART RATE: 77 BPM
BH CV SIX MINUTE RECOVERY TECH DATA OXYGEN SATURATION: 98 %
BH CV STRESS BP STAGE 1: NORMAL
BH CV STRESS COMMENTS STAGE 1: NORMAL
BH CV STRESS DOSE REGADENOSON STAGE 1: 0.4
BH CV STRESS DURATION MIN STAGE 1: 0
BH CV STRESS DURATION SEC STAGE 1: 10
BH CV STRESS HR STAGE 1: 74
BH CV STRESS NUCLEAR ISOTOPE DOSE: 35.2 MCI
BH CV STRESS O2 STAGE 1: 98
BH CV STRESS PROTOCOL 1: NORMAL
BH CV STRESS RECOVERY BP: NORMAL MMHG
BH CV STRESS RECOVERY HR: 77 BPM
BH CV STRESS RECOVERY O2: 98 %
BH CV STRESS STAGE 1: 1
BH CV THREE MINUTE POST TECH DATA BLOOD PRESSURE: NORMAL MMHG
BH CV THREE MINUTE POST TECH DATA HEART RATE: 87 BPM
BH CV THREE MINUTE POST TECH DATA OXYGEN SATURATION: 98 %
LV EF NUC BP: 88 %
MAXIMAL PREDICTED HEART RATE: 145 BPM
PERCENT MAX PREDICTED HR: 61.38 %
STRESS BASELINE BP: NORMAL MMHG
STRESS BASELINE HR: 63 BPM
STRESS O2 SAT REST: 95 %
STRESS PERCENT HR: 72 %
STRESS POST O2 SAT PEAK: 98 %
STRESS POST PEAK BP: NORMAL MMHG
STRESS POST PEAK HR: 89 BPM
STRESS TARGET HR: 123 BPM

## 2023-12-07 PROCEDURE — 78452 HT MUSCLE IMAGE SPECT MULT: CPT

## 2023-12-07 PROCEDURE — 93017 CV STRESS TEST TRACING ONLY: CPT

## 2023-12-07 PROCEDURE — 25010000002 REGADENOSON 0.4 MG/5ML SOLUTION: Performed by: SPECIALIST

## 2023-12-07 PROCEDURE — A9502 TC99M TETROFOSMIN: HCPCS | Performed by: SPECIALIST

## 2023-12-07 PROCEDURE — 0 TECHNETIUM TETROFOSMIN KIT: Performed by: SPECIALIST

## 2023-12-07 RX ORDER — REGADENOSON 0.08 MG/ML
0.4 INJECTION, SOLUTION INTRAVENOUS
Status: COMPLETED | OUTPATIENT
Start: 2023-12-07 | End: 2023-12-07

## 2023-12-07 RX ADMIN — REGADENOSON 0.4 MG: 0.08 INJECTION, SOLUTION INTRAVENOUS at 08:25

## 2023-12-07 RX ADMIN — TETROFOSMIN 1 DOSE: 1.38 INJECTION, POWDER, LYOPHILIZED, FOR SOLUTION INTRAVENOUS at 07:10

## 2023-12-07 RX ADMIN — TETROFOSMIN 1 DOSE: 1.38 INJECTION, POWDER, LYOPHILIZED, FOR SOLUTION INTRAVENOUS at 08:25

## 2023-12-28 ENCOUNTER — OFFICE VISIT (OUTPATIENT)
Dept: FAMILY MEDICINE CLINIC | Facility: CLINIC | Age: 78
End: 2023-12-28
Payer: MEDICARE

## 2023-12-28 VITALS
BODY MASS INDEX: 31.77 KG/M2 | TEMPERATURE: 97.1 F | OXYGEN SATURATION: 97 % | DIASTOLIC BLOOD PRESSURE: 60 MMHG | HEART RATE: 51 BPM | SYSTOLIC BLOOD PRESSURE: 112 MMHG | WEIGHT: 202.4 LBS | HEIGHT: 67 IN

## 2023-12-28 DIAGNOSIS — E55.9 VITAMIN D DEFICIENCY: ICD-10-CM

## 2023-12-28 DIAGNOSIS — E66.09 CLASS 1 OBESITY DUE TO EXCESS CALORIES WITH SERIOUS COMORBIDITY AND BODY MASS INDEX (BMI) OF 31.0 TO 31.9 IN ADULT: ICD-10-CM

## 2023-12-28 DIAGNOSIS — E11.59 TYPE 2 DIABETES MELLITUS WITH OTHER CIRCULATORY COMPLICATION, WITHOUT LONG-TERM CURRENT USE OF INSULIN: ICD-10-CM

## 2023-12-28 DIAGNOSIS — E53.8 VITAMIN B12 DEFICIENCY: ICD-10-CM

## 2023-12-28 DIAGNOSIS — I10 ESSENTIAL HYPERTENSION: ICD-10-CM

## 2023-12-28 DIAGNOSIS — Z09 FOLLOW-UP EXAM: Primary | ICD-10-CM

## 2023-12-28 PROCEDURE — 82306 VITAMIN D 25 HYDROXY: CPT | Performed by: NURSE PRACTITIONER

## 2023-12-28 PROCEDURE — 85025 COMPLETE CBC W/AUTO DIFF WBC: CPT | Performed by: NURSE PRACTITIONER

## 2023-12-28 PROCEDURE — 82607 VITAMIN B-12: CPT | Performed by: NURSE PRACTITIONER

## 2023-12-28 PROCEDURE — 80061 LIPID PANEL: CPT | Performed by: NURSE PRACTITIONER

## 2023-12-28 PROCEDURE — 84443 ASSAY THYROID STIM HORMONE: CPT | Performed by: NURSE PRACTITIONER

## 2023-12-28 PROCEDURE — 83036 HEMOGLOBIN GLYCOSYLATED A1C: CPT | Performed by: NURSE PRACTITIONER

## 2023-12-28 PROCEDURE — 82746 ASSAY OF FOLIC ACID SERUM: CPT | Performed by: NURSE PRACTITIONER

## 2023-12-28 PROCEDURE — 80053 COMPREHEN METABOLIC PANEL: CPT | Performed by: NURSE PRACTITIONER

## 2023-12-28 RX ORDER — POTASSIUM CHLORIDE 20 MEQ/1
20 TABLET, EXTENDED RELEASE ORAL 2 TIMES DAILY
Qty: 180 TABLET | Refills: 1 | Status: SHIPPED | OUTPATIENT
Start: 2023-12-28

## 2023-12-28 RX ORDER — AZELASTINE 1 MG/ML
2 SPRAY, METERED NASAL 2 TIMES DAILY
Qty: 30 ML | Refills: 5 | Status: SHIPPED | OUTPATIENT
Start: 2023-12-28

## 2023-12-28 RX ORDER — FEXOFENADINE HCL 180 MG/1
180 TABLET ORAL DAILY
Qty: 90 TABLET | Refills: 2 | Status: SHIPPED | OUTPATIENT
Start: 2023-12-28

## 2023-12-28 RX ORDER — ERGOCALCIFEROL 1.25 MG/1
50000 CAPSULE ORAL
Qty: 13 CAPSULE | Refills: 1 | Status: SHIPPED | OUTPATIENT
Start: 2023-12-28

## 2023-12-28 RX ORDER — HYDROCHLOROTHIAZIDE 25 MG/1
25 TABLET ORAL DAILY
Qty: 90 TABLET | Refills: 1 | Status: SHIPPED | OUTPATIENT
Start: 2023-12-28 | End: 2024-06-25

## 2023-12-28 RX ORDER — PEN NEEDLE, DIABETIC 31 GX5/16"
1 NEEDLE, DISPOSABLE MISCELLANEOUS 2 TIMES DAILY
Qty: 180 EACH | Refills: 2 | Status: SHIPPED | OUTPATIENT
Start: 2023-12-28 | End: 2024-03-27

## 2023-12-28 RX ORDER — FLUTICASONE PROPIONATE AND SALMETEROL XINAFOATE 230; 21 UG/1; UG/1
2 AEROSOL, METERED RESPIRATORY (INHALATION) 2 TIMES DAILY
Qty: 8 G | Refills: 1 | Status: SHIPPED | OUTPATIENT
Start: 2023-12-28

## 2023-12-28 RX ORDER — LEVALBUTEROL TARTRATE 45 UG/1
1-2 AEROSOL, METERED ORAL EVERY 4 HOURS PRN
Qty: 15 G | Refills: 11 | Status: SHIPPED | OUTPATIENT
Start: 2023-12-28

## 2023-12-28 RX ORDER — ALBUTEROL SULFATE 90 UG/1
2 AEROSOL, METERED RESPIRATORY (INHALATION) EVERY 6 HOURS
Qty: 8.6 G | Refills: 1 | Status: SHIPPED | OUTPATIENT
Start: 2023-12-28

## 2023-12-28 RX ORDER — MONTELUKAST SODIUM 10 MG/1
10 TABLET ORAL NIGHTLY
Qty: 90 TABLET | Refills: 1 | Status: SHIPPED | OUTPATIENT
Start: 2023-12-28

## 2023-12-28 RX ORDER — LANCETS 23 GAUGE
1 EACH MISCELLANEOUS 2 TIMES DAILY
Qty: 180 EACH | Refills: 1 | Status: SHIPPED | OUTPATIENT
Start: 2023-12-28

## 2023-12-28 RX ORDER — BLOOD-GLUCOSE METER
1 KIT MISCELLANEOUS AS NEEDED
Qty: 1 EACH | Refills: 0 | Status: SHIPPED | OUTPATIENT
Start: 2023-12-28 | End: 2026-09-22

## 2023-12-28 RX ORDER — PRAVASTATIN SODIUM 10 MG
10 TABLET ORAL DAILY
Qty: 90 TABLET | Refills: 1 | Status: SHIPPED | OUTPATIENT
Start: 2023-12-28

## 2023-12-28 RX ORDER — FLUTICASONE PROPIONATE 50 MCG
1 SPRAY, SUSPENSION (ML) NASAL DAILY
Qty: 1 ML | Refills: 5 | Status: SHIPPED | OUTPATIENT
Start: 2023-12-28

## 2023-12-28 NOTE — PROGRESS NOTES
Chief Complaint  Hyperlipidemia, Hypertension, and Diabetes    Subjective        Medical History: has a past medical history of Allergic rhinitis (10/30/2014), Arthritis, Asthma, Bronchitis, Chronic pain of right knee (01/29/2015), Deafness, Gall stones (1997), High blood pressure, High cholesterol, Hyperlipemia, Hyperlipidemia (02/08/2018), Hypertension, Impaired fasting glucose (04/28/2014), Osteopenia (11/2015), Pneumonia, Pressure ulcer, stage 1, Reflux esophagitis, Seasonal allergies, Sinus trouble, Type 2 diabetes mellitus without complication, and Vertigo.     Surgical History: has a past surgical history that includes Colonoscopy; Gallbladder surgery (1997); Hysterectomy (1983); and Tonsillectomy.     Family History: family history includes Arthritis in her father; Cancer in her mother; Diabetes type II in her brother and mother; Heart disease (age of onset: 50) in her brother; Heart disease (age of onset: 59) in her father; Kidney cancer in her mother.     Social History: reports that she has never smoked. She has never been exposed to tobacco smoke. She has never used smokeless tobacco. She reports that she does not currently use alcohol. She reports that she does not use drugs.    Gaby Reyes presents to [facility]  History of Present Illness  Diabetes  She presents for her follow-up diabetic visit. She has type 2 diabetes mellitus. Her disease course has been stable. Pertinent negatives for hypoglycemia include no headaches. Associated symptoms include fatigue. Pertinent negatives for diabetes include no polydipsia, no polyphagia, no polyuria and no visual change. There are no hypoglycemic complications. Symptoms are stable. There are no diabetic complications. Risk factors for coronary artery disease include diabetes mellitus, dyslipidemia, obesity, hypertension and post-menopausal. Current diabetic treatment includes oral agent (monotherapy). She is compliant with  "treatment most of the time. She is following a generally healthy diet. When asked about meal planning, she reported none. An ACE inhibitor/angiotensin II receptor blocker is being taken. She does not see a podiatrist.Eye exam is not current.   Hyperlipidemia  This is a chronic problem. The current episode started more than 1 year ago. The problem is controlled. Exacerbating diseases include diabetes and obesity. Pertinent negatives include no shortness of breath. Current antihyperlipidemic treatment includes statins. The current treatment provides significant improvement of lipids. There are no compliance problems.  Risk factors for coronary artery disease include diabetes mellitus, dyslipidemia, hypertension, obesity and post-menopausal.    Hypertension  This is a chronic problem. The current episode started more than 1 year ago. The problem is controlled. Pertinent negatives include no anxiety, headaches, palpitations, peripheral edema or shortness of breath. Risk factors for coronary artery disease include obesity, post-menopausal state, diabetes mellitus and dyslipidemia. Past treatments include direct vasodilators. Current antihypertension treatment includes diuretics. The current treatment provides significant improvement. There are no compliance problems.    Vitamin D  This is a chronic problem.  Current episode has been for longer than 6 months.  Problem has gradually been improving since she has been on vitamin D supplements.  She denies any excessive fatigue, hair loss, skin changes due to the vitamin D deficiency.  She tries to eat a well-balanced diet.  She has been on the vitamin D weekly, no compliance problems. Condition is stable.      Objective   Vital Signs:   /60   Pulse 51   Temp 97.1 °F (36.2 °C)   Ht 170.2 cm (67\")   Wt 91.8 kg (202 lb 6.4 oz)   SpO2 97%   BMI 31.70 kg/m²       Wt Readings from Last 3 Encounters:   12/28/23 91.8 kg (202 lb 6.4 oz)   11/14/23 91.6 kg (202 lb) "   10/02/23 92.5 kg (204 lb)        BP Readings from Last 3 Encounters:   12/28/23 112/60   11/14/23 134/76   10/02/23 120/64     Physical Exam  Vitals reviewed.   Constitutional:       General: She is not in acute distress.     Appearance: Normal appearance. She is well-developed. She is obese. She is not ill-appearing.   HENT:      Head: Normocephalic and atraumatic.      Right Ear: External ear normal.      Left Ear: External ear normal.   Eyes:      Conjunctiva/sclera: Conjunctivae normal.      Pupils: Pupils are equal, round, and reactive to light.   Cardiovascular:      Rate and Rhythm: Normal rate and regular rhythm.      Heart sounds: No murmur heard.  Pulmonary:      Effort: Pulmonary effort is normal.      Breath sounds: Normal breath sounds. No wheezing or rhonchi.   Musculoskeletal:      Right lower leg: No edema.      Left lower leg: No edema.   Skin:     General: Skin is warm and dry.   Neurological:      Mental Status: She is alert and oriented to person, place, and time.   Psychiatric:         Mood and Affect: Mood and affect normal.         Behavior: Behavior normal.         Thought Content: Thought content normal.         Judgment: Judgment normal.        Result Review :  {The following data was reviewed by CALISTA Mejia on 12/28/2023.  Common labs          1/27/2023    12:30 6/28/2023    13:43 11/4/2023    11:22   Common Labs   Glucose  169  116    BUN  17  14    Creatinine  0.75  0.58    Sodium  140  139    Potassium 3.6  3.0  3.6    Chloride  102  101    Calcium  9.7  9.5    Albumin  4.3     Total Bilirubin  0.5     Alkaline Phosphatase  70     AST (SGOT)  18     ALT (SGPT)  11     WBC  11.90     Hemoglobin  14.8     Hematocrit  43.2     Platelets  367     Total Cholesterol  153     Triglycerides  179     HDL Cholesterol  38     LDL Cholesterol   84     Hemoglobin A1C  5.60       Data reviewed : previous office note             Current Outpatient Medications on File Prior to Visit    Medication Sig Dispense Refill    aspirin 81 MG chewable tablet Chew 1 tablet Daily. 90 tablet 1    EPINEPHrine (EPIPEN) 0.3 MG/0.3ML solution auto-injector injection       [DISCONTINUED] albuterol sulfate  (90 Base) MCG/ACT inhaler Inhale 2 puffs Every 6 (Six) Hours. 8.6 g 1    [DISCONTINUED] azelastine (ASTELIN) 0.1 % nasal spray 2 sprays into the nostril(s) as directed by provider 2 (Two) Times a Day. Use in each nostril as directed 30 mL 5    [DISCONTINUED] calcium carbonate-vitamin d 600-400 MG-UNIT per tablet Take 1 tablet by mouth 2 (Two) Times a Day. 180 tablet 1    [DISCONTINUED] fexofenadine (ALLEGRA) 180 MG tablet Take 1 tablet by mouth Daily. 90 tablet 2    [DISCONTINUED] fluticasone (FLONASE) 50 MCG/ACT nasal spray 1 spray into the nostril(s) as directed by provider Daily. 1 mL 5    [DISCONTINUED] fluticasone-salmeterol (Advair HFA) 230-21 MCG/ACT inhaler Inhale 2 puffs 2 (Two) Times a Day for 90 days. 8 g 1    [DISCONTINUED] hydroCHLOROthiazide (HYDRODIURIL) 25 MG tablet Take 1 tablet by mouth Daily for 180 days. 90 tablet 1    [DISCONTINUED] levalbuterol (XOPENEX HFA) 45 MCG/ACT inhaler Inhale 1-2 puffs Every 4 (Four) Hours As Needed for Wheezing. 15 g 11    [DISCONTINUED] metFORMIN (GLUCOPHAGE) 500 MG tablet Take 1 tablet by mouth Daily. 90 tablet 1    [DISCONTINUED] montelukast (SINGULAIR) 10 MG tablet Take 1 tablet by mouth Every Night. 90 tablet 1    [DISCONTINUED] potassium chloride (K-DUR,KLOR-CON) 20 MEQ CR tablet Take 1 tablet by mouth 2 (Two) Times a Day. 180 tablet 1    [DISCONTINUED] pravastatin (PRAVACHOL) 10 MG tablet Take 1 tablet by mouth Daily. 90 tablet 1    [DISCONTINUED] vitamin D (ERGOCALCIFEROL) 1.25 MG (71096 UT) capsule capsule Take 1 capsule by mouth Every 7 (Seven) Days. 13 capsule 1     No current facility-administered medications on file prior to visit.        Assessment and Plan  Diagnoses and all orders for this visit:    1. Follow-up exam (Primary)    2.  Essential hypertension  -     CBC Auto Differential  -     Comprehensive Metabolic Panel    3. Type 2 diabetes mellitus with other circulatory complication, without long-term current use of insulin  -     CBC Auto Differential  -     Comprehensive Metabolic Panel  -     Hemoglobin A1c  -     Lipid Panel  -     TSH  -     Alcohol Swabs (Alcohol Prep) pads; Use 1 Pad 2 (Two) Times a Day for 90 days.  Dispense: 180 each; Refill: 2  -     glucose blood test strip; BID fasting and 2hr after largest meal E11.9  Dispense: 180 each; Refill: 2  -     glucose monitor monitoring kit; Use 1 each As Needed (BID). E11.9 BID fasting and 2hr after largest meal  Dispense: 1 each; Refill: 0  -     Lancets 28G misc; Use 1 Device 2 (Two) Times a Day.  Dispense: 180 each; Refill: 1    4. Vitamin D deficiency  -     Vitamin D,25-Hydroxy    5. Vitamin B12 deficiency  -     Vitamin B12 & Folate    6. Class 1 obesity due to excess calories with serious comorbidity and body mass index (BMI) of 31.0 to 31.9 in adult    Other orders  -     vitamin D (ERGOCALCIFEROL) 1.25 MG (42601 UT) capsule capsule; Take 1 capsule by mouth Every 7 (Seven) Days.  Dispense: 13 capsule; Refill: 1  -     pravastatin (PRAVACHOL) 10 MG tablet; Take 1 tablet by mouth Daily.  Dispense: 90 tablet; Refill: 1  -     potassium chloride (K-DUR,KLOR-CON) 20 MEQ CR tablet; Take 1 tablet by mouth 2 (Two) Times a Day.  Dispense: 180 tablet; Refill: 1  -     montelukast (SINGULAIR) 10 MG tablet; Take 1 tablet by mouth Every Night.  Dispense: 90 tablet; Refill: 1  -     metFORMIN (GLUCOPHAGE) 500 MG tablet; Take 1 tablet by mouth Daily.  Dispense: 90 tablet; Refill: 1  -     levalbuterol (XOPENEX HFA) 45 MCG/ACT inhaler; Inhale 1-2 puffs Every 4 (Four) Hours As Needed for Wheezing.  Dispense: 15 g; Refill: 11  -     hydroCHLOROthiazide (HYDRODIURIL) 25 MG tablet; Take 1 tablet by mouth Daily for 180 days.  Dispense: 90 tablet; Refill: 1  -     fluticasone-salmeterol (Advair  HFA) 230-21 MCG/ACT inhaler; Inhale 2 puffs 2 (Two) Times a Day.  Dispense: 8 g; Refill: 1  -     fluticasone (FLONASE) 50 MCG/ACT nasal spray; 1 spray into the nostril(s) as directed by provider Daily.  Dispense: 1 mL; Refill: 5  -     fexofenadine (ALLEGRA) 180 MG tablet; Take 1 tablet by mouth Daily.  Dispense: 90 tablet; Refill: 2  -     Calcium Carbonate-Vitamin D 600-10 MG-MCG per tablet; Take 1 tablet by mouth 2 (Two) Times a Day.  Dispense: 180 tablet; Refill: 1  -     azelastine (ASTELIN) 0.1 % nasal spray; 2 sprays into the nostril(s) as directed by provider 2 (Two) Times a Day. Use in each nostril as directed  Dispense: 30 mL; Refill: 5  -     albuterol sulfate  (90 Base) MCG/ACT inhaler; Inhale 2 puffs Every 6 (Six) Hours.  Dispense: 8.6 g; Refill: 1    Overall patient is feeling well, blood pressure stable at 112/60, patient does not have a glucometer at home will send a prescription to patient's pharmacy for glucometer, alcohol pads and accessories.  Patient states that nothing significant has changed, no recent illnesses she is still on all of her current medications denies any swelling in her legs or feet.  Will refill patient's medication, discussed strict return precautions, will have patient follow-up in 6 months if labs are stable.  Patient verbalized understanding and is agreeable with current treatment plan.    Follow Up   Return in about 6 months (around 6/28/2024) for Recheck.  Patient was given instructions and counseling regarding her condition or for health maintenance advice. Please see specific information pulled into the AVS if appropriate.       Part of this note may be electronic transcription/translation of spoken language to printed text using the Dragon dictation system

## 2023-12-29 LAB
25(OH)D3 SERPL-MCNC: 37.1 NG/ML (ref 30–100)
ALBUMIN SERPL-MCNC: 4.5 G/DL (ref 3.5–5.2)
ALBUMIN/GLOB SERPL: 1.4 G/DL
ALP SERPL-CCNC: 83 U/L (ref 39–117)
ALT SERPL W P-5'-P-CCNC: 11 U/L (ref 1–33)
ANION GAP SERPL CALCULATED.3IONS-SCNC: 12 MMOL/L (ref 5–15)
AST SERPL-CCNC: 14 U/L (ref 1–32)
BASOPHILS # BLD AUTO: 0.06 10*3/MM3 (ref 0–0.2)
BASOPHILS NFR BLD AUTO: 0.7 % (ref 0–1.5)
BILIRUB SERPL-MCNC: 0.4 MG/DL (ref 0–1.2)
BUN SERPL-MCNC: 17 MG/DL (ref 8–23)
BUN/CREAT SERPL: 25 (ref 7–25)
CALCIUM SPEC-SCNC: 9.6 MG/DL (ref 8.6–10.5)
CHLORIDE SERPL-SCNC: 102 MMOL/L (ref 98–107)
CHOLEST SERPL-MCNC: 154 MG/DL (ref 0–200)
CO2 SERPL-SCNC: 24 MMOL/L (ref 22–29)
CREAT SERPL-MCNC: 0.68 MG/DL (ref 0.57–1)
DEPRECATED RDW RBC AUTO: 36.9 FL (ref 37–54)
EGFRCR SERPLBLD CKD-EPI 2021: 90.4 ML/MIN/1.73
EOSINOPHIL # BLD AUTO: 0.14 10*3/MM3 (ref 0–0.4)
EOSINOPHIL NFR BLD AUTO: 1.5 % (ref 0.3–6.2)
ERYTHROCYTE [DISTWIDTH] IN BLOOD BY AUTOMATED COUNT: 12.5 % (ref 12.3–15.4)
FOLATE SERPL-MCNC: 5.74 NG/ML (ref 4.78–24.2)
GLOBULIN UR ELPH-MCNC: 3.2 GM/DL
GLUCOSE SERPL-MCNC: 84 MG/DL (ref 65–99)
HBA1C MFR BLD: 6 % (ref 4.8–5.6)
HCT VFR BLD AUTO: 43.5 % (ref 34–46.6)
HDLC SERPL-MCNC: 44 MG/DL (ref 40–60)
HGB BLD-MCNC: 14.3 G/DL (ref 12–15.9)
IMM GRANULOCYTES # BLD AUTO: 0.02 10*3/MM3 (ref 0–0.05)
IMM GRANULOCYTES NFR BLD AUTO: 0.2 % (ref 0–0.5)
LDLC SERPL CALC-MCNC: 85 MG/DL (ref 0–100)
LDLC/HDLC SERPL: 1.85 {RATIO}
LYMPHOCYTES # BLD AUTO: 3.6 10*3/MM3 (ref 0.7–3.1)
LYMPHOCYTES NFR BLD AUTO: 39.5 % (ref 19.6–45.3)
MCH RBC QN AUTO: 27 PG (ref 26.6–33)
MCHC RBC AUTO-ENTMCNC: 32.9 G/DL (ref 31.5–35.7)
MCV RBC AUTO: 82.2 FL (ref 79–97)
MONOCYTES # BLD AUTO: 0.64 10*3/MM3 (ref 0.1–0.9)
MONOCYTES NFR BLD AUTO: 7 % (ref 5–12)
NEUTROPHILS NFR BLD AUTO: 4.66 10*3/MM3 (ref 1.7–7)
NEUTROPHILS NFR BLD AUTO: 51.1 % (ref 42.7–76)
NRBC BLD AUTO-RTO: 0 /100 WBC (ref 0–0.2)
PLATELET # BLD AUTO: 365 10*3/MM3 (ref 140–450)
PMV BLD AUTO: 10.9 FL (ref 6–12)
POTASSIUM SERPL-SCNC: 3.4 MMOL/L (ref 3.5–5.2)
PROT SERPL-MCNC: 7.7 G/DL (ref 6–8.5)
RBC # BLD AUTO: 5.29 10*6/MM3 (ref 3.77–5.28)
SODIUM SERPL-SCNC: 138 MMOL/L (ref 136–145)
TRIGL SERPL-MCNC: 143 MG/DL (ref 0–150)
TSH SERPL DL<=0.05 MIU/L-ACNC: 1.98 UIU/ML (ref 0.27–4.2)
VIT B12 BLD-MCNC: 347 PG/ML (ref 211–946)
VLDLC SERPL-MCNC: 25 MG/DL (ref 5–40)
WBC NRBC COR # BLD AUTO: 9.12 10*3/MM3 (ref 3.4–10.8)

## 2023-12-29 NOTE — PROGRESS NOTES
Called and spoke to Gaby Reyes to relay results and PCP guidance. Pt verbalized understanding. No further questions/concerns at this time.

## 2024-02-07 DIAGNOSIS — R06.02 SHORTNESS OF BREATH: Primary | ICD-10-CM

## 2024-02-14 ENCOUNTER — HOSPITAL ENCOUNTER (OUTPATIENT)
Dept: CARDIOLOGY | Facility: HOSPITAL | Age: 79
Discharge: HOME | End: 2024-02-14
Admitting: SPECIALIST
Payer: MEDICARE

## 2024-02-14 DIAGNOSIS — R06.02 SHORTNESS OF BREATH: ICD-10-CM

## 2024-02-14 PROCEDURE — 93306 TTE W/DOPPLER COMPLETE: CPT

## 2024-02-15 ENCOUNTER — TELEPHONE (OUTPATIENT)
Dept: CARDIOLOGY | Facility: CLINIC | Age: 79
End: 2024-02-15
Payer: MEDICARE

## 2024-02-15 LAB
ASCENDING AORTA: 3.7 CM
BH CV ECHO MEAS - AO MAX PG: 13 MMHG
BH CV ECHO MEAS - AO MEAN PG: 6.9 MMHG
BH CV ECHO MEAS - AO ROOT DIAM: 3.4 CM
BH CV ECHO MEAS - AO V2 MAX: 178 CM/SEC
BH CV ECHO MEAS - AO V2 VTI: 39.6 CM
BH CV ECHO MEAS - AVA(I,D): 2.27 CM2
BH CV ECHO MEAS - EDV(CUBED): 90.3 ML
BH CV ECHO MEAS - EDV(MOD-SP2): 39.7 ML
BH CV ECHO MEAS - EDV(MOD-SP4): 58.1 ML
BH CV ECHO MEAS - EF(MOD-BP): 72.2 %
BH CV ECHO MEAS - EF(MOD-SP2): 73.8 %
BH CV ECHO MEAS - EF(MOD-SP4): 69.7 %
BH CV ECHO MEAS - ESV(CUBED): 14.2 ML
BH CV ECHO MEAS - ESV(MOD-SP2): 10.4 ML
BH CV ECHO MEAS - ESV(MOD-SP4): 17.6 ML
BH CV ECHO MEAS - FS: 46.1 %
BH CV ECHO MEAS - IVS/LVPW: 1.22 CM
BH CV ECHO MEAS - IVSD: 1.33 CM
BH CV ECHO MEAS - LA DIMENSION: 4.1 CM
BH CV ECHO MEAS - LAT PEAK E' VEL: 6.2 CM/SEC
BH CV ECHO MEAS - LV DIASTOLIC VOL/BSA (35-75): 28.4 CM2
BH CV ECHO MEAS - LV MASS(C)D: 198.1 GRAMS
BH CV ECHO MEAS - LV MAX PG: 7.7 MMHG
BH CV ECHO MEAS - LV MEAN PG: 4 MMHG
BH CV ECHO MEAS - LV SYSTOLIC VOL/BSA (12-30): 8.6 CM2
BH CV ECHO MEAS - LV V1 MAX: 138.4 CM/SEC
BH CV ECHO MEAS - LV V1 VTI: 29.9 CM
BH CV ECHO MEAS - LVIDD: 4.5 CM
BH CV ECHO MEAS - LVIDS: 2.42 CM
BH CV ECHO MEAS - LVOT AREA: 3 CM2
BH CV ECHO MEAS - LVOT DIAM: 1.96 CM
BH CV ECHO MEAS - LVPWD: 1.08 CM
BH CV ECHO MEAS - MED PEAK E' VEL: 6.1 CM/SEC
BH CV ECHO MEAS - MV A MAX VEL: 131.2 CM/SEC
BH CV ECHO MEAS - MV DEC SLOPE: 434.8 CM/SEC2
BH CV ECHO MEAS - MV DEC TIME: 0.25 SEC
BH CV ECHO MEAS - MV E MAX VEL: 123 CM/SEC
BH CV ECHO MEAS - MV E/A: 0.94
BH CV ECHO MEAS - MV MAX PG: 7 MMHG
BH CV ECHO MEAS - MV MEAN PG: 2.7 MMHG
BH CV ECHO MEAS - MV P1/2T: 79.6 MSEC
BH CV ECHO MEAS - MV V2 VTI: 47.7 CM
BH CV ECHO MEAS - MVA(P1/2T): 2.8 CM2
BH CV ECHO MEAS - RAP SYSTOLE: 3 MMHG
BH CV ECHO MEAS - RVDD: 2.41 CM
BH CV ECHO MEAS - RVSP: 13.9 MMHG
BH CV ECHO MEAS - SI(MOD-SP2): 14.3 ML/M2
BH CV ECHO MEAS - SI(MOD-SP4): 19.8 ML/M2
BH CV ECHO MEAS - SV(LVOT): 90.2 ML
BH CV ECHO MEAS - SV(MOD-SP2): 29.3 ML
BH CV ECHO MEAS - SV(MOD-SP4): 40.5 ML
BH CV ECHO MEAS - TR MAX PG: 10.9 MMHG
BH CV ECHO MEAS - TR MAX VEL: 165.1 CM/SEC
BH CV ECHO MEASUREMENTS AVERAGE E/E' RATIO: 20
IVRT: 95 MS
LEFT ATRIUM VOLUME INDEX: 26 ML/M2

## 2024-06-28 ENCOUNTER — OFFICE VISIT (OUTPATIENT)
Dept: FAMILY MEDICINE CLINIC | Facility: CLINIC | Age: 79
End: 2024-06-28
Payer: MEDICARE

## 2024-06-28 VITALS
OXYGEN SATURATION: 99 % | HEART RATE: 95 BPM | HEIGHT: 67 IN | TEMPERATURE: 96.4 F | WEIGHT: 215 LBS | BODY MASS INDEX: 33.74 KG/M2 | DIASTOLIC BLOOD PRESSURE: 88 MMHG | SYSTOLIC BLOOD PRESSURE: 140 MMHG

## 2024-06-28 DIAGNOSIS — I10 ESSENTIAL HYPERTENSION: ICD-10-CM

## 2024-06-28 DIAGNOSIS — E53.8 VITAMIN B12 DEFICIENCY: ICD-10-CM

## 2024-06-28 DIAGNOSIS — E55.9 VITAMIN D DEFICIENCY: ICD-10-CM

## 2024-06-28 DIAGNOSIS — Z00.00 ENCOUNTER FOR SUBSEQUENT ANNUAL WELLNESS VISIT (AWV) IN MEDICARE PATIENT: Primary | ICD-10-CM

## 2024-06-28 DIAGNOSIS — E11.59 TYPE 2 DIABETES MELLITUS WITH OTHER CIRCULATORY COMPLICATION, WITHOUT LONG-TERM CURRENT USE OF INSULIN: ICD-10-CM

## 2024-06-28 DIAGNOSIS — E66.09 CLASS 1 OBESITY DUE TO EXCESS CALORIES WITH SERIOUS COMORBIDITY AND BODY MASS INDEX (BMI) OF 33.0 TO 33.9 IN ADULT: ICD-10-CM

## 2024-06-28 LAB
25(OH)D3 SERPL-MCNC: 24.3 NG/ML (ref 30–100)
ALBUMIN SERPL-MCNC: 4.1 G/DL (ref 3.5–5.2)
ALBUMIN UR-MCNC: <1.2 MG/DL
ALBUMIN/GLOB SERPL: 1.4 G/DL
ALP SERPL-CCNC: 78 U/L (ref 39–117)
ALT SERPL W P-5'-P-CCNC: 18 U/L (ref 1–33)
ANION GAP SERPL CALCULATED.3IONS-SCNC: 13.4 MMOL/L (ref 5–15)
AST SERPL-CCNC: 24 U/L (ref 1–32)
BASOPHILS # BLD AUTO: 0.06 10*3/MM3 (ref 0–0.2)
BASOPHILS NFR BLD AUTO: 0.6 % (ref 0–1.5)
BILIRUB SERPL-MCNC: 0.4 MG/DL (ref 0–1.2)
BUN SERPL-MCNC: 15 MG/DL (ref 8–23)
BUN/CREAT SERPL: 20.5 (ref 7–25)
CALCIUM SPEC-SCNC: 9.2 MG/DL (ref 8.6–10.5)
CHLORIDE SERPL-SCNC: 102 MMOL/L (ref 98–107)
CHOLEST SERPL-MCNC: 187 MG/DL (ref 0–200)
CO2 SERPL-SCNC: 22.6 MMOL/L (ref 22–29)
CREAT SERPL-MCNC: 0.73 MG/DL (ref 0.57–1)
DEPRECATED RDW RBC AUTO: 40.7 FL (ref 37–54)
EGFRCR SERPLBLD CKD-EPI 2021: 85.4 ML/MIN/1.73
EOSINOPHIL # BLD AUTO: 0.08 10*3/MM3 (ref 0–0.4)
EOSINOPHIL NFR BLD AUTO: 0.8 % (ref 0.3–6.2)
ERYTHROCYTE [DISTWIDTH] IN BLOOD BY AUTOMATED COUNT: 13.1 % (ref 12.3–15.4)
FOLATE SERPL-MCNC: 10.6 NG/ML (ref 4.78–24.2)
GLOBULIN UR ELPH-MCNC: 3 GM/DL
GLUCOSE SERPL-MCNC: 98 MG/DL (ref 65–99)
HBA1C MFR BLD: 5.5 % (ref 4.8–5.6)
HCT VFR BLD AUTO: 43.6 % (ref 34–46.6)
HDLC SERPL-MCNC: 40 MG/DL (ref 40–60)
HGB BLD-MCNC: 14.6 G/DL (ref 12–15.9)
IMM GRANULOCYTES # BLD AUTO: 0.03 10*3/MM3 (ref 0–0.05)
IMM GRANULOCYTES NFR BLD AUTO: 0.3 % (ref 0–0.5)
LDLC SERPL CALC-MCNC: 116 MG/DL (ref 0–100)
LDLC/HDLC SERPL: 2.81 {RATIO}
LYMPHOCYTES # BLD AUTO: 3.16 10*3/MM3 (ref 0.7–3.1)
LYMPHOCYTES NFR BLD AUTO: 32.3 % (ref 19.6–45.3)
MCH RBC QN AUTO: 28.5 PG (ref 26.6–33)
MCHC RBC AUTO-ENTMCNC: 33.5 G/DL (ref 31.5–35.7)
MCV RBC AUTO: 85.2 FL (ref 79–97)
MONOCYTES # BLD AUTO: 0.55 10*3/MM3 (ref 0.1–0.9)
MONOCYTES NFR BLD AUTO: 5.6 % (ref 5–12)
NEUTROPHILS NFR BLD AUTO: 5.91 10*3/MM3 (ref 1.7–7)
NEUTROPHILS NFR BLD AUTO: 60.4 % (ref 42.7–76)
NRBC BLD AUTO-RTO: 0 /100 WBC (ref 0–0.2)
PLATELET # BLD AUTO: 395 10*3/MM3 (ref 140–450)
PMV BLD AUTO: 11.4 FL (ref 6–12)
POTASSIUM SERPL-SCNC: 3.3 MMOL/L (ref 3.5–5.2)
PROT SERPL-MCNC: 7.1 G/DL (ref 6–8.5)
RBC # BLD AUTO: 5.12 10*6/MM3 (ref 3.77–5.28)
SODIUM SERPL-SCNC: 138 MMOL/L (ref 136–145)
TRIGL SERPL-MCNC: 173 MG/DL (ref 0–150)
TSH SERPL DL<=0.05 MIU/L-ACNC: 1.85 UIU/ML (ref 0.27–4.2)
VIT B12 BLD-MCNC: 311 PG/ML (ref 211–946)
VLDLC SERPL-MCNC: 31 MG/DL (ref 5–40)
WBC NRBC COR # BLD AUTO: 9.79 10*3/MM3 (ref 3.4–10.8)

## 2024-06-28 PROCEDURE — 83036 HEMOGLOBIN GLYCOSYLATED A1C: CPT | Performed by: NURSE PRACTITIONER

## 2024-06-28 PROCEDURE — G0439 PPPS, SUBSEQ VISIT: HCPCS | Performed by: NURSE PRACTITIONER

## 2024-06-28 PROCEDURE — 80061 LIPID PANEL: CPT | Performed by: NURSE PRACTITIONER

## 2024-06-28 PROCEDURE — 1126F AMNT PAIN NOTED NONE PRSNT: CPT | Performed by: NURSE PRACTITIONER

## 2024-06-28 PROCEDURE — 1159F MED LIST DOCD IN RCRD: CPT | Performed by: NURSE PRACTITIONER

## 2024-06-28 PROCEDURE — 80053 COMPREHEN METABOLIC PANEL: CPT | Performed by: NURSE PRACTITIONER

## 2024-06-28 PROCEDURE — 84443 ASSAY THYROID STIM HORMONE: CPT | Performed by: NURSE PRACTITIONER

## 2024-06-28 PROCEDURE — 99213 OFFICE O/P EST LOW 20 MIN: CPT | Performed by: NURSE PRACTITIONER

## 2024-06-28 PROCEDURE — 82746 ASSAY OF FOLIC ACID SERUM: CPT | Performed by: NURSE PRACTITIONER

## 2024-06-28 PROCEDURE — 1160F RVW MEDS BY RX/DR IN RCRD: CPT | Performed by: NURSE PRACTITIONER

## 2024-06-28 PROCEDURE — 85025 COMPLETE CBC W/AUTO DIFF WBC: CPT | Performed by: NURSE PRACTITIONER

## 2024-06-28 PROCEDURE — 36415 COLL VENOUS BLD VENIPUNCTURE: CPT | Performed by: NURSE PRACTITIONER

## 2024-06-28 PROCEDURE — 82306 VITAMIN D 25 HYDROXY: CPT | Performed by: NURSE PRACTITIONER

## 2024-06-28 PROCEDURE — 82607 VITAMIN B-12: CPT | Performed by: NURSE PRACTITIONER

## 2024-06-28 PROCEDURE — 82043 UR ALBUMIN QUANTITATIVE: CPT | Performed by: NURSE PRACTITIONER

## 2024-06-28 RX ORDER — MONTELUKAST SODIUM 10 MG/1
10 TABLET ORAL NIGHTLY
Qty: 90 TABLET | Refills: 1 | Status: SHIPPED | OUTPATIENT
Start: 2024-06-28

## 2024-06-28 RX ORDER — HYDROCHLOROTHIAZIDE 25 MG/1
25 TABLET ORAL DAILY
Qty: 90 TABLET | Refills: 1 | Status: SHIPPED | OUTPATIENT
Start: 2024-06-28 | End: 2024-12-25

## 2024-06-28 RX ORDER — ALBUTEROL SULFATE 90 UG/1
2 AEROSOL, METERED RESPIRATORY (INHALATION) EVERY 6 HOURS
Qty: 8.6 G | Refills: 1 | Status: SHIPPED | OUTPATIENT
Start: 2024-06-28

## 2024-06-28 RX ORDER — ASPIRIN 81 MG/1
81 TABLET, CHEWABLE ORAL DAILY
Qty: 90 TABLET | Refills: 1 | Status: SHIPPED | OUTPATIENT
Start: 2024-06-28

## 2024-06-28 RX ORDER — LEVALBUTEROL TARTRATE 45 UG/1
1-2 AEROSOL, METERED ORAL EVERY 4 HOURS PRN
Qty: 15 G | Refills: 11 | Status: SHIPPED | OUTPATIENT
Start: 2024-06-28

## 2024-06-28 RX ORDER — FEXOFENADINE HCL 180 MG/1
180 TABLET ORAL DAILY
Qty: 90 TABLET | Refills: 2 | Status: SHIPPED | OUTPATIENT
Start: 2024-06-28

## 2024-06-28 RX ORDER — FLUTICASONE PROPIONATE AND SALMETEROL XINAFOATE 230; 21 UG/1; UG/1
2 AEROSOL, METERED RESPIRATORY (INHALATION) 2 TIMES DAILY
Qty: 8 G | Refills: 1 | Status: SHIPPED | OUTPATIENT
Start: 2024-06-28

## 2024-06-28 RX ORDER — FLUTICASONE PROPIONATE 50 MCG
1 SPRAY, SUSPENSION (ML) NASAL DAILY
Qty: 1 ML | Refills: 5 | Status: SHIPPED | OUTPATIENT
Start: 2024-06-28

## 2024-06-28 RX ORDER — PRAVASTATIN SODIUM 10 MG
10 TABLET ORAL DAILY
Qty: 90 TABLET | Refills: 1 | Status: SHIPPED | OUTPATIENT
Start: 2024-06-28

## 2024-06-28 RX ORDER — AZELASTINE 1 MG/ML
2 SPRAY, METERED NASAL 2 TIMES DAILY
Qty: 30 ML | Refills: 5 | Status: SHIPPED | OUTPATIENT
Start: 2024-06-28

## 2024-06-28 RX ORDER — ERGOCALCIFEROL 1.25 MG/1
50000 CAPSULE ORAL
Qty: 13 CAPSULE | Refills: 1 | Status: SHIPPED | OUTPATIENT
Start: 2024-06-28 | End: 2024-07-01 | Stop reason: SDUPTHER

## 2024-06-28 NOTE — PROGRESS NOTES
The ABCs of the Annual Wellness Visit  Subsequent Medicare Wellness Visit    Subjective    Gaby Reyes is a 78 y.o. female who presents for a Subsequent Medicare Wellness Visit.    The following portions of the patient's history were reviewed and   updated as appropriate: allergies, current medications, past family history, past medical history, past social history, past surgical history, and problem list.    Compared to one year ago, the patient feels her physical   health is the same.    Compared to one year ago, the patient feels her mental   health is worse.    Recent Hospitalizations:  She was not admitted to the hospital during the last year.       Current Medical Providers:  Patient Care Team:  Ramiro Mccloud APRN as PCP - General (Nurse Practitioner)    Outpatient Medications Prior to Visit   Medication Sig Dispense Refill    EPINEPHrine (EPIPEN) 0.3 MG/0.3ML solution auto-injector injection       glucose blood test strip BID fasting and 2hr after largest meal E11.9 180 each 2    glucose monitor monitoring kit Use 1 each As Needed (BID). E11.9 BID fasting and 2hr after largest meal 1 each 0    Lancets 28G misc Use 1 Device 2 (Two) Times a Day. 180 each 1    potassium chloride (K-DUR,KLOR-CON) 20 MEQ CR tablet Take 1 tablet by mouth 2 (Two) Times a Day. 180 tablet 1    albuterol sulfate  (90 Base) MCG/ACT inhaler Inhale 2 puffs Every 6 (Six) Hours. 8.6 g 1    aspirin 81 MG chewable tablet Chew 1 tablet Daily. 90 tablet 1    azelastine (ASTELIN) 0.1 % nasal spray 2 sprays into the nostril(s) as directed by provider 2 (Two) Times a Day. Use in each nostril as directed 30 mL 5    Calcium Carbonate-Vitamin D 600-10 MG-MCG per tablet Take 1 tablet by mouth 2 (Two) Times a Day. 180 tablet 1    fexofenadine (ALLEGRA) 180 MG tablet Take 1 tablet by mouth Daily. 90 tablet 2    fluticasone (FLONASE) 50 MCG/ACT nasal spray 1 spray into the nostril(s) as directed by provider Daily. 1 mL 5     "fluticasone-salmeterol (Advair HFA) 230-21 MCG/ACT inhaler Inhale 2 puffs 2 (Two) Times a Day. 8 g 1    levalbuterol (XOPENEX HFA) 45 MCG/ACT inhaler Inhale 1-2 puffs Every 4 (Four) Hours As Needed for Wheezing. 15 g 11    metFORMIN (GLUCOPHAGE) 500 MG tablet Take 1 tablet by mouth Daily. 90 tablet 1    montelukast (SINGULAIR) 10 MG tablet Take 1 tablet by mouth Every Night. 90 tablet 1    pravastatin (PRAVACHOL) 10 MG tablet Take 1 tablet by mouth Daily. 90 tablet 1    vitamin D (ERGOCALCIFEROL) 1.25 MG (63978 UT) capsule capsule Take 1 capsule by mouth Every 7 (Seven) Days. 13 capsule 1    hydroCHLOROthiazide (HYDRODIURIL) 25 MG tablet Take 1 tablet by mouth Daily for 180 days. 90 tablet 1     No facility-administered medications prior to visit.       No opioid medication identified on active medication list. I have reviewed chart for other potential  high risk medication/s and harmful drug interactions in the elderly.        Aspirin is on active medication list. Aspirin use is indicated based on review of current medical condition/s. Pros and cons of this therapy have been discussed today. Benefits of this medication outweigh potential harm.  Patient has been encouraged to continue taking this medication.  .      Patient Active Problem List   Diagnosis    Hyperlipemia    Obesity     Advance Care Planning   Advance Care Planning     Advance Directive is on file.  ACP discussion was held with the patient during this visit. Patient has an advance directive in EMR which is still valid.      Objective    Vitals:    06/28/24 1359   BP: 140/88   BP Location: Right arm   Patient Position: Sitting   Cuff Size: Adult   Pulse: 95   Temp: 96.4 °F (35.8 °C)   TempSrc: Infrared   SpO2: 99%   Weight: 97.5 kg (215 lb)   Height: 170.2 cm (67\")   PainSc: 0-No pain     Estimated body mass index is 33.67 kg/m² as calculated from the following:    Height as of this encounter: 170.2 cm (67\").    Weight as of this encounter: 97.5 kg " (215 lb).    BMI is >= 30 and <35. (Class 1 Obesity). The following options were offered after discussion;: weight loss educational material (shared in after visit summary)      Does the patient have evidence of cognitive impairment? No    Lab Results   Component Value Date    TRIG 173 (H) 2024    HDL 40 2024     (H) 2024    VLDL 31 2024    HGBA1C 5.50 2024        HEALTH RISK ASSESSMENT    Smoking Status:  Social History     Tobacco Use   Smoking Status Never    Passive exposure: Never   Smokeless Tobacco Never     Alcohol Consumption:  Social History     Substance and Sexual Activity   Alcohol Use Not Currently     Fall Risk Screen:    STEADI Fall Risk Assessment was completed, and patient is at MODERATE risk for falls. Assessment completed on:2024    Depression Screenin/28/2024     2:01 PM   PHQ-2/PHQ-9 Depression Screening   Little Interest or Pleasure in Doing Things 0-->not at all   Feeling Down, Depressed or Hopeless 0-->not at all   PHQ-9: Brief Depression Severity Measure Score 0       Health Habits and Functional and Cognitive Screenin/28/2024     2:00 PM   Functional & Cognitive Status   Do you have difficulty preparing food and eating? No   Do you have difficulty bathing yourself, getting dressed or grooming yourself? No   Do you have difficulty using the toilet? No   Do you have difficulty moving around from place to place? No   Do you have trouble with steps or getting out of a bed or a chair? No   Current Diet Unhealthy Diet   Dental Exam Up to date   Eye Exam Up to date   Exercise (times per week) 0 times per week   Current Exercises Include No Regular Exercise   Do you need help using the phone?  No   Are you deaf or do you have serious difficulty hearing?  No   Do you need help to go to places out of walking distance? No   Do you need help shopping? No   Do you need help preparing meals?  No   Do you need help with housework?  No   Do you  need help with laundry? No   Do you need help taking your medications? No   Do you need help managing money? No   Do you ever drive or ride in a car without wearing a seat belt? No   Have you felt unusual stress, anger or loneliness in the last month? Yes   Who do you live with? Alone   If you need help, do you have trouble finding someone available to you? No   Have you been bothered in the last four weeks by sexual problems? No   Do you have difficulty concentrating, remembering or making decisions? No       Age-appropriate Screening Schedule:  Refer to the list below for future screening recommendations based on patient's age, sex and/or medical conditions. Orders for these recommended tests are listed in the plan section. The patient has been provided with a written plan.    Health Maintenance   Topic Date Due    BMI FOLLOWUP  01/20/2024    DXA SCAN  07/01/2024 (Originally 6/1/2024)    DIABETIC EYE EXAM  08/19/2024 (Originally 6/12/2024)    COVID-19 Vaccine (6 - 2023-24 season) 06/28/2025 (Originally 6/9/2024)    ZOSTER VACCINE (1 of 2) 06/28/2025 (Originally 12/10/1997)    TDAP/TD VACCINES (1 - Tdap) 07/01/2025 (Originally 12/10/1966)    INFLUENZA VACCINE  08/01/2024    HEMOGLOBIN A1C  12/28/2024    ANNUAL WELLNESS VISIT  06/28/2025    LIPID PANEL  06/28/2025    URINE MICROALBUMIN  06/28/2025    HEPATITIS C SCREENING  Completed    RSV Vaccine - Adults  Completed    Pneumococcal Vaccine 65+  Completed    COLORECTAL CANCER SCREENING  Discontinued                  CMS Preventative Services Quick Reference  Risk Factors Identified During Encounter  None Identified  The above risks/problems have been discussed with the patient.  Pertinent information has been shared with the patient in the After Visit Summary.  An After Visit Summary and PPPS were made available to the patient.    Follow Up:   Next Medicare Wellness visit to be scheduled in 1 year.       Additional E&M Note during same encounter follows:  Patient  has multiple medical problems which are significant and separately identifiable that require additional work above and beyond the Medicare Wellness Visit.      Chief Complaint  Follow-up (6m F/U)    Subjective        HPI  Gaby Reyes is also being seen today for   Diabetes  She presents for her follow-up diabetic visit. She has type 2 diabetes mellitus. Her disease course has been stable. Pertinent negatives for hypoglycemia include no headaches. Associated symptoms include fatigue. Pertinent negatives for diabetes include no polydipsia, no polyphagia, no polyuria and no visual change. There are no hypoglycemic complications. Symptoms are stable. There are no diabetic complications. Risk factors for coronary artery disease include diabetes mellitus, dyslipidemia, obesity, hypertension and post-menopausal. Current diabetic treatment includes oral agent (monotherapy). She is compliant with treatment most of the time. She is following a generally healthy diet. When asked about meal planning, she reported none. An ACE inhibitor/angiotensin II receptor blocker is being taken. She does not see a podiatrist.Eye exam is not current.   Hyperlipidemia  This is a chronic problem. The current episode started more than 1 year ago. The problem is controlled. Exacerbating diseases include diabetes and obesity. Pertinent negatives include no shortness of breath. Current antihyperlipidemic treatment includes statins. The current treatment provides significant improvement of lipids. There are no compliance problems.  Risk factors for coronary artery disease include diabetes mellitus, dyslipidemia, hypertension, obesity and post-menopausal.    Hypertension  This is a chronic problem. The current episode started more than 1 year ago. The problem is controlled. Pertinent negatives include no anxiety, headaches, palpitations, peripheral edema or shortness of breath. Risk factors for coronary artery disease include obesity,  "post-menopausal state, diabetes mellitus and dyslipidemia. Past treatments include direct vasodilators. Current antihypertension treatment includes diuretics. The current treatment provides significant improvement. There are no compliance problems.    Vitamin D  This is a chronic problem.  Current episode has been for longer than 6 months.  Problem has gradually been improving since she has been on vitamin D supplements.  She denies any excessive fatigue, hair loss, skin changes due to the vitamin D deficiency.  She tries to eat a well-balanced diet.  She has been on the vitamin D weekly, no compliance problems. Condition is stable.       Objective   Vital Signs:  /88 (BP Location: Right arm, Patient Position: Sitting, Cuff Size: Adult)   Pulse 95   Temp 96.4 °F (35.8 °C) (Infrared)   Ht 170.2 cm (67\")   Wt 97.5 kg (215 lb)   SpO2 99%   BMI 33.67 kg/m²     Physical Exam  Vitals reviewed.   Constitutional:       General: She is not in acute distress.     Appearance: Normal appearance. She is well-developed. She is obese. She is not ill-appearing.   HENT:      Head: Normocephalic and atraumatic.   Eyes:      Conjunctiva/sclera: Conjunctivae normal.      Pupils: Pupils are equal, round, and reactive to light.   Cardiovascular:      Rate and Rhythm: Normal rate and regular rhythm.      Heart sounds: No murmur heard.  Pulmonary:      Effort: Pulmonary effort is normal.      Breath sounds: Normal breath sounds. No wheezing.   Skin:     General: Skin is warm and dry.   Neurological:      Mental Status: She is alert and oriented to person, place, and time.   Psychiatric:         Mood and Affect: Mood and affect normal.         Behavior: Behavior normal.         Thought Content: Thought content normal.         Judgment: Judgment normal.          The following data was reviewed by: CALISTA Mejia on 06/28/2024:  Common labs          11/4/2023    11:22 12/28/2023    14:50 6/28/2024    14:47   Common " Labs   Glucose 116  84  98    BUN 14  17  15    Creatinine 0.58  0.68  0.73    Sodium 139  138  138    Potassium 3.6  3.4  3.3    Chloride 101  102  102    Calcium 9.5  9.6  9.2    Albumin  4.5  4.1    Total Bilirubin  0.4  0.4    Alkaline Phosphatase  83  78    AST (SGOT)  14  24    ALT (SGPT)  11  18    WBC  9.12  9.79    Hemoglobin  14.3  14.6    Hematocrit  43.5  43.6    Platelets  365  395    Total Cholesterol  154  187    Triglycerides  143  173    HDL Cholesterol  44  40    LDL Cholesterol   85  116    Hemoglobin A1C  6.00  5.50    Microalbumin, Urine   <1.2      Data reviewed : previous office note           Assessment and Plan   Diagnoses and all orders for this visit:    1. Encounter for subsequent annual wellness visit (AWV) in Medicare patient (Primary)    2. Class 1 obesity due to excess calories with serious comorbidity and body mass index (BMI) of 33.0 to 33.9 in adult    3. Vitamin D deficiency  -     Vitamin D,25-Hydroxy    4. Vitamin B12 deficiency  -     Vitamin B12 & Folate    5. Type 2 diabetes mellitus with other circulatory complication, without long-term current use of insulin  -     CBC Auto Differential  -     Comprehensive Metabolic Panel  -     Hemoglobin A1c  -     Lipid Panel  -     MicroAlbumin, Urine, Random - Urine, Clean Catch  -     TSH    6. Essential hypertension  -     CBC Auto Differential  -     Comprehensive Metabolic Panel    Other orders  -     albuterol sulfate  (90 Base) MCG/ACT inhaler; Inhale 2 puffs Every 6 (Six) Hours.  Dispense: 8.6 g; Refill: 1  -     aspirin 81 MG chewable tablet; Chew 1 tablet Daily.  Dispense: 90 tablet; Refill: 1  -     azelastine (ASTELIN) 0.1 % nasal spray; 2 sprays into the nostril(s) as directed by provider 2 (Two) Times a Day. Use in each nostril as directed  Dispense: 30 mL; Refill: 5  -     Calcium Carbonate-Vitamin D 600-10 MG-MCG per tablet; Take 1 tablet by mouth 2 (Two) Times a Day.  Dispense: 180 tablet; Refill: 1  -      fexofenadine (ALLEGRA) 180 MG tablet; Take 1 tablet by mouth Daily.  Dispense: 90 tablet; Refill: 2  -     fluticasone (FLONASE) 50 MCG/ACT nasal spray; 1 spray into the nostril(s) as directed by provider Daily.  Dispense: 1 mL; Refill: 5  -     fluticasone-salmeterol (Advair HFA) 230-21 MCG/ACT inhaler; Inhale 2 puffs 2 (Two) Times a Day.  Dispense: 8 g; Refill: 1  -     hydroCHLOROthiazide 25 MG tablet; Take 1 tablet by mouth Daily for 180 days.  Dispense: 90 tablet; Refill: 1  -     levalbuterol (XOPENEX HFA) 45 MCG/ACT inhaler; Inhale 1-2 puffs Every 4 (Four) Hours As Needed for Wheezing.  Dispense: 15 g; Refill: 11  -     metFORMIN (GLUCOPHAGE) 500 MG tablet; Take 1 tablet by mouth Daily.  Dispense: 90 tablet; Refill: 1  -     montelukast (SINGULAIR) 10 MG tablet; Take 1 tablet by mouth Every Night.  Dispense: 90 tablet; Refill: 1  -     pravastatin (PRAVACHOL) 10 MG tablet; Take 1 tablet by mouth Daily.  Dispense: 90 tablet; Refill: 1  -     vitamin D (ERGOCALCIFEROL) 1.25 MG (88862 UT) capsule capsule; Take 1 capsule by mouth Every 7 (Seven) Days.  Dispense: 13 capsule; Refill: 1         Patient's spouse passed away in April 2024.  Patient is doing okay, still having some issues with his dad due to sadness, she does have good family support and support of her friends.  She states family has been visiting and keeping her company.    Patient is due for lab work, will do lab work today, blood pressures a little elevated at 140/88 she denies any headache, chest pain or change in vision, discussed with patient to keep an eye on it at home.  Patient verbalized understanding agreeable treatment plan.    Follow Up   Return in about 6 months (around 12/28/2024).  Patient was given instructions and counseling regarding her condition or for health maintenance advice. Please see specific information pulled into the AVS if appropriate.

## 2024-07-01 RX ORDER — ERGOCALCIFEROL 1.25 MG/1
50000 CAPSULE ORAL
Qty: 13 CAPSULE | Refills: 1 | Status: SHIPPED | OUTPATIENT
Start: 2024-07-01

## 2024-07-01 RX ORDER — LANOLIN ALCOHOL/MO/W.PET/CERES
1000 CREAM (GRAM) TOPICAL DAILY
Qty: 90 TABLET | Refills: 1 | Status: SHIPPED | OUTPATIENT
Start: 2024-07-01

## 2024-07-12 ENCOUNTER — OFFICE VISIT (OUTPATIENT)
Dept: FAMILY MEDICINE CLINIC | Facility: CLINIC | Age: 79
End: 2024-07-12
Payer: MEDICARE

## 2024-07-12 VITALS
TEMPERATURE: 97.7 F | SYSTOLIC BLOOD PRESSURE: 134 MMHG | DIASTOLIC BLOOD PRESSURE: 80 MMHG | OXYGEN SATURATION: 97 % | BODY MASS INDEX: 33.93 KG/M2 | WEIGHT: 216.2 LBS | HEART RATE: 87 BPM | HEIGHT: 67 IN

## 2024-07-12 DIAGNOSIS — M54.42 ACUTE MIDLINE LOW BACK PAIN WITH LEFT-SIDED SCIATICA: Primary | ICD-10-CM

## 2024-07-12 PROCEDURE — 1126F AMNT PAIN NOTED NONE PRSNT: CPT

## 2024-07-12 PROCEDURE — 99214 OFFICE O/P EST MOD 30 MIN: CPT

## 2024-07-12 PROCEDURE — 96372 THER/PROPH/DIAG INJ SC/IM: CPT

## 2024-07-12 RX ORDER — TRIAMCINOLONE ACETONIDE 40 MG/ML
40 INJECTION, SUSPENSION INTRA-ARTICULAR; INTRAMUSCULAR ONCE
Status: COMPLETED | OUTPATIENT
Start: 2024-07-12 | End: 2024-07-12

## 2024-07-12 RX ORDER — KETOROLAC TROMETHAMINE 30 MG/ML
30 INJECTION, SOLUTION INTRAMUSCULAR; INTRAVENOUS ONCE
Status: COMPLETED | OUTPATIENT
Start: 2024-07-12 | End: 2024-07-12

## 2024-07-12 RX ADMIN — TRIAMCINOLONE ACETONIDE 40 MG: 40 INJECTION, SUSPENSION INTRA-ARTICULAR; INTRAMUSCULAR at 14:01

## 2024-07-12 RX ADMIN — KETOROLAC TROMETHAMINE 30 MG: 30 INJECTION, SOLUTION INTRAMUSCULAR; INTRAVENOUS at 14:00

## 2024-07-12 NOTE — PROGRESS NOTES
Chief Complaint  Chief Complaint   Patient presents with    Sciatica     Left sided sciatica since Sunday night, has been feeling better       Subjective      Gaby Reyes presents to [facility]  History of Present Illness  The patient presents for evaluation of left-sided low back pain.    She reports an improvement in her left-sided low back pain, which began after a fall while assisting a neighbor with picking blueberries.  She was seated on a garden bench and it gradually tipped over.  This occurred on 6/24/2024 she has a history of chronic back pain. However, she was symptom-free until Sunday afternoon when she was on her feet on a hard concrete floor and pulled and pushed a couple of heavy chairs. This incident led to severe pain that has disrupted her sleep. She has a history of bursitis, but she believes her current pain is different. On Sunday evening, Monday, and Tuesday, she experienced pain in her entire thigh, a symptom she had never encountered before. Despite having a high pain threshold, she experiences daily back pain, which is tolerable. She has been massaging her back and undergoing physical therapy, which have provided some relief. She also uses a heating pad and a hot shower, which provide some relief. She has also been using a pillow behind her back while sitting, which provides some relief. She has a history of arthritis in both knees and was previously on Mobic, which was effective for her arthritis, but caused stomach upset. She has been off Mobic for 2 years and is now taking Tylenol Arthritis, 2 tablets in the morning, which provides relief for 8 hours. She denies any history of gastric ulcers with bleeding. On Tuesday morning, her knee and leg gave out on her twice because of the pain.        Objective     Medical History:  Past Medical History:   Diagnosis Date    Allergic rhinitis 10/30/2014    Arthritis     Asthma     STOPPED IN 2003    Bronchitis      Chronic pain of right knee 01/29/2015    Deafness     Gall stones 1997    High blood pressure     High cholesterol     Hyperlipemia     Hyperlipidemia 02/08/2018    Hypertension     Impaired fasting glucose 04/28/2014    Osteopenia 11/2015    L1 LUMBAR SPINE    Pneumonia     Pressure ulcer, stage 1     Reflux esophagitis     Seasonal allergies     Sinus trouble     Type 2 diabetes mellitus without complication     Vertigo      Past Surgical History:   Procedure Laterality Date    COLONOSCOPY      GALLBLADDER SURGERY  1997    HYSTERECTOMY  1983    TONSILLECTOMY        Social History     Tobacco Use    Smoking status: Never     Passive exposure: Never    Smokeless tobacco: Never   Vaping Use    Vaping status: Never Used   Substance Use Topics    Alcohol use: Not Currently    Drug use: Never     Family History   Problem Relation Age of Onset    Cancer Mother     Diabetes type II Mother     Kidney cancer Mother     Heart disease Father 59        MI MASSIVE     Arthritis Father     Heart disease Brother 50    Diabetes type II Brother        Medications:  Prior to Admission medications    Medication Sig Start Date End Date Taking? Authorizing Provider   albuterol sulfate  (90 Base) MCG/ACT inhaler Inhale 2 puffs Every 6 (Six) Hours. 6/28/24  Yes Ramiro Mccloud APRN   aspirin 81 MG chewable tablet Chew 1 tablet Daily. 6/28/24  Yes Ramiro Mccloud APRN   azelastine (ASTELIN) 0.1 % nasal spray 2 sprays into the nostril(s) as directed by provider 2 (Two) Times a Day. Use in each nostril as directed 6/28/24  Yes Ramiro Mccloud APRN   Calcium Carbonate-Vitamin D 600-10 MG-MCG per tablet Take 1 tablet by mouth 2 (Two) Times a Day. 6/28/24  Yes Ramiro Mccloud APRN   EPINEPHrine (EPIPEN) 0.3 MG/0.3ML solution auto-injector injection  9/21/23  Yes Provider, MD Tyesha   fexofenadine (ALLEGRA) 180 MG tablet Take 1 tablet by mouth Daily. 6/28/24  Yes Ramiro Mccloud APRN   fluticasone  "(FLONASE) 50 MCG/ACT nasal spray 1 spray into the nostril(s) as directed by provider Daily. 6/28/24  Yes Ramiro Mccloud APRN   fluticasone-salmeterol (Advair HFA) 230-21 MCG/ACT inhaler Inhale 2 puffs 2 (Two) Times a Day. 6/28/24  Yes Ramiro Mccloud APRN   glucose blood test strip BID fasting and 2hr after largest meal E11.9 12/28/23  Yes Ramiro Mccloud APRN   glucose monitor monitoring kit Use 1 each As Needed (BID). E11.9 BID fasting and 2hr after largest meal 12/28/23 9/22/26 Yes Ramiro Mccloud APRN   hydroCHLOROthiazide 25 MG tablet Take 1 tablet by mouth Daily for 180 days. 6/28/24 12/25/24 Yes Ramiro Mccloud APRN   Lancets 28G misc Use 1 Device 2 (Two) Times a Day. 12/28/23  Yes Ramiro Mccloud APRN   levalbuterol (XOPENEX HFA) 45 MCG/ACT inhaler Inhale 1-2 puffs Every 4 (Four) Hours As Needed for Wheezing. 6/28/24  Yes Ramiro Mccloud APRN   metFORMIN (GLUCOPHAGE) 500 MG tablet Take 1 tablet by mouth Daily. 6/28/24  Yes Ramiro Mccloud APRN   montelukast (SINGULAIR) 10 MG tablet Take 1 tablet by mouth Every Night. 6/28/24  Yes Ramiro Mccloud APRN   potassium chloride (K-DUR,KLOR-CON) 20 MEQ CR tablet Take 1 tablet by mouth 2 (Two) Times a Day. 12/28/23  Yes Ramiro Mccloud APRN   pravastatin (PRAVACHOL) 10 MG tablet Take 1 tablet by mouth Daily. 6/28/24  Yes Ramiro Mccloud APRN   vitamin B-12 (CYANOCOBALAMIN) 1000 MCG tablet Take 1 tablet by mouth Daily. 7/1/24  Yes Ramiro Mccloud APRN   vitamin D (ERGOCALCIFEROL) 1.25 MG (70493 UT) capsule capsule Take 1 capsule by mouth Every 7 (Seven) Days. 7/1/24  Yes Ramiro Mccloud APRN        Allergies:   Patient has no known allergies.    Health Maintenance Due   Topic Date Due    DXA SCAN  06/01/2024         Vital Signs:   /80 (BP Location: Left arm, Patient Position: Sitting, Cuff Size: Adult)   Pulse 87   Temp 97.7 °F (36.5 °C) (Oral)   Ht 170.2 cm (67\")   " Wt 98.1 kg (216 lb 3.2 oz)   SpO2 97%   BMI 33.86 kg/m²     Wt Readings from Last 3 Encounters:   07/12/24 98.1 kg (216 lb 3.2 oz)   06/28/24 97.5 kg (215 lb)   12/28/23 91.8 kg (202 lb 6.4 oz)     BP Readings from Last 3 Encounters:   07/12/24 134/80   06/28/24 140/88   12/28/23 112/60         Physical Exam  Vitals reviewed.   Constitutional:       Appearance: Normal appearance. She is well-developed.   HENT:      Head: Normocephalic and atraumatic.   Eyes:      Conjunctiva/sclera: Conjunctivae normal.      Pupils: Pupils are equal, round, and reactive to light.   Cardiovascular:      Rate and Rhythm: Normal rate and regular rhythm.   Pulmonary:      Effort: Pulmonary effort is normal.      Breath sounds: Normal breath sounds.   Musculoskeletal:      Lumbar back: Spasms and tenderness present.      Left hip: Tenderness present.   Skin:     General: Skin is warm and dry.   Neurological:      Mental Status: She is alert and oriented to person, place, and time.      Cranial Nerves: No cranial nerve deficit.   Psychiatric:         Mood and Affect: Mood and affect normal.         Behavior: Behavior normal.         Thought Content: Thought content normal.         Judgment: Judgment normal.       Physical Exam        Result Review :    The following data was reviewed by CALISTA Cleary on 07/12/24 at 14:24 EDT:        No Images in the past 120 days found..    Results                 Assessment and Plan    Diagnoses and all orders for this visit:    1. Acute midline low back pain with left-sided sciatica (Primary)  -     diclofenac (VOLTAREN) 50 MG EC tablet; Take 1 tablet by mouth 2 (Two) Times a Day As Needed (back and hip pain).  Dispense: 28 tablet; Refill: 0  -     triamcinolone acetonide (KENALOG-40) injection 40 mg  -     ketorolac (TORADOL) injection 30 mg       Assessment & Plan  1. Left-sided low back pain.  Her kidney function is satisfactory, and there is no history of gastric ulcers with  bleeding. A Toradol and Kenalog injection will be administered in the office to reduce inflammation and sciatica pain. Diclofenac will be prescribed as an anti-inflammatory, to be used only as needed for pain and flareups. She is advised to continue massage, use of a pillow, heating pad, and a hot shower.          Follow Up   No follow-ups on file.  Patient was given instructions and counseling regarding her condition or for health maintenance advice. Please see specific information pulled into the AVS if appropriate.     Please note that portions of this note were completed with a voice recognition program.    Patient or patient representative verbalized consent for the use of Ambient Listening during the visit with  CALISTA Cleary for chart documentation. 7/12/2024  14:24 EDT

## 2024-08-15 RX ORDER — POTASSIUM CHLORIDE 20 MEQ/1
20 TABLET, EXTENDED RELEASE ORAL 2 TIMES DAILY
Qty: 180 TABLET | Refills: 1 | Status: SHIPPED | OUTPATIENT
Start: 2024-08-15

## 2024-08-15 NOTE — TELEPHONE ENCOUNTER
PT RECEIVED A NEW RX FOR POTASSIUM IN JULY BUT PHARMACY STILL HASN'T RECEIVED IT. PT STATED THAT SHE WAS ALREADY TAKING RX BUT PCP CHANGED HOW IT WAS TO BE TAKEN

## 2024-08-19 ENCOUNTER — OFFICE VISIT (OUTPATIENT)
Dept: CARDIOLOGY | Facility: CLINIC | Age: 79
End: 2024-08-19
Payer: MEDICARE

## 2024-08-19 VITALS
WEIGHT: 221 LBS | SYSTOLIC BLOOD PRESSURE: 138 MMHG | BODY MASS INDEX: 34.69 KG/M2 | HEART RATE: 97 BPM | DIASTOLIC BLOOD PRESSURE: 88 MMHG | HEIGHT: 67 IN

## 2024-08-19 DIAGNOSIS — E78.2 MIXED HYPERLIPIDEMIA: ICD-10-CM

## 2024-08-19 DIAGNOSIS — I35.0 AORTIC STENOSIS, MILD: ICD-10-CM

## 2024-08-19 DIAGNOSIS — E87.6 HYPOKALEMIA: ICD-10-CM

## 2024-08-19 DIAGNOSIS — I10 PRIMARY HYPERTENSION: Primary | ICD-10-CM

## 2024-08-19 PROBLEM — J40 BRONCHITIS: Status: ACTIVE | Noted: 2024-08-19

## 2024-08-19 PROBLEM — K80.20 GALL STONES: Status: ACTIVE | Noted: 2024-08-19

## 2024-08-19 PROBLEM — H91.90 DEAFNESS: Status: ACTIVE | Noted: 2024-08-19

## 2024-08-19 PROBLEM — J45.909 ASTHMA: Status: ACTIVE | Noted: 2024-08-19

## 2024-08-19 PROBLEM — J30.2 SEASONAL ALLERGIC RHINITIS: Status: ACTIVE | Noted: 2024-08-19

## 2024-08-19 PROBLEM — M19.90 ARTHRITIS: Status: ACTIVE | Noted: 2024-08-19

## 2024-08-19 PROBLEM — E11.9 TYPE 2 DIABETES MELLITUS WITHOUT COMPLICATION: Status: ACTIVE | Noted: 2024-08-19

## 2024-08-19 PROCEDURE — 99214 OFFICE O/P EST MOD 30 MIN: CPT | Performed by: NURSE PRACTITIONER

## 2024-08-19 PROCEDURE — 3079F DIAST BP 80-89 MM HG: CPT | Performed by: NURSE PRACTITIONER

## 2024-08-19 PROCEDURE — 1160F RVW MEDS BY RX/DR IN RCRD: CPT | Performed by: NURSE PRACTITIONER

## 2024-08-19 PROCEDURE — 3075F SYST BP GE 130 - 139MM HG: CPT | Performed by: NURSE PRACTITIONER

## 2024-08-19 PROCEDURE — 1159F MED LIST DOCD IN RCRD: CPT | Performed by: NURSE PRACTITIONER

## 2024-08-19 NOTE — PROGRESS NOTES
Chief Complaint  Follow-up    Subjective            History of Present Illness  Gaby Reyes is a 78-year-old female patient who presents to the office today for follow-up.  She has hypertension, hyperlipidemia, and mild aortic stenosis.  She is compliant with medication.  She denies any new or worsening cardiac symptoms today.    PMH  Past Medical History:   Diagnosis Date    Allergic rhinitis 10/30/2014    Arthritis     Asthma     STOPPED IN 2003    Bronchitis     Chronic pain of right knee 01/29/2015    Deafness     Gall stones 1997    High blood pressure     High cholesterol     Hyperlipemia     Hyperlipidemia 02/08/2018    Hypertension     Impaired fasting glucose 04/28/2014    Osteopenia 11/2015    L1 LUMBAR SPINE    Pneumonia     Pressure ulcer, stage 1     Reflux esophagitis     Seasonal allergies     Sinus trouble     Type 2 diabetes mellitus without complication     Vertigo          ALLERGY  No Known Allergies       SURGICALHX  Past Surgical History:   Procedure Laterality Date    COLONOSCOPY      GALLBLADDER SURGERY  1997    HYSTERECTOMY  1983    TONSILLECTOMY            SOC  Social History     Socioeconomic History    Marital status:    Tobacco Use    Smoking status: Never     Passive exposure: Never    Smokeless tobacco: Never   Vaping Use    Vaping status: Never Used   Substance and Sexual Activity    Alcohol use: Not Currently    Drug use: Never    Sexual activity: Defer         FAMHX  Family History   Problem Relation Age of Onset    Cancer Mother     Diabetes type II Mother     Kidney cancer Mother     Heart disease Father 59        MI MASSIVE     Arthritis Father     Heart disease Brother 50    Diabetes type II Brother           MEDSIGONLY  Current Outpatient Medications on File Prior to Visit   Medication Sig    albuterol sulfate  (90 Base) MCG/ACT inhaler Inhale 2 puffs Every 6 (Six) Hours.    aspirin 81 MG chewable tablet Chew 1 tablet Daily.    azelastine (ASTELIN) 0.1 % nasal  spray 2 sprays into the nostril(s) as directed by provider 2 (Two) Times a Day. Use in each nostril as directed    Calcium Carbonate-Vitamin D 600-10 MG-MCG per tablet Take 1 tablet by mouth 2 (Two) Times a Day.    EPINEPHrine (EPIPEN) 0.3 MG/0.3ML solution auto-injector injection     fexofenadine (ALLEGRA) 180 MG tablet Take 1 tablet by mouth Daily.    fluticasone (FLONASE) 50 MCG/ACT nasal spray 1 spray into the nostril(s) as directed by provider Daily.    fluticasone-salmeterol (Advair HFA) 230-21 MCG/ACT inhaler Inhale 2 puffs 2 (Two) Times a Day.    glucose blood test strip BID fasting and 2hr after largest meal E11.9    glucose monitor monitoring kit Use 1 each As Needed (BID). E11.9 BID fasting and 2hr after largest meal    hydroCHLOROthiazide 25 MG tablet Take 1 tablet by mouth Daily for 180 days.    Lancets 28G misc Use 1 Device 2 (Two) Times a Day.    levalbuterol (XOPENEX HFA) 45 MCG/ACT inhaler Inhale 1-2 puffs Every 4 (Four) Hours As Needed for Wheezing.    metFORMIN (GLUCOPHAGE) 500 MG tablet Take 1 tablet by mouth Daily.    montelukast (SINGULAIR) 10 MG tablet Take 1 tablet by mouth Every Night.    potassium chloride (KLOR-CON M20) 20 MEQ CR tablet Take 1 tablet by mouth 2 (Two) Times a Day. (Patient taking differently: Take 1 tablet by mouth 2 (Two) Times a Day. Patient states, she alternates 1-2 tablets per day.)    pravastatin (PRAVACHOL) 10 MG tablet Take 1 tablet by mouth Daily.    vitamin B-12 (CYANOCOBALAMIN) 1000 MCG tablet Take 1 tablet by mouth Daily.    vitamin D (ERGOCALCIFEROL) 1.25 MG (31733 UT) capsule capsule Take 1 capsule by mouth Every 7 (Seven) Days.    [DISCONTINUED] diclofenac (VOLTAREN) 50 MG EC tablet Take 1 tablet by mouth 2 (Two) Times a Day As Needed (back and hip pain). (Patient not taking: Reported on 8/19/2024)     No current facility-administered medications on file prior to visit.         Objective   /88 (BP Location: Left arm, Patient Position: Sitting, Cuff  "Size: Large Adult)   Pulse 97   Ht 170.2 cm (67\")   Wt 100 kg (221 lb)   BMI 34.61 kg/m²       Physical Exam  Constitutional:       Appearance: She is obese.   HENT:      Head: Normocephalic.   Neck:      Vascular: No carotid bruit.   Cardiovascular:      Rate and Rhythm: Normal rate and regular rhythm.      Pulses: Normal pulses.      Heart sounds: Normal heart sounds. Murmur heard.   Pulmonary:      Effort: Pulmonary effort is normal.      Breath sounds: Normal breath sounds.   Musculoskeletal:      Cervical back: Neck supple.      Right lower leg: No edema.      Left lower leg: No edema.   Skin:     General: Skin is dry.   Neurological:      Mental Status: She is alert and oriented to person, place, and time.   Psychiatric:         Behavior: Behavior normal.       Result Review :   The following data was reviewed by: CALISTA Villavicencio on 08/19/2024:  No results found for: \"PROBNP\"  CMP          6/28/2024    14:47   CMP   Glucose 98    BUN 15    Creatinine 0.73    EGFR 85.4    Sodium 138    Potassium 3.3    Chloride 102    Calcium 9.2    Total Protein 7.1    Albumin 4.1    Globulin 3.0    Total Bilirubin 0.4    Alkaline Phosphatase 78    AST (SGOT) 24    ALT (SGPT) 18    Albumin/Globulin Ratio 1.4    BUN/Creatinine Ratio 20.5    Anion Gap 13.4      CBC w/diff          6/28/2024    14:47   CBC w/Diff   WBC 9.79    RBC 5.12    Hemoglobin 14.6    Hematocrit 43.6    MCV 85.2    MCH 28.5    MCHC 33.5    RDW 13.1    Platelets 395    Neutrophil Rel % 60.4    Immature Granulocyte Rel % 0.3    Lymphocyte Rel % 32.3    Monocyte Rel % 5.6    Eosinophil Rel % 0.8    Basophil Rel % 0.6       Lab Results   Component Value Date    TSH 1.850 06/28/2024      Lab Results   Component Value Date    FREET4 1.3 12/09/2020      No results found for: \"DDIMERQUANT\"  Magnesium   Date Value Ref Range Status   06/28/2023 2.0 1.6 - 2.4 mg/dL Final      No results found for: \"DIGOXIN\"   No results found for: \"TROPONINT\"        Lipid " Panel          6/28/2024    14:47   Lipid Panel   Total Cholesterol 187    Triglycerides 173    HDL Cholesterol 40    VLDL Cholesterol 31    LDL Cholesterol  116    LDL/HDL Ratio 2.81        Results for orders placed during the hospital encounter of 02/14/24    Adult Transthoracic Echo Complete W/ Cont if Necessary Per Protocol    Interpretation Summary  Fibrocalcific mitral and aortic valves.  Normal left ventricular systolic function.  Mild aortic stenosis.  Trace MR and trace TR.           Assessment and Plan    Diagnoses and all orders for this visit:    1. Primary hypertension (Primary)  Currently controlled and without adverse effects from medication, continue hydrochlorothiazide 25 mg daily.    2. Mixed hyperlipidemia  Last lipid panel was 6/28/2024 with LDL under 16 which is above goal range.  She is tolerating pravastatin 10 mg daily right now.  If LDL remains above 103 months then would recommend increasing pravastatin dose.    3. Aortic stenosis, mild  Asymptomatic, continue to monitor with repeat echocardiogram.    4. Hypokalemia  Recent blood work shows low potassium level.  She is currently alternating between 10 and 20 mill equivalent potassium chloride twice daily.  Recheck BMP and magnesium level to assess electrolytes to see if this dose needs to be adjusted.  -     Basic Metabolic Panel; Future  -     Magnesium; Future            Follow Up   Return in about 1 year (around 8/19/2025) for Follow up with Dr Brambila.    Patient was given instructions and counseling regarding her condition or for health maintenance advice. Please see specific information pulled into the AVS if appropriate.     Gaby Reyes  reports that she has never smoked. She has never been exposed to tobacco smoke. She has never used smokeless tobacco.          CALISTA Villavicencio  08/19/24  14:07 EDT    Dictated Utilizing Dragon Dictation

## 2024-08-21 ENCOUNTER — LAB (OUTPATIENT)
Dept: LAB | Facility: HOSPITAL | Age: 79
End: 2024-08-21
Payer: MEDICARE

## 2024-08-21 DIAGNOSIS — E87.6 HYPOKALEMIA: ICD-10-CM

## 2024-08-21 LAB
ANION GAP SERPL CALCULATED.3IONS-SCNC: 11.3 MMOL/L (ref 5–15)
BUN SERPL-MCNC: 14 MG/DL (ref 8–23)
BUN/CREAT SERPL: 21.5 (ref 7–25)
CALCIUM SPEC-SCNC: 9.6 MG/DL (ref 8.6–10.5)
CHLORIDE SERPL-SCNC: 103 MMOL/L (ref 98–107)
CO2 SERPL-SCNC: 26.7 MMOL/L (ref 22–29)
CREAT SERPL-MCNC: 0.65 MG/DL (ref 0.57–1)
EGFRCR SERPLBLD CKD-EPI 2021: 91.4 ML/MIN/1.73
GLUCOSE SERPL-MCNC: 95 MG/DL (ref 65–99)
MAGNESIUM SERPL-MCNC: 2.2 MG/DL (ref 1.6–2.4)
POTASSIUM SERPL-SCNC: 3.9 MMOL/L (ref 3.5–5.2)
SODIUM SERPL-SCNC: 141 MMOL/L (ref 136–145)

## 2024-08-21 PROCEDURE — 83735 ASSAY OF MAGNESIUM: CPT

## 2024-08-21 PROCEDURE — 36415 COLL VENOUS BLD VENIPUNCTURE: CPT

## 2024-08-21 PROCEDURE — 80048 BASIC METABOLIC PNL TOTAL CA: CPT

## 2024-08-22 ENCOUNTER — TELEPHONE (OUTPATIENT)
Dept: CARDIOLOGY | Facility: CLINIC | Age: 79
End: 2024-08-22
Payer: MEDICARE

## 2024-08-22 NOTE — TELEPHONE ENCOUNTER
----- Message from Елена Rosado sent at 8/22/2024  7:43 AM EDT -----  Renal function and electrolytes are good, continue current meds

## 2025-01-06 DIAGNOSIS — E11.59 TYPE 2 DIABETES MELLITUS WITH OTHER CIRCULATORY COMPLICATION, WITHOUT LONG-TERM CURRENT USE OF INSULIN: ICD-10-CM

## 2025-01-06 RX ORDER — POTASSIUM CHLORIDE 1500 MG/1
20 TABLET, EXTENDED RELEASE ORAL 2 TIMES DAILY
Qty: 180 TABLET | Refills: 1 | Status: SHIPPED | OUTPATIENT
Start: 2025-01-06

## 2025-01-06 RX ORDER — AZELASTINE 1 MG/ML
2 SPRAY, METERED NASAL 2 TIMES DAILY
Qty: 30 ML | Refills: 5 | Status: SHIPPED | OUTPATIENT
Start: 2025-01-06

## 2025-01-06 RX ORDER — BLOOD-GLUCOSE METER
1 KIT MISCELLANEOUS AS NEEDED
Qty: 1 EACH | Refills: 0 | Status: SHIPPED | OUTPATIENT
Start: 2025-01-06 | End: 2027-10-02

## 2025-01-06 RX ORDER — FLUTICASONE PROPIONATE AND SALMETEROL XINAFOATE 230; 21 UG/1; UG/1
2 AEROSOL, METERED RESPIRATORY (INHALATION) 2 TIMES DAILY
Qty: 8 G | Refills: 1 | Status: SHIPPED | OUTPATIENT
Start: 2025-01-06

## 2025-01-06 RX ORDER — FLUTICASONE PROPIONATE 50 MCG
1 SPRAY, SUSPENSION (ML) NASAL DAILY
Qty: 16 G | Refills: 3 | Status: SHIPPED | OUTPATIENT
Start: 2025-01-06

## 2025-01-06 RX ORDER — LANCETS 23 GAUGE
1 EACH MISCELLANEOUS 2 TIMES DAILY
Qty: 180 EACH | Refills: 1 | Status: SHIPPED | OUTPATIENT
Start: 2025-01-06

## 2025-01-06 RX ORDER — ASPIRIN 81 MG/1
81 TABLET, CHEWABLE ORAL DAILY
Qty: 90 TABLET | Refills: 1 | Status: SHIPPED | OUTPATIENT
Start: 2025-01-06

## 2025-01-06 RX ORDER — PRAVASTATIN SODIUM 10 MG
10 TABLET ORAL DAILY
Qty: 90 TABLET | Refills: 1 | Status: SHIPPED | OUTPATIENT
Start: 2025-01-06

## 2025-01-06 RX ORDER — LANOLIN ALCOHOL/MO/W.PET/CERES
1000 CREAM (GRAM) TOPICAL DAILY
Qty: 90 TABLET | Refills: 1 | Status: SHIPPED | OUTPATIENT
Start: 2025-01-06

## 2025-01-06 RX ORDER — ERGOCALCIFEROL 1.25 MG/1
50000 CAPSULE, LIQUID FILLED ORAL
Qty: 13 CAPSULE | Refills: 1 | Status: SHIPPED | OUTPATIENT
Start: 2025-01-06

## 2025-01-06 RX ORDER — MONTELUKAST SODIUM 10 MG/1
10 TABLET ORAL NIGHTLY
Qty: 90 TABLET | Refills: 1 | Status: SHIPPED | OUTPATIENT
Start: 2025-01-06

## 2025-01-06 RX ORDER — ALBUTEROL SULFATE 90 UG/1
2 INHALANT RESPIRATORY (INHALATION) EVERY 6 HOURS
Qty: 8.6 G | Refills: 1 | Status: SHIPPED | OUTPATIENT
Start: 2025-01-06

## 2025-01-06 RX ORDER — FEXOFENADINE HCL 180 MG/1
180 TABLET ORAL DAILY
Qty: 90 TABLET | Refills: 2 | Status: SHIPPED | OUTPATIENT
Start: 2025-01-06

## 2025-01-06 RX ORDER — HYDROCHLOROTHIAZIDE 25 MG/1
25 TABLET ORAL DAILY
Qty: 90 TABLET | Refills: 1 | Status: SHIPPED | OUTPATIENT
Start: 2025-01-06 | End: 2025-01-08 | Stop reason: SDUPTHER

## 2025-01-06 RX ORDER — LEVALBUTEROL TARTRATE 45 UG/1
1-2 AEROSOL, METERED ORAL EVERY 4 HOURS PRN
Qty: 15 G | Refills: 11 | Status: SHIPPED | OUTPATIENT
Start: 2025-01-06

## 2025-01-08 ENCOUNTER — TELEPHONE (OUTPATIENT)
Dept: FAMILY MEDICINE CLINIC | Facility: CLINIC | Age: 80
End: 2025-01-08
Payer: MEDICARE

## 2025-01-08 RX ORDER — HYDROCHLOROTHIAZIDE 25 MG/1
25 TABLET ORAL DAILY
Qty: 90 TABLET | Refills: 1 | Status: SHIPPED | OUTPATIENT
Start: 2025-01-08 | End: 2025-07-07

## 2025-01-08 NOTE — TELEPHONE ENCOUNTER
Spoke with Patient to inform her that prescription was sent to [facility]. Patient ended up getting prescription from [facility] anyways so she will call [facility] and cancel refill there.

## 2025-01-08 NOTE — TELEPHONE ENCOUNTER
Caller: Gaby Reyes    Relationship: Self    Best call back number:     [phone]       What medication are you requesting: ONE WEEK SUPPLY OF hydroCHLOROthiazide 25 MG tablet     Have you had these symptoms before:    [] Yes  [] No    Have you been treated for these symptoms before:   [] Yes  [] No    If a prescription is needed, what is your preferred pharmacy and phone number: [id number] [place] [facility] [address] AT [facility] [phone] [facility] [phone]      Additional notes: PATIENT STATES THAT DUE TO THE SNOW AND PHARMACY CLOSURES SHE IS UNABLE TO  THIS PRESCRIPTION AT [facility] AT THIS TIME        PATIENT WOULD LIKE A CALL WHEN THIS IS SENT, SHE SAYS [facility] WILL NOT CALL HER

## 2025-02-10 ENCOUNTER — OFFICE VISIT (OUTPATIENT)
Dept: FAMILY MEDICINE CLINIC | Facility: CLINIC | Age: 80
End: 2025-02-10
Payer: MEDICARE

## 2025-02-10 VITALS
HEART RATE: 93 BPM | TEMPERATURE: 97.8 F | BODY MASS INDEX: 33.45 KG/M2 | WEIGHT: 213.1 LBS | OXYGEN SATURATION: 99 % | DIASTOLIC BLOOD PRESSURE: 66 MMHG | SYSTOLIC BLOOD PRESSURE: 122 MMHG | HEIGHT: 67 IN

## 2025-02-10 DIAGNOSIS — E66.09 CLASS 1 OBESITY DUE TO EXCESS CALORIES WITH SERIOUS COMORBIDITY AND BODY MASS INDEX (BMI) OF 33.0 TO 33.9 IN ADULT: ICD-10-CM

## 2025-02-10 DIAGNOSIS — E53.8 VITAMIN B12 DEFICIENCY: ICD-10-CM

## 2025-02-10 DIAGNOSIS — E55.9 VITAMIN D DEFICIENCY: ICD-10-CM

## 2025-02-10 DIAGNOSIS — Z13.29 THYROID DISORDER SCREENING: ICD-10-CM

## 2025-02-10 DIAGNOSIS — E11.59 TYPE 2 DIABETES MELLITUS WITH OTHER CIRCULATORY COMPLICATION, WITHOUT LONG-TERM CURRENT USE OF INSULIN: ICD-10-CM

## 2025-02-10 DIAGNOSIS — I10 ESSENTIAL HYPERTENSION: ICD-10-CM

## 2025-02-10 DIAGNOSIS — Z09 FOLLOW-UP EXAM: Primary | ICD-10-CM

## 2025-02-10 DIAGNOSIS — E66.811 CLASS 1 OBESITY DUE TO EXCESS CALORIES WITH SERIOUS COMORBIDITY AND BODY MASS INDEX (BMI) OF 33.0 TO 33.9 IN ADULT: ICD-10-CM

## 2025-02-10 LAB
ALBUMIN SERPL-MCNC: 4.3 G/DL (ref 3.5–5.2)
ALBUMIN/GLOB SERPL: 1.3 G/DL
ALP SERPL-CCNC: 81 U/L (ref 39–117)
ALT SERPL W P-5'-P-CCNC: 11 U/L (ref 1–33)
ANION GAP SERPL CALCULATED.3IONS-SCNC: 13 MMOL/L (ref 5–15)
AST SERPL-CCNC: 14 U/L (ref 1–32)
BASOPHILS # BLD AUTO: 0.08 10*3/MM3 (ref 0–0.2)
BASOPHILS NFR BLD AUTO: 0.6 % (ref 0–1.5)
BILIRUB SERPL-MCNC: 0.3 MG/DL (ref 0–1.2)
BUN SERPL-MCNC: 25 MG/DL (ref 8–23)
BUN/CREAT SERPL: 37.3 (ref 7–25)
CALCIUM SPEC-SCNC: 10 MG/DL (ref 8.6–10.5)
CHLORIDE SERPL-SCNC: 101 MMOL/L (ref 98–107)
CHOLEST SERPL-MCNC: 169 MG/DL (ref 0–200)
CO2 SERPL-SCNC: 25 MMOL/L (ref 22–29)
CREAT SERPL-MCNC: 0.67 MG/DL (ref 0.57–1)
DEPRECATED RDW RBC AUTO: 37.3 FL (ref 37–54)
EGFRCR SERPLBLD CKD-EPI 2021: 90.2 ML/MIN/1.73
EOSINOPHIL # BLD AUTO: 0.09 10*3/MM3 (ref 0–0.4)
EOSINOPHIL NFR BLD AUTO: 0.7 % (ref 0.3–6.2)
ERYTHROCYTE [DISTWIDTH] IN BLOOD BY AUTOMATED COUNT: 12.3 % (ref 12.3–15.4)
GLOBULIN UR ELPH-MCNC: 3.4 GM/DL
GLUCOSE SERPL-MCNC: 114 MG/DL (ref 65–99)
HBA1C MFR BLD: 5.4 % (ref 4.8–5.6)
HCT VFR BLD AUTO: 46.3 % (ref 34–46.6)
HDLC SERPL-MCNC: 42 MG/DL (ref 40–60)
HGB BLD-MCNC: 16.1 G/DL (ref 12–15.9)
IMM GRANULOCYTES # BLD AUTO: 0.04 10*3/MM3 (ref 0–0.05)
IMM GRANULOCYTES NFR BLD AUTO: 0.3 % (ref 0–0.5)
LDLC SERPL CALC-MCNC: 98 MG/DL (ref 0–100)
LDLC/HDLC SERPL: 2.23 {RATIO}
LYMPHOCYTES # BLD AUTO: 3.78 10*3/MM3 (ref 0.7–3.1)
LYMPHOCYTES NFR BLD AUTO: 28.5 % (ref 19.6–45.3)
MCH RBC QN AUTO: 29.5 PG (ref 26.6–33)
MCHC RBC AUTO-ENTMCNC: 34.8 G/DL (ref 31.5–35.7)
MCV RBC AUTO: 85 FL (ref 79–97)
MONOCYTES # BLD AUTO: 0.79 10*3/MM3 (ref 0.1–0.9)
MONOCYTES NFR BLD AUTO: 6 % (ref 5–12)
NEUTROPHILS NFR BLD AUTO: 63.9 % (ref 42.7–76)
NEUTROPHILS NFR BLD AUTO: 8.46 10*3/MM3 (ref 1.7–7)
NRBC BLD AUTO-RTO: 0 /100 WBC (ref 0–0.2)
PLATELET # BLD AUTO: 409 10*3/MM3 (ref 140–450)
PMV BLD AUTO: 11.5 FL (ref 6–12)
POTASSIUM SERPL-SCNC: 3.9 MMOL/L (ref 3.5–5.2)
PROT SERPL-MCNC: 7.7 G/DL (ref 6–8.5)
RBC # BLD AUTO: 5.45 10*6/MM3 (ref 3.77–5.28)
SODIUM SERPL-SCNC: 139 MMOL/L (ref 136–145)
TRIGL SERPL-MCNC: 166 MG/DL (ref 0–150)
TSH SERPL DL<=0.05 MIU/L-ACNC: 2.45 UIU/ML (ref 0.27–4.2)
VLDLC SERPL-MCNC: 29 MG/DL (ref 5–40)
WBC NRBC COR # BLD AUTO: 13.24 10*3/MM3 (ref 3.4–10.8)

## 2025-02-10 PROCEDURE — 1160F RVW MEDS BY RX/DR IN RCRD: CPT | Performed by: NURSE PRACTITIONER

## 2025-02-10 PROCEDURE — 83036 HEMOGLOBIN GLYCOSYLATED A1C: CPT | Performed by: NURSE PRACTITIONER

## 2025-02-10 PROCEDURE — 82746 ASSAY OF FOLIC ACID SERUM: CPT | Performed by: NURSE PRACTITIONER

## 2025-02-10 PROCEDURE — 82607 VITAMIN B-12: CPT | Performed by: NURSE PRACTITIONER

## 2025-02-10 PROCEDURE — 3078F DIAST BP <80 MM HG: CPT | Performed by: NURSE PRACTITIONER

## 2025-02-10 PROCEDURE — 1159F MED LIST DOCD IN RCRD: CPT | Performed by: NURSE PRACTITIONER

## 2025-02-10 PROCEDURE — 1126F AMNT PAIN NOTED NONE PRSNT: CPT | Performed by: NURSE PRACTITIONER

## 2025-02-10 PROCEDURE — 82043 UR ALBUMIN QUANTITATIVE: CPT | Performed by: NURSE PRACTITIONER

## 2025-02-10 PROCEDURE — 99214 OFFICE O/P EST MOD 30 MIN: CPT | Performed by: NURSE PRACTITIONER

## 2025-02-10 PROCEDURE — 3074F SYST BP LT 130 MM HG: CPT | Performed by: NURSE PRACTITIONER

## 2025-02-10 PROCEDURE — 85025 COMPLETE CBC W/AUTO DIFF WBC: CPT | Performed by: NURSE PRACTITIONER

## 2025-02-10 PROCEDURE — 82306 VITAMIN D 25 HYDROXY: CPT | Performed by: NURSE PRACTITIONER

## 2025-02-10 PROCEDURE — 80053 COMPREHEN METABOLIC PANEL: CPT | Performed by: NURSE PRACTITIONER

## 2025-02-10 PROCEDURE — 80061 LIPID PANEL: CPT | Performed by: NURSE PRACTITIONER

## 2025-02-10 PROCEDURE — 36415 COLL VENOUS BLD VENIPUNCTURE: CPT | Performed by: NURSE PRACTITIONER

## 2025-02-10 PROCEDURE — 84443 ASSAY THYROID STIM HORMONE: CPT | Performed by: NURSE PRACTITIONER

## 2025-02-10 NOTE — PROGRESS NOTES
Chief Complaint  Diabetes (6 month f/u )    Subjective        Medical History: has a past medical history of Allergic rhinitis (10/30/2014), Arthritis, Asthma, Bronchitis, Chronic pain of right knee (01/29/2015), Deafness, Gall stones (1997), High blood pressure, High cholesterol, Hyperlipemia, Hyperlipidemia (02/08/2018), Hypertension, Impaired fasting glucose (04/28/2014), Osteopenia (11/2015), Pneumonia, Pressure ulcer, stage 1, Reflux esophagitis, Seasonal allergies, Sinus trouble, Type 2 diabetes mellitus without complication, and Vertigo.     Surgical History: has a past surgical history that includes Colonoscopy; Gallbladder surgery (1997); Hysterectomy (1983); and Tonsillectomy.     Family History: family history includes Arthritis in her father; Cancer in her mother; Diabetes type II in her brother and mother; Heart disease (age of onset: 50) in her brother; Heart disease (age of onset: 59) in her father; Kidney cancer in her mother.     Social History: reports that she has never smoked. She has never been exposed to tobacco smoke. She has never used smokeless tobacco. She reports that she does not currently use alcohol. She reports that she does not use drugs.    Gaby Reyes presents to [facility]    Diabetes        PHQ-9 Depression Screening  Little interest or pleasure in doing things? Almost all   Feeling down, depressed, or hopeless? Almost all   PHQ-2 Total Score 6   Trouble falling or staying asleep, or sleeping too much? Almost all   Feeling tired or having little energy? Almost all   Poor appetite or overeating? Over half   Feeling bad about yourself - or that you are a failure or have let yourself or your family down? Almost all   Trouble concentrating on things, such as reading the newspaper or watching television? Not at all   Moving or speaking so slowly that other people could have noticed? Or the opposite - being so fidgety or restless that you have been  moving around a lot more than usual? Not at all   Thoughts that you would be better off dead, or of hurting yourself in some way? Not at all   PHQ-9 Total Score 17   If you checked off any problems, how difficult have these problems made it for you to do your work, take care of things at home, or get along with other people? Somewhat difficult         CASSIDY-7     Diabetes  She presents for her follow-up diabetic visit. She has type 2 diabetes mellitus. Her disease course has been stable. Pertinent negatives for hypoglycemia include no headaches. Associated symptoms include fatigue. Pertinent negatives for diabetes include no polydipsia, no polyphagia, no polyuria and no visual change. There are no hypoglycemic complications. Symptoms are stable. There are no diabetic complications. Risk factors for coronary artery disease include diabetes mellitus, dyslipidemia, obesity, hypertension and post-menopausal. Current diabetic treatment includes oral agent (monotherapy). She is compliant with treatment most of the time. She is following a generally healthy diet. When asked about meal planning, she reported none. An ACE inhibitor/angiotensin II receptor blocker is being taken. She does not see a podiatrist.Eye exam is not current.   Hyperlipidemia  This is a chronic problem. The current episode started more than 1 year ago. The problem is controlled. Exacerbating diseases include diabetes and obesity. Pertinent negatives include no shortness of breath. Current antihyperlipidemic treatment includes statins. The current treatment provides significant improvement of lipids. There are no compliance problems.  Risk factors for coronary artery disease include diabetes mellitus, dyslipidemia, hypertension, obesity and post-menopausal.    Hypertension  This is a chronic problem. The current episode started more than 1 year ago. The problem is controlled. Pertinent negatives include no anxiety, headaches, palpitations, peripheral  edema or shortness of breath. Risk factors for coronary artery disease include obesity, post-menopausal state, diabetes mellitus and dyslipidemia. Past treatments include direct vasodilators. Current antihypertension treatment includes diuretics. The current treatment provides significant improvement. There are no compliance problems.    Vitamin D  This is a chronic problem.  Current episode has been for longer than 6 months.  Problem has gradually been improving since she has been on vitamin D supplements.  She denies any excessive fatigue, hair loss, skin changes due to the vitamin D deficiency.  She tries to eat a well-balanced diet.  She has been on the vitamin D weekly, no compliance problems. Condition is stable.     History of Present Illness  The patient presents for evaluation of hypertension, depression, fatigue, back pain, foot pain, cataracts, and fall.    She has been experiencing elevated blood pressure since the spring and summer of 2024, with a notable increase during her visit in July 2024. She attributes this rise to the death of her  in April 2024. Her blood pressure subsequently decreased but has recently spiked again. She is not currently on any medication for her blood pressure.    She reports a high depression score, which she believes is due to significant personal losses. She acknowledges the need for self-care and social interaction but admits to limited activities outside the home. She managed to cope through the [place] season without decorations, a decision she made independently. She plans to relocate to [place] to live with her sister, a move she anticipates will be beneficial. However, she expresses concern about her cardiac health, reporting feelings of malaise and exhaustion even with minimal exertion, such as walking from her kitchen to her living room. She also experiences palpitations. She attends [employer] weekly and participates in Sunday school. She maintains regular  "contact with her sister and other family members. She engages in vincenzo and reading as hobbies.    She reports feeling unwell and is concerned about her heart. She experiences exhaustion with minimal movement, such as walking from the kitchen to the living room, and notes that her heart flutters.    She also reports back pain and foot pain.    She has an appointment on 02/24/2025 with Dr. Corrales in [place] about cataracts, which are bothering her. She can not see very well and has been clumsy. She thinks her vision had something to do with her fall in November 2024.    She fell flat on her back in November 2024. She did not hit her head, but her shoulders caught her heel. There was a crack in the sidewalk, and she caught her heel when she turned around to check on her friend who was on a walker behind her. She thinks her vision had something to do with it.      Objective   Vital Signs:   /66   Pulse 93   Temp 97.8 °F (36.6 °C)   Ht 170.2 cm (67\")   Wt 96.7 kg (213 lb 1.6 oz)   SpO2 99%   BMI 33.38 kg/m²       Wt Readings from Last 3 Encounters:   02/10/25 96.7 kg (213 lb 1.6 oz)   08/19/24 100 kg (221 lb)   07/12/24 98.1 kg (216 lb 3.2 oz)        BP Readings from Last 3 Encounters:   02/10/25 122/66   08/19/24 138/88   07/12/24 134/80                Physical Exam  Vitals reviewed.   Constitutional:       General: She is not in acute distress.     Appearance: Normal appearance. She is well-developed. She is obese. She is not ill-appearing.   HENT:      Head: Normocephalic and atraumatic.      Right Ear: Tympanic membrane normal. There is no impacted cerumen.      Left Ear: Tympanic membrane normal. There is no impacted cerumen.   Cardiovascular:      Rate and Rhythm: Normal rate and regular rhythm.      Pulses: Normal pulses.      Heart sounds: Normal heart sounds. No murmur heard.  Pulmonary:      Effort: Pulmonary effort is normal. No respiratory distress.      Breath sounds: No wheezing. "   Musculoskeletal:      Right lower leg: No edema.      Left lower leg: No edema.   Skin:     General: Skin is warm and dry.   Neurological:      Mental Status: She is alert and oriented to person, place, and time.   Psychiatric:         Mood and Affect: Mood and affect normal.         Behavior: Behavior normal.         Thought Content: Thought content normal.         Judgment: Judgment normal.        Result Review :  {The following data was reviewed by CALISTA Mejia on 02/10/2025.  Common labs          6/28/2024    14:47 8/21/2024    09:56   Common Labs   Glucose 98  95    BUN 15  14    Creatinine 0.73  0.65    Sodium 138  141    Potassium 3.3  3.9    Chloride 102  103    Calcium 9.2  9.6    Albumin 4.1     Total Bilirubin 0.4     Alkaline Phosphatase 78     AST (SGOT) 24     ALT (SGPT) 18     WBC 9.79     Hemoglobin 14.6     Hematocrit 43.6     Platelets 395     Total Cholesterol 187     Triglycerides 173     HDL Cholesterol 40     LDL Cholesterol  116     Hemoglobin A1C 5.50     Microalbumin, Urine <1.2       Data reviewed; previous office note           Current Outpatient Medications on File Prior to Visit   Medication Sig Dispense Refill    albuterol sulfate  (90 Base) MCG/ACT inhaler Inhale 2 puffs Every 6 (Six) Hours. 8.6 g 1    aspirin 81 MG chewable tablet Chew 1 tablet Daily. 90 tablet 1    azelastine (ASTELIN) 0.1 % nasal spray Administer 2 sprays into the nostril(s) as directed by provider 2 (Two) Times a Day. Use in each nostril as directed 30 mL 5    Calcium Carbonate-Vitamin D 600-10 MG-MCG per tablet Take 1 tablet by mouth 2 (Two) Times a Day. 180 tablet 1    EPINEPHrine (EPIPEN) 0.3 MG/0.3ML solution auto-injector injection       fexofenadine (ALLEGRA) 180 MG tablet Take 1 tablet by mouth Daily. 90 tablet 2    fluticasone (FLONASE) 50 MCG/ACT nasal spray Administer 1 spray into the nostril(s) as directed by provider Daily. 16 g 3    fluticasone-salmeterol (Advair HFA) 230-21  MCG/ACT inhaler Inhale 2 puffs 2 (Two) Times a Day. 8 g 1    glucose blood test strip BID fasting and 2hr after largest meal E11.9 180 each 2    glucose monitor monitoring kit Use 1 each As Needed (BID). E11.9 BID fasting and 2hr after largest meal 1 each 0    hydroCHLOROthiazide 25 MG tablet Take 1 tablet by mouth Daily for 180 days. 90 tablet 1    Lancets 28G misc Use 1 Device 2 (Two) Times a Day. 180 each 1    levalbuterol (XOPENEX HFA) 45 MCG/ACT inhaler Inhale 1-2 puffs Every 4 (Four) Hours As Needed for Wheezing. 15 g 11    metFORMIN (GLUCOPHAGE) 500 MG tablet Take 1 tablet by mouth Daily. 90 tablet 1    montelukast (SINGULAIR) 10 MG tablet Take 1 tablet by mouth Every Night. 90 tablet 1    potassium chloride (KLOR-CON M20) 20 MEQ CR tablet Take 1 tablet by mouth 2 (Two) Times a Day. 180 tablet 1    pravastatin (PRAVACHOL) 10 MG tablet Take 1 tablet by mouth Daily. 90 tablet 1    vitamin B-12 (CYANOCOBALAMIN) 1000 MCG tablet Take 1 tablet by mouth Daily. 90 tablet 1    vitamin D (ERGOCALCIFEROL) 1.25 MG (04899 UT) capsule capsule Take 1 capsule by mouth Every 7 (Seven) Days. 13 capsule 1     No current facility-administered medications on file prior to visit.        Assessment and Plan  Diagnoses and all orders for this visit:    1. Follow-up exam (Primary)    2. Type 2 diabetes mellitus with other circulatory complication, without long-term current use of insulin  -     CBC Auto Differential  -     Comprehensive Metabolic Panel  -     Hemoglobin A1c  -     Lipid Panel  -     MicroAlbumin, Urine, Random - Urine, Clean Catch  -     TSH    3. Vitamin B12 deficiency  -     Vitamin B12 & Folate    4. Vitamin D deficiency  -     Vitamin D,25-Hydroxy    5. Essential hypertension  -     CBC Auto Differential  -     Comprehensive Metabolic Panel    6. Thyroid disorder screening    7. Class 1 obesity due to excess calories with serious comorbidity and body mass index (BMI) of 33.0 to 33.9 in adult        Assessment &  Plan  1. Hypertension.  Her blood pressure has been elevated, potentially due to recent stress and changes. She is advised to monitor her blood pressure daily, ensuring she is seated for at least 5 minutes prior to measurement, with feet flat on the floor, and avoiding distractions such as phone use or conversation. If her blood pressure readings consistently remain in the 130s or 140s, she should inform the office. A comprehensive lab workup will be conducted to assess her hemoglobin A1c, blood count, cholesterol, vitamin D, B12, and potassium levels. She will be contacted with the results of her lab work on Wednesday or Thursday. If her blood pressure remains elevated, a follow-up appointment will be necessary.    2. Depression.  Her depression scores are high, likely due to significant personal loss. She is encouraged to engage in social activities, consider volunteering, and explore support groups for grief and loss. She is also advised to take breaks from news and social media. She is encouraged to find activities she enjoys and to take time for herself. If her depression persists, medication and counseling options will be considered.    3. Fatigue.  She reports feeling exhausted with minimal exertion and experiencing heart fluttering. She is advised to make an appointment with her cardiologist to discuss these symptoms further.    4. Back pain.  She reports experiencing back pain. She is advised to increase her physical activity gradually to prevent further discomfort and stiffness.    5. Foot pain.  She reports experiencing foot pain. She is advised to increase her physical activity gradually to prevent further discomfort and stiffness.    6. Cataracts.  She has an appointment on 07/24/2024 with Dr. Corrales in [place] regarding her cataracts, which are affecting her vision.    7. Fall.  She reported a fall in November 2024, which she believes was related to her vision issues.      Follow Up   Return in about  6 months (around 8/10/2025).  Patient was given instructions and counseling regarding her condition or for health maintenance advice. Please see specific information pulled into the AVS if appropriate.       Part of this note may be electronic transcription/translation of spoken language to printed text using the Dragon dictation system    Patient or patient representative verbalized consent for the use of Ambient Listening during the visit with  CALISTA Mejia for chart documentation. 2/10/2025  16:30 EST

## 2025-02-11 LAB
25(OH)D3 SERPL-MCNC: 38 NG/ML (ref 30–100)
ALBUMIN UR-MCNC: 1.4 MG/DL
FOLATE SERPL-MCNC: 11.5 NG/ML (ref 4.78–24.2)
VIT B12 BLD-MCNC: 1412 PG/ML (ref 211–946)

## 2025-02-12 DIAGNOSIS — D72.829 LEUKOCYTOSIS, UNSPECIFIED TYPE: Primary | ICD-10-CM

## 2025-02-24 ENCOUNTER — LAB (OUTPATIENT)
Dept: LAB | Facility: HOSPITAL | Age: 80
End: 2025-02-24
Payer: MEDICARE

## 2025-02-24 DIAGNOSIS — D72.829 LEUKOCYTOSIS, UNSPECIFIED TYPE: ICD-10-CM

## 2025-02-24 LAB
BASOPHILS # BLD AUTO: 0.07 10*3/MM3 (ref 0–0.2)
BASOPHILS NFR BLD AUTO: 0.7 % (ref 0–1.5)
DEPRECATED RDW RBC AUTO: 40.7 FL (ref 37–54)
EOSINOPHIL # BLD AUTO: 0.05 10*3/MM3 (ref 0–0.4)
EOSINOPHIL NFR BLD AUTO: 0.5 % (ref 0.3–6.2)
ERYTHROCYTE [DISTWIDTH] IN BLOOD BY AUTOMATED COUNT: 12.7 % (ref 12.3–15.4)
HCT VFR BLD AUTO: 47 % (ref 34–46.6)
HGB BLD-MCNC: 15.1 G/DL (ref 12–15.9)
IMM GRANULOCYTES # BLD AUTO: 0.02 10*3/MM3 (ref 0–0.05)
IMM GRANULOCYTES NFR BLD AUTO: 0.2 % (ref 0–0.5)
LYMPHOCYTES # BLD AUTO: 2.56 10*3/MM3 (ref 0.7–3.1)
LYMPHOCYTES NFR BLD AUTO: 24.6 % (ref 19.6–45.3)
MCH RBC QN AUTO: 28.3 PG (ref 26.6–33)
MCHC RBC AUTO-ENTMCNC: 32.1 G/DL (ref 31.5–35.7)
MCV RBC AUTO: 88 FL (ref 79–97)
MONOCYTES # BLD AUTO: 0.58 10*3/MM3 (ref 0.1–0.9)
MONOCYTES NFR BLD AUTO: 5.6 % (ref 5–12)
NEUTROPHILS NFR BLD AUTO: 68.4 % (ref 42.7–76)
NEUTROPHILS NFR BLD AUTO: 7.14 10*3/MM3 (ref 1.7–7)
NRBC BLD AUTO-RTO: 0 /100 WBC (ref 0–0.2)
PLATELET # BLD AUTO: 364 10*3/MM3 (ref 140–450)
PMV BLD AUTO: 11.6 FL (ref 6–12)
RBC # BLD AUTO: 5.34 10*6/MM3 (ref 3.77–5.28)
WBC NRBC COR # BLD AUTO: 10.42 10*3/MM3 (ref 3.4–10.8)

## 2025-02-24 PROCEDURE — 85025 COMPLETE CBC W/AUTO DIFF WBC: CPT

## 2025-02-26 ENCOUNTER — OFFICE VISIT (OUTPATIENT)
Dept: FAMILY MEDICINE CLINIC | Facility: CLINIC | Age: 80
End: 2025-02-26
Payer: MEDICARE

## 2025-02-26 VITALS
WEIGHT: 218 LBS | HEIGHT: 67 IN | OXYGEN SATURATION: 99 % | SYSTOLIC BLOOD PRESSURE: 132 MMHG | TEMPERATURE: 97.9 F | BODY MASS INDEX: 34.21 KG/M2 | HEART RATE: 99 BPM | DIASTOLIC BLOOD PRESSURE: 82 MMHG

## 2025-02-26 DIAGNOSIS — R53.83 FATIGUE, UNSPECIFIED TYPE: Primary | ICD-10-CM

## 2025-02-26 DIAGNOSIS — I45.10 RBBB: ICD-10-CM

## 2025-02-26 DIAGNOSIS — R94.31 ABNORMAL EKG: ICD-10-CM

## 2025-02-26 DIAGNOSIS — F33.1 MODERATE EPISODE OF RECURRENT MAJOR DEPRESSIVE DISORDER: ICD-10-CM

## 2025-02-26 DIAGNOSIS — I49.9 IRREGULAR HEART RATE: ICD-10-CM

## 2025-02-26 DIAGNOSIS — E16.2 LOW BLOOD SUGAR: ICD-10-CM

## 2025-02-26 LAB
ALBUMIN UR-MCNC: <1.2 MG/DL
ANION GAP SERPL CALCULATED.3IONS-SCNC: 14 MMOL/L (ref 5–15)
BILIRUB BLD-MCNC: NEGATIVE MG/DL
BUN SERPL-MCNC: 15 MG/DL (ref 8–23)
BUN/CREAT SERPL: 22.7 (ref 7–25)
CALCIUM SPEC-SCNC: 9.6 MG/DL (ref 8.6–10.5)
CHLORIDE SERPL-SCNC: 100 MMOL/L (ref 98–107)
CLARITY, POC: CLEAR
CO2 SERPL-SCNC: 23 MMOL/L (ref 22–29)
COLOR UR: YELLOW
CREAT SERPL-MCNC: 0.66 MG/DL (ref 0.57–1)
CREAT UR-MCNC: 39.5 MG/DL
EGFRCR SERPLBLD CKD-EPI 2021: 90.5 ML/MIN/1.73
GLUCOSE BLDC GLUCOMTR-MCNC: 59 MG/DL (ref 70–130)
GLUCOSE SERPL-MCNC: 112 MG/DL (ref 65–99)
GLUCOSE UR STRIP-MCNC: NEGATIVE MG/DL
KETONES UR QL: NEGATIVE
LEUKOCYTE EST, POC: NEGATIVE
MAGNESIUM SERPL-MCNC: 2 MG/DL (ref 1.6–2.4)
MICROALBUMIN/CREAT UR: NORMAL MG/G{CREAT}
NITRITE UR-MCNC: NEGATIVE MG/ML
PH UR: 6 [PH] (ref 5–8)
PHOSPHATE SERPL-MCNC: 3.4 MG/DL (ref 2.5–4.5)
POTASSIUM SERPL-SCNC: 3.6 MMOL/L (ref 3.5–5.2)
PROT UR STRIP-MCNC: NEGATIVE MG/DL
RBC # UR STRIP: NEGATIVE /UL
SODIUM SERPL-SCNC: 137 MMOL/L (ref 136–145)
SP GR UR: 1.01 (ref 1–1.03)
UROBILINOGEN UR QL: NORMAL

## 2025-02-26 PROCEDURE — 81002 URINALYSIS NONAUTO W/O SCOPE: CPT | Performed by: NURSE PRACTITIONER

## 2025-02-26 PROCEDURE — 36415 COLL VENOUS BLD VENIPUNCTURE: CPT | Performed by: NURSE PRACTITIONER

## 2025-02-26 PROCEDURE — 93000 ELECTROCARDIOGRAM COMPLETE: CPT | Performed by: NURSE PRACTITIONER

## 2025-02-26 PROCEDURE — 82570 ASSAY OF URINE CREATININE: CPT | Performed by: NURSE PRACTITIONER

## 2025-02-26 PROCEDURE — 1159F MED LIST DOCD IN RCRD: CPT | Performed by: NURSE PRACTITIONER

## 2025-02-26 PROCEDURE — 84100 ASSAY OF PHOSPHORUS: CPT | Performed by: NURSE PRACTITIONER

## 2025-02-26 PROCEDURE — 82948 REAGENT STRIP/BLOOD GLUCOSE: CPT | Performed by: NURSE PRACTITIONER

## 2025-02-26 PROCEDURE — 3079F DIAST BP 80-89 MM HG: CPT | Performed by: NURSE PRACTITIONER

## 2025-02-26 PROCEDURE — 82043 UR ALBUMIN QUANTITATIVE: CPT | Performed by: NURSE PRACTITIONER

## 2025-02-26 PROCEDURE — 99214 OFFICE O/P EST MOD 30 MIN: CPT | Performed by: NURSE PRACTITIONER

## 2025-02-26 PROCEDURE — 1126F AMNT PAIN NOTED NONE PRSNT: CPT | Performed by: NURSE PRACTITIONER

## 2025-02-26 PROCEDURE — 1160F RVW MEDS BY RX/DR IN RCRD: CPT | Performed by: NURSE PRACTITIONER

## 2025-02-26 PROCEDURE — 80048 BASIC METABOLIC PNL TOTAL CA: CPT | Performed by: NURSE PRACTITIONER

## 2025-02-26 PROCEDURE — 83735 ASSAY OF MAGNESIUM: CPT | Performed by: NURSE PRACTITIONER

## 2025-02-26 PROCEDURE — 3075F SYST BP GE 130 - 139MM HG: CPT | Performed by: NURSE PRACTITIONER

## 2025-02-26 RX ORDER — AMOXICILLIN 875 MG/1
875 TABLET, COATED ORAL
COMMUNITY
Start: 2025-02-18 | End: 2025-02-28

## 2025-02-26 NOTE — PROGRESS NOTES
Chief Complaint  Results (labwork), Urinary Frequency, Shaking, and Chills    Subjective        Medical History: has a past medical history of Allergic rhinitis (10/30/2014), Arthritis, Asthma, Bronchitis, Chronic pain of right knee (01/29/2015), Deafness, Gall stones (1997), High blood pressure, High cholesterol, Hyperlipemia, Hyperlipidemia (02/08/2018), Hypertension, Impaired fasting glucose (04/28/2014), Osteopenia (11/2015), Pneumonia, Pressure ulcer, stage 1, Reflux esophagitis, Seasonal allergies, Sinus trouble, Type 2 diabetes mellitus without complication, and Vertigo.     Surgical History: has a past surgical history that includes Colonoscopy; Gallbladder surgery (1997); Hysterectomy (1983); and Tonsillectomy.     Family History: family history includes Arthritis in her father; Cancer in her mother; Diabetes type II in her brother and mother; Heart disease (age of onset: 50) in her brother; Heart disease (age of onset: 59) in her father; Kidney cancer in her mother.     Social History: reports that she has never smoked. She has never been exposed to tobacco smoke. She has never used smokeless tobacco. She reports that she does not currently use alcohol. She reports that she does not use drugs.    Gaby Reyes presents to [facility]    Urinary Frequency   Associated symptoms include chills and frequency.   Shaking  Symptoms include chills and fatigue.    Pertinent negative symptoms include no abdominal pain.   Chills  Symptoms include chills and fatigue.    Pertinent negative symptoms include no abdominal pain.   Blood Sugar Problem  Symptoms are new.   Onset was 1 to 4 weeks.   Symptoms occur intermittently.   Symptoms include chills and fatigue.    Pertinent negative symptoms include no abdominal pain.   Other symptom: shaking on the inside.   Aggravating factors include nothing.   Treatments tried: checking home BS, eating regular.       History of Present Illness  The  patient presents for evaluation of hypoglycemia, depression, and urinary frequency.    She has been monitoring her blood glucose levels at home, which have consistently been within the normal range, typically around 107 or 96 in the mornings. However, she experienced a significant drop to 59 today, prompting the administration of peanut butter, crackers, and water to elevate her levels. She reports experiencing hypoglycemic episodes at night, characterized by waking up with low blood sugar levels. She has adjusted her medication schedule to the morning, as taking it at night resulted in sweating and clamminess. She does not consume a bedtime snack but ensures she eats dinner late. Her dietary habits include three meals and an afternoon snack. She has been making efforts to maintain a balanced diet, aiming for three meals and one snack daily. She has been monitoring her blood glucose levels during these episodes. On Sunday, she experienced a hypoglycemic episode after eating and taking her medication, during which she became pale, her eyes darkened, and she felt weak and shaky. Her blood glucose level was 153 at that time. After returning from [facility], she felt unwell and checked her blood glucose level, which was 72. She had not eaten since breakfast, which consisted of a bowl of mixed Special K and Cheerios with walnuts and a hard-boiled egg. This morning, her breakfast included Cheerios, Special K, and a couple of slices of turkey. She has been incorporating protein into her diet, as she is aware that cereal and milk alone do not provide sufficient protein.    She has been experiencing tinnitus and sinus-related symptoms, which have been persistent for the past few months. She sought medical attention at an immediate care facility due to her inability to secure an appointment here, where she was prescribed antibiotics. She has a dental appointment scheduled for Tuesday to assess the need for a root canal on one of  "her molars, which has darkened. She also reports frequent urination throughout the night, despite maintaining adequate hydration. Her urine is clear and odorless. She has a history of bladder control issues.    She acknowledges the impact of depression on her overall health status over the past few months. She successfully navigated the Christmas season, but the subsequent two months have been challenging. She plans to relocate to her sister's residence this year. She expresses concern about potential liver damage or kidney disease. She has been experiencing tinnitus and sinus-related symptoms, which have been persistent for the past few months. She sought medical attention at an immediate care facility due to her inability to secure an appointment here, where she was prescribed antibiotics. She has a dental appointment scheduled for Tuesday to assess the need for a root canal on one of her molars, which has darkened. She also reports frequent urination throughout the night, despite maintaining adequate hydration. Her urine is clear and odorless. She has a history of bladder control issues. She has a support system in place, with her jensenon and his wife residing nearby and providing assistance. She also has family members [place] in [place] who check on her. She has plans to pack her belongings and make minor repairs to her house before selling it. She intends to keep her bedroom furniture and purchase a new bed. She recently renovated her house and finds solace in participating in [employer].    MEDICATIONS  Current: amoxicillin      Objective   Vital Signs:   /82   Pulse 99   Temp 97.9 °F (36.6 °C)   Ht 170.2 cm (67\")   Wt 98.9 kg (218 lb)   SpO2 99%   BMI 34.14 kg/m²       Wt Readings from Last 3 Encounters:   02/26/25 98.9 kg (218 lb)   02/10/25 96.7 kg (213 lb 1.6 oz)   08/19/24 100 kg (221 lb)        BP Readings from Last 3 Encounters:   02/26/25 132/82   02/10/25 122/66   08/19/24 138/88    "             Physical Exam  Vitals reviewed.   Constitutional:       General: She is not in acute distress.     Appearance: Normal appearance. She is well-developed. She is obese. She is not ill-appearing.   HENT:      Head: Normocephalic and atraumatic.      Left Ear: External ear normal.   Eyes:      Conjunctiva/sclera: Conjunctivae normal.      Pupils: Pupils are equal, round, and reactive to light.   Cardiovascular:      Rate and Rhythm: Normal rate and regular rhythm. Rhythm regularly irregular.      Heart sounds: No murmur heard.  Pulmonary:      Effort: Pulmonary effort is normal.      Breath sounds: Normal breath sounds. No wheezing.   Skin:     General: Skin is warm and dry.   Neurological:      Mental Status: She is alert and oriented to person, place, and time.   Psychiatric:         Mood and Affect: Mood and affect normal.         Behavior: Behavior normal.         Thought Content: Thought content normal.         Judgment: Judgment normal.        Result Review :  {The following data was reviewed by CALISTA Mejia on 02/26/2025.  Common labs          8/21/2024    09:56 2/10/2025    16:36 2/24/2025    08:50   Common Labs   Glucose 95  114     BUN 14  25     Creatinine 0.65  0.67     Sodium 141  139     Potassium 3.9  3.9     Chloride 103  101     Calcium 9.6  10.0     Albumin  4.3     Total Bilirubin  0.3     Alkaline Phosphatase  81     AST (SGOT)  14     ALT (SGPT)  11     WBC  13.24  10.42    Hemoglobin  16.1  15.1    Hematocrit  46.3  47.0    Platelets  409  364    Total Cholesterol  169     Triglycerides  166     HDL Cholesterol  42     LDL Cholesterol   98     Hemoglobin A1C  5.40     Microalbumin, Urine  1.4       Data reviewed : previous office notes      ECG 12 Lead    Date/Time: 2/26/2025 1:41 PM  Performed by: Ramiro Mccloud APRN    Authorized by: Ramiro Mccloud APRN  Comparison: compared with previous ECG from 11/14/2023  Comparison to previous ECG: Changes in EKG  including sinus rhythm with PACs, probable left atrial enlargement, incomplete right bundle branch block and LAFB with considering right ventricular hypertrophy with secondary repolarization abnormality.  Rhythm: sinus rhythm  Ectopy: atrial premature contractions  Rate: normal  BPM: 98  Conduction: conduction normal  Conduction: right bundle branch block and left anterior fascicular block  ST Segments: ST segments normal  T Waves: T waves normal  QRS axis: normal  Other findings: early repolarization    Clinical impression: abnormal EKG              Current Outpatient Medications on File Prior to Visit   Medication Sig Dispense Refill    albuterol sulfate  (90 Base) MCG/ACT inhaler Inhale 2 puffs Every 6 (Six) Hours. 8.6 g 1    amoxicillin (AMOXIL) 875 MG tablet Take 1 tablet by mouth.      aspirin 81 MG chewable tablet Chew 1 tablet Daily. 90 tablet 1    azelastine (ASTELIN) 0.1 % nasal spray Administer 2 sprays into the nostril(s) as directed by provider 2 (Two) Times a Day. Use in each nostril as directed 30 mL 5    Calcium Carbonate-Vitamin D 600-10 MG-MCG per tablet Take 1 tablet by mouth 2 (Two) Times a Day. 180 tablet 1    EPINEPHrine (EPIPEN) 0.3 MG/0.3ML solution auto-injector injection       fexofenadine (ALLEGRA) 180 MG tablet Take 1 tablet by mouth Daily. 90 tablet 2    fluticasone (FLONASE) 50 MCG/ACT nasal spray Administer 1 spray into the nostril(s) as directed by provider Daily. 16 g 3    fluticasone-salmeterol (Advair HFA) 230-21 MCG/ACT inhaler Inhale 2 puffs 2 (Two) Times a Day. 8 g 1    glucose blood test strip BID fasting and 2hr after largest meal E11.9 180 each 2    glucose monitor monitoring kit Use 1 each As Needed (BID). E11.9 BID fasting and 2hr after largest meal 1 each 0    hydroCHLOROthiazide 25 MG tablet Take 1 tablet by mouth Daily for 180 days. 90 tablet 1    Lancets 28G misc Use 1 Device 2 (Two) Times a Day. 180 each 1    levalbuterol (XOPENEX HFA) 45 MCG/ACT inhaler Inhale  1-2 puffs Every 4 (Four) Hours As Needed for Wheezing. 15 g 11    metFORMIN (GLUCOPHAGE) 500 MG tablet Take 1 tablet by mouth Daily. 90 tablet 1    montelukast (SINGULAIR) 10 MG tablet Take 1 tablet by mouth Every Night. 90 tablet 1    potassium chloride (KLOR-CON M20) 20 MEQ CR tablet Take 1 tablet by mouth 2 (Two) Times a Day. 180 tablet 1    pravastatin (PRAVACHOL) 10 MG tablet Take 1 tablet by mouth Daily. 90 tablet 1    vitamin B-12 (CYANOCOBALAMIN) 1000 MCG tablet Take 1 tablet by mouth Daily. 90 tablet 1    vitamin D (ERGOCALCIFEROL) 1.25 MG (71935 UT) capsule capsule Take 1 capsule by mouth Every 7 (Seven) Days. 13 capsule 1     No current facility-administered medications on file prior to visit.        Assessment and Plan  Diagnoses and all orders for this visit:    1. Fatigue, unspecified type (Primary)  -     Microalbumin / Creatinine Urine Ratio - Urine, Clean Catch  -     Urine Culture - Urine, Urine, Clean Catch  -     POC Glucose  -     POCT urinalysis dipstick, manual  -     Adult Transthoracic Echo Complete W/ Cont if Necessary Per Protocol; Future    2. Low blood sugar  -     Microalbumin / Creatinine Urine Ratio - Urine, Clean Catch  -     Urine Culture - Urine, Urine, Clean Catch  -     POC Glucose  -     POCT urinalysis dipstick, manual    3. Irregular heart rate  -     ECG 12 Lead  -     Basic Metabolic Panel  -     Magnesium  -     Phosphorus  -     Holter monitor - 48 hour; Future  -     POCT urinalysis dipstick, manual  -     Adult Transthoracic Echo Complete W/ Cont if Necessary Per Protocol; Future    4. RBBB  -     Adult Transthoracic Echo Complete W/ Cont if Necessary Per Protocol; Future    5. Abnormal EKG  -     Adult Transthoracic Echo Complete W/ Cont if Necessary Per Protocol; Future    6. Moderate episode of recurrent major depressive disorder    Other orders  -     glucose (DEX4) 4 GM chewable tablet; Chew 4 tablets As Needed for Low Blood Sugar.  Dispense: 120 tablet; Refill:  3        Assessment & Plan  1. Hypoglycemia.  Her blood glucose levels have been fluctuating, with a recent drop to 59. She is not on any medications that would cause hypoglycemia. She is advised to consume a light snack containing protein, such as a cheese stick or yogurt, approximately 30 minutes before bedtime to prevent nocturnal hypoglycemia. Additionally, she should keep glucose tablets, hard mints, or butterscotch candies by her bedside for quick access in case of hypoglycemic episodes during the night. A prescription for glucose tablets will be sent to her pharmacy.    2. Depression.  She reports feeling depressed over the last couple of months, which has contributed to her lack of energy and motivation. She is encouraged to seek support from family and friends and to engage in activities that she enjoys. If her symptoms persist or worsen, the initiation of antidepressant therapy will be considered.    3. Urinary tract infection.  She is currently on antibiotics for an ear infection and sinus symptoms. Despite this, a urine test will be conducted to rule out a urinary tract infection, which can sometimes go unnoticed and contribute to feeling unwell.    Brief Urine Lab Results  (Last result in the past 365 days)        Color   Clarity   Blood   Leuk Est   Nitrite   Protein   CREAT   Urine HCG        02/26/25 1420 Yellow   Clear   Negative   Negative   Negative   Negative                    Follow Up   Return in about 6 months (around 8/26/2025) for Recheck.  Patient was given instructions and counseling regarding her condition or for health maintenance advice. Please see specific information pulled into the AVS if appropriate.     Spoke with Елена Rosado nurse practitioner for Dr. Brambila patient has an appointment in August 2025.  Discussed that I did an EKG for irregular heart rate that showed PACs, incomplete right bundle branch block and LAFB with considering a right ventricular hypertrophy there is change  from her previous EKG 11/14/2023.  Discussed that had ordered a Holter monitor for 48 hours and checking labs, she also mentioned going ahead and getting an echocardiogram if anything abnormal will have patient to follow-up with cardiology sooner.    Part of this note may be electronic transcription/translation of spoken language to printed text using the Dragon dictation system    Patient or patient representative verbalized consent for the use of Ambient Listening during the visit with  CALISTA Mejia for chart documentation. 2/26/2025  13:20 EST

## 2025-03-01 ENCOUNTER — HOSPITAL ENCOUNTER (EMERGENCY)
Facility: HOSPITAL | Age: 80
Discharge: HOME | End: 2025-03-01
Attending: EMERGENCY MEDICINE
Payer: MEDICARE

## 2025-03-01 ENCOUNTER — APPOINTMENT (OUTPATIENT)
Dept: GENERAL RADIOLOGY | Facility: HOSPITAL | Age: 80
End: 2025-03-01
Payer: MEDICARE

## 2025-03-01 VITALS
TEMPERATURE: 97.9 F | SYSTOLIC BLOOD PRESSURE: 128 MMHG | DIASTOLIC BLOOD PRESSURE: 51 MMHG | HEART RATE: 92 BPM | WEIGHT: 219.58 LBS | RESPIRATION RATE: 31 BRPM | HEIGHT: 67 IN | OXYGEN SATURATION: 93 % | BODY MASS INDEX: 34.46 KG/M2

## 2025-03-01 DIAGNOSIS — F43.21 GRIEF REACTION: ICD-10-CM

## 2025-03-01 DIAGNOSIS — R00.2 PALPITATIONS: Primary | ICD-10-CM

## 2025-03-01 LAB
ALBUMIN SERPL-MCNC: 4.1 G/DL (ref 3.5–5.2)
ALBUMIN/GLOB SERPL: 1.4 G/DL
ALP SERPL-CCNC: 71 U/L (ref 39–117)
ALT SERPL W P-5'-P-CCNC: 13 U/L (ref 1–33)
ANION GAP SERPL CALCULATED.3IONS-SCNC: 13.9 MMOL/L (ref 5–15)
AST SERPL-CCNC: 14 U/L (ref 1–32)
BASOPHILS # BLD AUTO: 0.08 10*3/MM3 (ref 0–0.2)
BASOPHILS NFR BLD AUTO: 0.8 % (ref 0–1.5)
BILIRUB SERPL-MCNC: 0.3 MG/DL (ref 0–1.2)
BILIRUB UR QL STRIP: NEGATIVE
BUN SERPL-MCNC: 18 MG/DL (ref 8–23)
BUN/CREAT SERPL: 28.6 (ref 7–25)
CALCIUM SPEC-SCNC: 10.1 MG/DL (ref 8.6–10.5)
CHLORIDE SERPL-SCNC: 99 MMOL/L (ref 98–107)
CLARITY UR: CLEAR
CO2 SERPL-SCNC: 25.1 MMOL/L (ref 22–29)
COLOR UR: YELLOW
CREAT SERPL-MCNC: 0.63 MG/DL (ref 0.57–1)
DEPRECATED RDW RBC AUTO: 37.6 FL (ref 37–54)
EGFRCR SERPLBLD CKD-EPI 2021: 91.5 ML/MIN/1.73
EOSINOPHIL # BLD AUTO: 0.06 10*3/MM3 (ref 0–0.4)
EOSINOPHIL NFR BLD AUTO: 0.6 % (ref 0.3–6.2)
ERYTHROCYTE [DISTWIDTH] IN BLOOD BY AUTOMATED COUNT: 12 % (ref 12.3–15.4)
GEN 5 1HR TROPONIN T REFLEX: 11 NG/L
GLOBULIN UR ELPH-MCNC: 2.9 GM/DL
GLUCOSE SERPL-MCNC: 126 MG/DL (ref 65–99)
GLUCOSE UR STRIP-MCNC: NEGATIVE MG/DL
HCT VFR BLD AUTO: 43.5 % (ref 34–46.6)
HGB BLD-MCNC: 14.8 G/DL (ref 12–15.9)
HGB UR QL STRIP.AUTO: NEGATIVE
HOLD SPECIMEN: NORMAL
HOLD SPECIMEN: NORMAL
IMM GRANULOCYTES # BLD AUTO: 0.02 10*3/MM3 (ref 0–0.05)
IMM GRANULOCYTES NFR BLD AUTO: 0.2 % (ref 0–0.5)
KETONES UR QL STRIP: NEGATIVE
LEUKOCYTE ESTERASE UR QL STRIP.AUTO: NEGATIVE
LYMPHOCYTES # BLD AUTO: 3.15 10*3/MM3 (ref 0.7–3.1)
LYMPHOCYTES NFR BLD AUTO: 30.8 % (ref 19.6–45.3)
MAGNESIUM SERPL-MCNC: 2 MG/DL (ref 1.6–2.4)
MCH RBC QN AUTO: 29 PG (ref 26.6–33)
MCHC RBC AUTO-ENTMCNC: 34 G/DL (ref 31.5–35.7)
MCV RBC AUTO: 85.3 FL (ref 79–97)
MONOCYTES # BLD AUTO: 0.61 10*3/MM3 (ref 0.1–0.9)
MONOCYTES NFR BLD AUTO: 6 % (ref 5–12)
NEUTROPHILS NFR BLD AUTO: 6.32 10*3/MM3 (ref 1.7–7)
NEUTROPHILS NFR BLD AUTO: 61.6 % (ref 42.7–76)
NITRITE UR QL STRIP: NEGATIVE
NRBC BLD AUTO-RTO: 0 /100 WBC (ref 0–0.2)
PH UR STRIP.AUTO: 7 [PH] (ref 5–8)
PLATELET # BLD AUTO: 313 10*3/MM3 (ref 140–450)
PMV BLD AUTO: 10.9 FL (ref 6–12)
POTASSIUM SERPL-SCNC: 3.5 MMOL/L (ref 3.5–5.2)
PROT SERPL-MCNC: 7 G/DL (ref 6–8.5)
PROT UR QL STRIP: NEGATIVE
QT INTERVAL: 389 MS
QTC INTERVAL: 478 MS
RBC # BLD AUTO: 5.1 10*6/MM3 (ref 3.77–5.28)
SODIUM SERPL-SCNC: 138 MMOL/L (ref 136–145)
SP GR UR STRIP: 1.01 (ref 1–1.03)
TROPONIN T NUMERIC DELTA: 4 NG/L
TROPONIN T SERPL HS-MCNC: 7 NG/L
UROBILINOGEN UR QL STRIP: NORMAL
WBC NRBC COR # BLD AUTO: 10.24 10*3/MM3 (ref 3.4–10.8)
WHOLE BLOOD HOLD COAG: NORMAL
WHOLE BLOOD HOLD SPECIMEN: NORMAL

## 2025-03-01 PROCEDURE — 84484 ASSAY OF TROPONIN QUANT: CPT | Performed by: EMERGENCY MEDICINE

## 2025-03-01 PROCEDURE — 93005 ELECTROCARDIOGRAM TRACING: CPT | Performed by: EMERGENCY MEDICINE

## 2025-03-01 PROCEDURE — 80053 COMPREHEN METABOLIC PANEL: CPT

## 2025-03-01 PROCEDURE — 83735 ASSAY OF MAGNESIUM: CPT

## 2025-03-01 PROCEDURE — 36415 COLL VENOUS BLD VENIPUNCTURE: CPT

## 2025-03-01 PROCEDURE — 99284 EMERGENCY DEPT VISIT MOD MDM: CPT

## 2025-03-01 PROCEDURE — 71045 X-RAY EXAM CHEST 1 VIEW: CPT

## 2025-03-01 PROCEDURE — 85025 COMPLETE CBC W/AUTO DIFF WBC: CPT

## 2025-03-01 PROCEDURE — 81003 URINALYSIS AUTO W/O SCOPE: CPT

## 2025-03-01 PROCEDURE — 84484 ASSAY OF TROPONIN QUANT: CPT

## 2025-03-01 PROCEDURE — 93005 ELECTROCARDIOGRAM TRACING: CPT

## 2025-03-01 RX ORDER — SODIUM CHLORIDE 0.9 % (FLUSH) 0.9 %
10 SYRINGE (ML) INJECTION AS NEEDED
Status: DISCONTINUED | OUTPATIENT
Start: 2025-03-01 | End: 2025-03-02 | Stop reason: HOSPADM

## 2025-03-02 NOTE — ED NOTES
Saline lock removed inPatient verbalized understanding. Copy of discharge instructions given to patient. Patient discharged to home in good condition per personal vehicle, friend coming to pick her up. Patient ambulated out to lobby without any difficulties.

## 2025-03-02 NOTE — ED PROVIDER NOTES
"Time: 9:34 PM EST  Date of encounter:  3/1/2025  Independent Historian/Clinical History and Information was obtained by:   Patient    History is limited by: N/A    Chief Complaint: Dizziness, heart racing      History of Present Illness:  Patient is a 79 y.o. year old female who presents to the emergency department for evaluation of dizziness and heart racing.  Patient states she has not felt well for 2 months.  Vague symptoms but was placed on antibiotics for a possible sinus infection.  She still has 2 days left of antibiotics.  Afebrile.  Denies chest pain.  Complains of primarily a sensation of heart racing.  She is set to follow-up with Dr. Brambila for a Holter monitor.  At the time of my history and physical exam she states she feels entirely normal and symptoms have fully resolved.  Without my prompting, patient asked me if this \"could all be anxiety?\"  Patient goes on to tell me that her  has recently  and she has been understandably struggling since that time.      Patient Care Team  Primary Care Provider: Ramiro Mccloud APRN    Past Medical History:     No Known Allergies  Past Medical History:   Diagnosis Date    Allergic rhinitis 10/30/2014    Anxiety     Arthritis     Asthma     STOPPED IN     Bronchitis     Chronic pain of right knee 2015    Deafness     Depression     Gall stones     High blood pressure     High cholesterol     Hyperlipemia     Hyperlipidemia 2018    Hypertension     Impaired fasting glucose 2014    Osteopenia 2015    L1 LUMBAR SPINE    Pneumonia     Pressure ulcer, stage 1     Reflux esophagitis     Seasonal allergies     Sinus trouble     Type 2 diabetes mellitus without complication     Vertigo      Past Surgical History:   Procedure Laterality Date    COLONOSCOPY      GALLBLADDER SURGERY      HYSTERECTOMY      TONSILLECTOMY       Family History   Problem Relation Age of Onset    Cancer Mother     Diabetes type II Mother     " Kidney cancer Mother     Heart disease Father 59        MI MASSIVE     Arthritis Father     Heart disease Brother 50    Diabetes type II Brother        Home Medications:  Prior to Admission medications    Medication Sig Start Date End Date Taking? Authorizing Provider   albuterol sulfate  (90 Base) MCG/ACT inhaler Inhale 2 puffs Every 6 (Six) Hours. 1/6/25   Ramiro Mccloud APRN   amoxicillin (AMOXIL) 875 MG tablet Take 1 tablet by mouth. 2/18/25 2/28/25  Provider, MD Tyesha   aspirin 81 MG chewable tablet Chew 1 tablet Daily. 1/6/25   Ramiro Mccloud APRN   azelastine (ASTELIN) 0.1 % nasal spray Administer 2 sprays into the nostril(s) as directed by provider 2 (Two) Times a Day. Use in each nostril as directed 1/6/25   Ramiro Mccloud APRN   Calcium Carbonate-Vitamin D 600-10 MG-MCG per tablet Take 1 tablet by mouth 2 (Two) Times a Day. 1/6/25   Ramiro Mccloud APRN   EPINEPHrine (EPIPEN) 0.3 MG/0.3ML solution auto-injector injection  9/21/23   Provider, MD Tyesha   fexofenadine (ALLEGRA) 180 MG tablet Take 1 tablet by mouth Daily. 1/6/25   Ramiro Mccloud APRN   fluticasone (FLONASE) 50 MCG/ACT nasal spray Administer 1 spray into the nostril(s) as directed by provider Daily. 1/6/25   Ramiro Mccloud APRN   fluticasone-salmeterol (Advair HFA) 230-21 MCG/ACT inhaler Inhale 2 puffs 2 (Two) Times a Day. 1/6/25   Ramiro Mccloud APRN   glucose (DEX4) 4 GM chewable tablet Chew 4 tablets As Needed for Low Blood Sugar. 2/26/25   Ramiro Mccloud APRN   glucose blood test strip BID fasting and 2hr after largest meal E11.9 12/28/23   Ramiro Mccloud APRN   glucose monitor monitoring kit Use 1 each As Needed (BID). E11.9 BID fasting and 2hr after largest meal 1/6/25 10/2/27  Ramiro Mccolud APRN   hydroCHLOROthiazide 25 MG tablet Take 1 tablet by mouth Daily for 180 days. 1/8/25 7/7/25  Ramiro Mccloud APRN   Lancets 28G misc Use 1  Device 2 (Two) Times a Day. 1/6/25   Ramiro Mccloud APRN   levalbuterol (XOPENEX HFA) 45 MCG/ACT inhaler Inhale 1-2 puffs Every 4 (Four) Hours As Needed for Wheezing. 1/6/25   Ramiro Mccloud APRN   metFORMIN (GLUCOPHAGE) 500 MG tablet Take 1 tablet by mouth Daily. 1/6/25   Ramiro Mccloud APRN   montelukast (SINGULAIR) 10 MG tablet Take 1 tablet by mouth Every Night. 1/6/25   Ramiro Mccloud APRN   potassium chloride (KLOR-CON M20) 20 MEQ CR tablet Take 1 tablet by mouth 2 (Two) Times a Day. 1/6/25   Ramiro Mccloud APRN   pravastatin (PRAVACHOL) 10 MG tablet Take 1 tablet by mouth Daily. 1/6/25   Ramiro Mccloud APRN   vitamin B-12 (CYANOCOBALAMIN) 1000 MCG tablet Take 1 tablet by mouth Daily. 1/6/25   Ramiro Mccloud APRN   vitamin D (ERGOCALCIFEROL) 1.25 MG (73007 UT) capsule capsule Take 1 capsule by mouth Every 7 (Seven) Days. 1/6/25   Ramiro Mccloud APRN        Social History:   Social History     Tobacco Use    Smoking status: Never     Passive exposure: Never    Smokeless tobacco: Never   Vaping Use    Vaping status: Never Used   Substance Use Topics    Alcohol use: Not Currently    Drug use: Never         Review of Systems:  Review of Systems   Constitutional:  Negative for chills and fever.   HENT:  Negative for congestion, rhinorrhea and sore throat.    Eyes:  Negative for photophobia.   Respiratory:  Negative for apnea, cough, chest tightness and shortness of breath.    Cardiovascular:  Positive for palpitations. Negative for chest pain.   Gastrointestinal:  Negative for abdominal pain, diarrhea, nausea and vomiting.   Endocrine: Negative.    Genitourinary:  Negative for difficulty urinating and dysuria.   Musculoskeletal:  Negative for back pain, joint swelling and myalgias.   Skin:  Negative for color change and wound.   Allergic/Immunologic: Negative.    Neurological:  Negative for seizures and headaches.   Psychiatric/Behavioral: Negative.    "  All other systems reviewed and are negative.       Physical Exam:  /69 (BP Location: Right arm, Patient Position: Sitting)   Pulse 86   Temp 97.9 °F (36.6 °C) (Oral)   Resp 18   Ht 170.2 cm (67\")   Wt 99.6 kg (219 lb 9.3 oz)   SpO2 96%   BMI 34.39 kg/m²     Physical Exam  Vitals and nursing note reviewed.   Constitutional:       General: She is awake.      Appearance: Normal appearance.   HENT:      Head: Normocephalic and atraumatic.      Nose: Nose normal.      Mouth/Throat:      Mouth: Mucous membranes are moist.   Eyes:      Extraocular Movements: Extraocular movements intact.      Pupils: Pupils are equal, round, and reactive to light.   Cardiovascular:      Rate and Rhythm: Normal rate and regular rhythm.      Heart sounds: Normal heart sounds.   Pulmonary:      Effort: Pulmonary effort is normal. No respiratory distress.      Breath sounds: Normal breath sounds. No wheezing, rhonchi or rales.   Abdominal:      General: Bowel sounds are normal.      Palpations: Abdomen is soft.      Tenderness: There is no abdominal tenderness. There is no guarding or rebound.      Comments: No rigidity   Musculoskeletal:         General: No tenderness. Normal range of motion.      Cervical back: Normal range of motion and neck supple.   Skin:     General: Skin is warm and dry.      Coloration: Skin is not jaundiced.   Neurological:      General: No focal deficit present.      Mental Status: She is alert. Mental status is at baseline.   Psychiatric:      Comments: Moderately anxious                    Medical Decision Making:      Comorbidities that affect care:    Hypertension, hyperlipidemia, pneumonia, anxiety/depression, type 2 diabetes, asthma    External Notes reviewed:    Previous Clinic Note: Family medicine office visit 2/26/2025.  Description: Unspecified fatigue      The following orders were placed and all results were independently analyzed by me:  Orders Placed This Encounter   Procedures    XR " Chest 1 View    [place] Draw    Comprehensive Metabolic Panel    High Sensitivity Troponin T    Magnesium    Urinalysis With Microscopic If Indicated (No Culture) - Urine, Clean Catch    CBC Auto Differential    High Sensitivity Troponin T 1Hr    NPO Diet NPO Type: Strict NPO    Undress & Gown    Continuous Pulse Oximetry    Vital Signs    Orthostatic Blood Pressure    Oxygen Therapy- Nasal Cannula; Titrate 1-6 LPM Per SpO2; 90 - 95%    ECG 12 Lead ED Triage Standing Order; Weak / Dizzy / AMS    Insert Peripheral IV    Fall Precautions    CBC & Differential    Green Top (Gel)    Lavender Top    Gold Top - SST    Light Blue Top       Medications Given in the Emergency Department:  Medications   sodium chloride 0.9 % flush 10 mL (has no administration in time range)        ED Course:    ED Course as of 03/01/25 2201   Sat Mar 01, 2025   2133 I have personally interpreted the EKG today and it shows no evidence of any acute ischemia or heart arrhythmia.  Right bundle branch block [RP]      ED Course User Index  [RP] Sreekanth Gomez MD       Labs:    Lab Results (last 24 hours)       Procedure Component Value Units Date/Time    CBC & Differential [[id number]]  (Abnormal) Collected: 03/01/25 1941    Specimen: Blood Updated: 03/01/25 1949    Narrative:      The following orders were created for panel order CBC & Differential.  Procedure                               Abnormality         Status                     ---------                               -----------         ------                     CBC Auto Differential[[id number]]        Abnormal            Final result                 Please view results for these tests on the individual orders.    Comprehensive Metabolic Panel [[id number]]  (Abnormal) Collected: 03/01/25 1941    Specimen: Blood Updated: 03/01/25 2011     Glucose 126 mg/dL      BUN 18 mg/dL      Creatinine 0.63 mg/dL      Sodium 138 mmol/L      Potassium 3.5 mmol/L      Chloride 99 mmol/L      CO2 25.1  mmol/L      Calcium 10.1 mg/dL      Total Protein 7.0 g/dL      Albumin 4.1 g/dL      ALT (SGPT) 13 U/L      AST (SGOT) 14 U/L      Alkaline Phosphatase 71 U/L      Total Bilirubin 0.3 mg/dL      Globulin 2.9 gm/dL      A/G Ratio 1.4 g/dL      BUN/Creatinine Ratio 28.6     Anion Gap 13.9 mmol/L      eGFR 91.5 mL/min/1.73     Narrative:      GFR Categories in Chronic Kidney Disease (CKD)      GFR Category          GFR (mL/min/1.73)    Interpretation  G1                     90 or greater         Normal or high (1)  G2                      60-89                Mild decrease (1)  G3a                   45-59                Mild to moderate decrease  G3b                   30-44                Moderate to severe decrease  G4                    15-29                Severe decrease  G5                    14 or less           Kidney failure          (1)In the absence of evidence of kidney disease, neither GFR category G1 or G2 fulfill the criteria for CKD.    eGFR calculation 2021 CKD-EPI creatinine equation, which does not include race as a factor    High Sensitivity Troponin T [[id number]]  (Normal) Collected: 03/01/25 1941    Specimen: Blood Updated: 03/01/25 2014     HS Troponin T 7 ng/L      Comment: Specimen hemolyzed.  Results may be falsely decreased.       Narrative:      High Sensitive Troponin T Reference Range:  <14.0 ng/L- Negative Female for AMI  <22.0 ng/L- Negative Male for AMI  >=14 - Abnormal Female indicating possible myocardial injury.  >=22 - Abnormal Male indicating possible myocardial injury.   Clinicians would have to utilize clinical acumen, EKG, Troponin, and serial changes to determine if it is an Acute Myocardial Infarction or myocardial injury due to an underlying chronic condition.         Magnesium [[id number]]  (Normal) Collected: 03/01/25 1941    Specimen: Blood Updated: 03/01/25 2011     Magnesium 2.0 mg/dL     CBC Auto Differential [[id number]]  (Abnormal) Collected: 03/01/25 1941    Specimen:  Blood Updated: 03/01/25 1949     WBC 10.24 10*3/mm3      RBC 5.10 10*6/mm3      Hemoglobin 14.8 g/dL      Hematocrit 43.5 %      MCV 85.3 fL      MCH 29.0 pg      MCHC 34.0 g/dL      RDW 12.0 %      RDW-SD 37.6 fl      MPV 10.9 fL      Platelets 313 10*3/mm3      Neutrophil % 61.6 %      Lymphocyte % 30.8 %      Monocyte % 6.0 %      Eosinophil % 0.6 %      Basophil % 0.8 %      Immature Grans % 0.2 %      Neutrophils, Absolute 6.32 10*3/mm3      Lymphocytes, Absolute 3.15 10*3/mm3      Monocytes, Absolute 0.61 10*3/mm3      Eosinophils, Absolute 0.06 10*3/mm3      Basophils, Absolute 0.08 10*3/mm3      Immature Grans, Absolute 0.02 10*3/mm3      nRBC 0.0 /100 WBC     Urinalysis With Microscopic If Indicated (No Culture) - Urine, Clean Catch [[id number]]  (Normal) Collected: 03/01/25 2027    Specimen: Urine, Clean Catch Updated: 03/01/25 2040     Color, UA Yellow     Appearance, UA Clear     pH, UA 7.0     Specific Gravity, UA 1.008     Glucose, UA Negative     Ketones, UA Negative     Bilirubin, UA Negative     Blood, UA Negative     Protein, UA Negative     Leuk Esterase, UA Negative     Nitrite, UA Negative     Urobilinogen, UA 0.2 E.U./dL    Narrative:      Urine microscopic not indicated.    High Sensitivity Troponin T 1Hr [[id number]]  (Abnormal) Collected: 03/01/25 2053    Specimen: Blood Updated: 03/01/25 2128     HS Troponin T 11 ng/L      Troponin T Numeric Delta 4 ng/L     Narrative:      High Sensitive Troponin T Reference Range:  <14.0 ng/L- Negative Female for AMI  <22.0 ng/L- Negative Male for AMI  >=14 - Abnormal Female indicating possible myocardial injury.  >=22 - Abnormal Male indicating possible myocardial injury.   Clinicians would have to utilize clinical acumen, EKG, Troponin, and serial changes to determine if it is an Acute Myocardial Infarction or myocardial injury due to an underlying chronic condition.                  Imaging:    XR Chest 1 View    Result Date: 3/1/2025  XR CHEST 1 VW Date  of Exam: 3/1/2025 8:00 PM EST Indication: Weak/Dizzy/AMS triage protocol Comparison: 1/30/2020 Findings: Heart is enlarged. Pulmonary vascularity is normal. The lungs are clear. No pneumothorax. There is mild thoracic levoscoliosis.     No active disease. Electronically Signed: Barak Valladares MD  3/1/2025 8:06 PM EST  Workstation ID: [id number]       Differential Diagnosis and Discussion:    Palpitations: Differential diagnosis includes but is not limited to anxiety, atrioventricular blocks, mitral valve disease, hypoxia, coronary artery disease, hypokalemia, anemia, fever, COPD, congestive heart failure, pericarditis, Molina-Parkinson-White syndrome, pulmonary embolism, SVT, atrial fibrillation, atrial flutter, sinus tachycardia, thyrotoxicosis, and pheochromocytoma.    PROCEDURES:    Labs were collected in the emergency department and all labs were reviewed and interpreted by me.  X-ray were performed in the emergency department and all X-ray impressions were independently interpreted by me.  An EKG was performed and the EKG was interpreted by me.    ECG 12 Lead ED Triage Standing Order; Weak / Dizzy / AMS   Preliminary Result   HEART RATE=90  bpm   RR Eiudguoe=494  ms   LA Bpfkxgmu=667  ms   P Horizontal Axis=-18  deg   P Front Axis=76  deg   QRSD Ymviixkw=455  ms   QT Abgbqmiv=600  ms   DOaA=677  ms   QRS Axis=-41  deg   T Wave Axis=9  deg   - ABNORMAL ECG -   Sinus rhythm   Atrial premature complex   Incomplete RBBB and LAFB   Low voltage, precordial leads   Date and Time of Study:2025-03-01 19:42:06          Procedures    MDM                     Patient Care Considerations:    CONSULT: I considered consulting cardiology, however we have ruled out myocardial infarction and the patient has no evidence of heart arrhythmia.  In addition, she is entirely asymptomatic here in the emergency department.      Consultants/Shared Management Plan:    None    Social Determinants of Health:    Patient is independent,  reliable, and has access to care.       Disposition and Care Coordination:    Discharged: I considered escalation of care by admitting this patient to the hospital, however patient is asymptomatic with normal ED workup.  She states without my prompting this could likely be anxiety related.    I have explained the patient´s condition, diagnoses and treatment plan based on the information available to me at this time. I have answered questions and addressed any concerns. The patient has a good  understanding of the patient´s diagnosis, condition, and treatment plan as can be expected at this point. The vital signs have been stable. The patient´s condition is stable and appropriate for discharge from the emergency department.      The patient will pursue further outpatient evaluation with the primary care physician or other designated or consulting physician as outlined in the discharge instructions. They are agreeable to this plan of care and follow-up instructions have been explained in detail. The patient has received these instructions in written format and has expressed an understanding of the discharge instructions. The patient is aware that any significant change in condition or worsening of symptoms should prompt an immediate return to this or the closest emergency department or call to 911.    Final diagnoses:   Palpitations   Grief reaction        ED Disposition       ED Disposition   Discharge    Condition   Stable    Comment   --               This medical record created using voice recognition software.             Sreekanth Gomez MD  03/01/25 [id number]

## 2025-03-13 LAB
QT INTERVAL: 389 MS
QTC INTERVAL: 478 MS

## 2025-03-17 ENCOUNTER — TELEPHONE (OUTPATIENT)
Dept: FAMILY MEDICINE CLINIC | Facility: CLINIC | Age: 80
End: 2025-03-17

## 2025-03-17 NOTE — TELEPHONE ENCOUNTER
Caller: Gaby Reyes    Relationship: Self    Best call back number: [phone]    What is the best time to reach you: ANYTIME    Who are you requesting to speak with (clinical staff, provider,  specific staff member): DRE OR HER NURSE    Do you know the name of the person who called: PATIENT    What was the call regarding: PATIENT HAD AN APPOINTMENT WITH DRE AND DISCUSSED EKG RESULTS. SHE WAS TOLD THAT IF SHE HAD ANY MORE PALPITATIONS THAT LASTED FOR A WHILE TO GO TO THE ER. PATIENT DID GO TO ER. THERE WERE SEVERAL TESTS RUN AND THEY SENT HER HOME.     SHE WANTED TO UPDATE THIS INFORMATION WITH DRE THAT SHE MAY WANT TO REVIEW ALL OF THIS INFORMATION. PATIENT WOULD LIKE TO KNOW IF THE ECHO AND HOLTER MONITOR SHOULD BE SCHEDULED SOONER. PLEASE CALL PATIENT BACK AND ADVISE. PATIENT DOES NOT KNOW IF SHE NEEDS A FOLLOW UP VISIT FOR THE ER VISIT. IT HAS BEEN TWO WEEKS SINCE SHE WAS AT THE ER.    Is it okay if the provider responds through MyChart: YES

## 2025-03-25 ENCOUNTER — HOSPITAL ENCOUNTER (OUTPATIENT)
Facility: HOSPITAL | Age: 80
Discharge: HOME | End: 2025-03-25
Admitting: NURSE PRACTITIONER
Payer: MEDICARE

## 2025-03-25 DIAGNOSIS — I45.10 RBBB: ICD-10-CM

## 2025-03-25 DIAGNOSIS — I49.9 IRREGULAR HEART RATE: ICD-10-CM

## 2025-03-25 DIAGNOSIS — R53.83 FATIGUE, UNSPECIFIED TYPE: ICD-10-CM

## 2025-03-25 DIAGNOSIS — R94.31 ABNORMAL EKG: ICD-10-CM

## 2025-03-25 LAB
AORTIC DIMENSIONLESS INDEX: 0.86 (DI)
ASCENDING AORTA: 3.4 CM
AV MEAN PRESS GRAD SYS DOP V1V2: 5.3 MMHG
AV VMAX SYS DOP: 147.9 CM/SEC
BH CV ECHO MEAS - 2D AUTO EF SIEMENS: 57.1 %
BH CV ECHO MEAS - AI P1/2T: 879 MSEC
BH CV ECHO MEAS - AO MAX PG: 8.7 MMHG
BH CV ECHO MEAS - AO ROOT DIAM: 3.5 CM
BH CV ECHO MEAS - AO V2 VTI: 30.9 CM
BH CV ECHO MEAS - AVA(I,D): 2.7 CM2
BH CV ECHO MEAS - FS: 30 %
BH CV ECHO MEAS - IVS/LVPW: 1 CM
BH CV ECHO MEAS - IVSD: 1.2 CM
BH CV ECHO MEAS - LA DIMENSION: 4.1 CM
BH CV ECHO MEAS - LAT PEAK E' VEL: 7 CM/SEC
BH CV ECHO MEAS - LV MAX PG: 6.8 MMHG
BH CV ECHO MEAS - LV MEAN PG: 3.6 MMHG
BH CV ECHO MEAS - LV V1 MAX: 130 CM/SEC
BH CV ECHO MEAS - LV V1 VTI: 26.6 CM
BH CV ECHO MEAS - LVIDD: 4.6 CM
BH CV ECHO MEAS - LVIDS: 3.2 CM
BH CV ECHO MEAS - LVOT AREA: 3.1 CM2
BH CV ECHO MEAS - LVOT DIAM: 2 CM
BH CV ECHO MEAS - LVPWD: 1.2 CM
BH CV ECHO MEAS - MED PEAK E' VEL: 9.9 CM/SEC
BH CV ECHO MEAS - MV A MAX VEL: 137.9 CM/SEC
BH CV ECHO MEAS - MV E MAX VEL: 93.1 CM/SEC
BH CV ECHO MEAS - MV E/A: 0.68
BH CV ECHO MEAS - RVDD: 2.8 CM
BH CV ECHO MEAS - SV(LVOT): 83.6 ML
BH CV ECHO MEAS - SVI(LVOT): 39.8 ML/M2
BH CV ECHO MEAS - TAPSE (>1.6): 2.41 CM
BH CV ECHO MEAS - TR MAX PG: 26 MMHG
BH CV ECHO MEAS - TR MAX VEL: 255.1 CM/SEC
BH CV ECHO MEASUREMENTS AVERAGE E/E' RATIO: 11.02
IVRT: 58 MS

## 2025-03-25 PROCEDURE — 93306 TTE W/DOPPLER COMPLETE: CPT

## 2025-03-25 PROCEDURE — 93306 TTE W/DOPPLER COMPLETE: CPT | Performed by: INTERNAL MEDICINE

## 2025-03-26 ENCOUNTER — OFFICE VISIT (OUTPATIENT)
Dept: FAMILY MEDICINE CLINIC | Facility: CLINIC | Age: 80
End: 2025-03-26
Payer: MEDICARE

## 2025-03-26 VITALS
TEMPERATURE: 98.8 F | OXYGEN SATURATION: 98 % | RESPIRATION RATE: 18 BRPM | HEIGHT: 67 IN | BODY MASS INDEX: 34.15 KG/M2 | WEIGHT: 217.6 LBS | SYSTOLIC BLOOD PRESSURE: 128 MMHG | HEART RATE: 87 BPM | DIASTOLIC BLOOD PRESSURE: 82 MMHG

## 2025-03-26 DIAGNOSIS — G47.33 OSA (OBSTRUCTIVE SLEEP APNEA): ICD-10-CM

## 2025-03-26 DIAGNOSIS — Z09 FOLLOW-UP EXAM: Primary | ICD-10-CM

## 2025-03-26 DIAGNOSIS — R00.2 PALPITATIONS: ICD-10-CM

## 2025-03-26 NOTE — PROGRESS NOTES
Chief Complaint  Hospital Follow Up Visit    Subjective        Medical History: has a past medical history of Abnormal ECG (6/26/2023), Allergic rhinitis (10/30/2014), Anxiety, Arthritis, Asthma, Bronchitis, Cataract (July 2021), Chronic pain of right knee (01/29/2015), Deafness, Depression, Gall stones (1997), High blood pressure, High cholesterol, Hyperlipemia, Hyperlipidemia (02/08/2018), Hypertension, Impaired fasting glucose (04/28/2014), Osteopenia (11/2015), Pneumonia, Pressure ulcer, stage 1, Reflux esophagitis, Seasonal allergies, Sinus trouble, Sleep apnea (2006), Type 2 diabetes mellitus without complication, and Vertigo.     Surgical History: has a past surgical history that includes Colonoscopy; Gallbladder surgery (1997); Hysterectomy (1983); Tonsillectomy; and Cholecystectomy (1997).     Family History: family history includes Arthritis in her father; Asthma in her sister; Cancer in her maternal aunt, maternal uncle, and mother; Diabetes in her mother; Diabetes type II in her brother and mother; Heart attack in her father and paternal grandfather; Heart disease in her paternal grandfather and paternal grandmother; Heart disease (age of onset: 50) in her brother; Heart disease (age of onset: 59) in her father; Heart failure in her paternal grandmother; Hypertension in her brother, father, mother, and sister; Kidney cancer in her mother.     Social History: reports that she has never smoked. She has never been exposed to tobacco smoke. She has never used smokeless tobacco. She reports that she does not currently use alcohol. She reports that she does not use drugs.    Gaby Reyes presents to [facility]    History of Present Illness    History of Present Illness  The patient presents for evaluation of heart palpitations and sleep apnea.    She reports no prolonged episodes of heart palpitations since her last emergency room visit. However, she experiences palpitations  during physical exertion, such as carrying groceries upstairs. She is scheduled to see Dr. Brambila in 08/2025. She has been advised to avoid strenuous activities until her condition is fully understood. She has discontinued coffee consumption but continues to consume small amount of Diet Coke in the afternoon. She is not currently on any beta-blocker medication. She has a family history of cardiac issues, with her sister having a murmur and her older brother requiring a defibrillator due to sudden cardiac arrest. Approximately 15 to 20 years ago, she was evaluated by an electrophysiologist who found no signs of cardiac issues.    Her blood pressure spiked to 176, prompting her to seek medical attention. She has been managing her blood pressure with half a tablet of medication, as prescribed by Dr. Vaca, but had to revert to a full tablet due to fluid accumulation in her legs. Her blood pressure typically ranges in the 110s. She maintains a healthy lifestyle, including regular morning exercise, but an injury has temporarily halted her exercise routine.    She reports nocturia, necessitating at least one bathroom visit per night, and occasional difficulty returning to sleep. Her sleep quality varies, with some nights being better than others. She has not been evaluated for sleep apnea since 2015 or 2016. She previously used a CPAP machine but discontinued its use due to discomfort and travel commitments. She is open to undergoing a home sleep study.    Her morning blood sugar levels have been slightly elevated, ranging from 106 to 107, despite improved sleep quality. She reports no recent episodes of hypoglycemia.    Supplemental Information  She is awaiting cataract surgery and lens replacement.    SOCIAL HISTORY  She has given up coffee and only consumes a small diet drink with caffeine in the afternoon.    FAMILY HISTORY  Her sister has a heart murmur. Her older brother had a defibrillator implanted due to his  "heart stopping and passed away in 2021.      Objective   Vital Signs:   /82 (BP Location: Right arm, Patient Position: Sitting, Cuff Size: Adult)   Pulse 87   Temp 98.8 °F (37.1 °C) (Temporal)   Resp 18   Ht 170.2 cm (67\")   Wt 98.7 kg (217 lb 9.6 oz)   SpO2 98%   BMI 34.08 kg/m²       Wt Readings from Last 3 Encounters:   03/26/25 98.7 kg (217 lb 9.6 oz)   03/01/25 99.6 kg (219 lb 9.3 oz)   02/26/25 98.9 kg (218 lb)        BP Readings from Last 3 Encounters:   03/26/25 128/82   03/01/25 128/51   02/26/25 132/82                Physical Exam  Vitals reviewed.   Constitutional:       General: She is not in acute distress.     Appearance: Normal appearance. She is well-developed. She is obese. She is not ill-appearing.   HENT:      Head: Normocephalic and atraumatic.   Eyes:      Conjunctiva/sclera: Conjunctivae normal.      Pupils: Pupils are equal, round, and reactive to light.   Cardiovascular:      Rate and Rhythm: Normal rate and regular rhythm. Rhythm irregular. Occasional Extrasystoles are present.     Heart sounds: No murmur heard.  Pulmonary:      Effort: Pulmonary effort is normal.      Breath sounds: Normal breath sounds. No wheezing.   Musculoskeletal:      Right lower leg: No edema.      Left lower leg: No edema.   Skin:     General: Skin is warm and dry.   Neurological:      Mental Status: She is alert and oriented to person, place, and time.   Psychiatric:         Mood and Affect: Mood and affect normal.         Behavior: Behavior normal.         Thought Content: Thought content normal.         Judgment: Judgment normal.        Result Review :  The following data was reviewed by CALISTA Mejia on 03/26/2025.  Common labs          2/24/2025    08:50 2/26/2025    14:14 2/26/2025    14:16 3/1/2025    19:41   Common Labs   Glucose   112  126    BUN   15  18    Creatinine   0.66  0.63    Sodium   137  138    Potassium   3.6  3.5    Chloride   100  99    Calcium   9.6  10.1    Albumin  "   4.1    Total Bilirubin    0.3    Alkaline Phosphatase    71    AST (SGOT)    14    ALT (SGPT)    13    WBC 10.42    10.24    Hemoglobin 15.1    14.8    Hematocrit 47.0    43.5    Platelets 364    313    Microalbumin, Urine  <1.2        Data reviewed : previous office note             Current Outpatient Medications on File Prior to Visit   Medication Sig Dispense Refill    albuterol sulfate  (90 Base) MCG/ACT inhaler Inhale 2 puffs Every 6 (Six) Hours. 8.6 g 1    aspirin 81 MG chewable tablet Chew 1 tablet Daily. 90 tablet 1    azelastine (ASTELIN) 0.1 % nasal spray Administer 2 sprays into the nostril(s) as directed by provider 2 (Two) Times a Day. Use in each nostril as directed 30 mL 5    Calcium Carbonate-Vitamin D 600-10 MG-MCG per tablet Take 1 tablet by mouth 2 (Two) Times a Day. 180 tablet 1    EPINEPHrine (EPIPEN) 0.3 MG/0.3ML solution auto-injector injection       fexofenadine (ALLEGRA) 180 MG tablet Take 1 tablet by mouth Daily. 90 tablet 2    fluticasone (FLONASE) 50 MCG/ACT nasal spray Administer 1 spray into the nostril(s) as directed by provider Daily. 16 g 3    fluticasone-salmeterol (Advair HFA) 230-21 MCG/ACT inhaler Inhale 2 puffs 2 (Two) Times a Day. 8 g 1    glucose (DEX4) 4 GM chewable tablet Chew 4 tablets As Needed for Low Blood Sugar. 120 tablet 3    glucose blood test strip BID fasting and 2hr after largest meal E11.9 180 each 2    glucose monitor monitoring kit Use 1 each As Needed (BID). E11.9 BID fasting and 2hr after largest meal 1 each 0    hydroCHLOROthiazide 25 MG tablet Take 1 tablet by mouth Daily for 180 days. 90 tablet 1    Lancets 28G misc Use 1 Device 2 (Two) Times a Day. 180 each 1    metFORMIN (GLUCOPHAGE) 500 MG tablet Take 1 tablet by mouth Daily. 90 tablet 1    montelukast (SINGULAIR) 10 MG tablet Take 1 tablet by mouth Every Night. 90 tablet 1    potassium chloride (KLOR-CON M20) 20 MEQ CR tablet Take 1 tablet by mouth 2 (Two) Times a Day. 180 tablet 1     pravastatin (PRAVACHOL) 10 MG tablet Take 1 tablet by mouth Daily. 90 tablet 1    vitamin B-12 (CYANOCOBALAMIN) 1000 MCG tablet Take 1 tablet by mouth Daily. 90 tablet 1    vitamin D (ERGOCALCIFEROL) 1.25 MG (05085 UT) capsule capsule Take 1 capsule by mouth Every 7 (Seven) Days. 13 capsule 1    levalbuterol (XOPENEX HFA) 45 MCG/ACT inhaler Inhale 1-2 puffs Every 4 (Four) Hours As Needed for Wheezing. 15 g 11     No current facility-administered medications on file prior to visit.        Assessment and Plan  Diagnoses and all orders for this visit:    1. ER Follow-up exam (Primary)    2. Palpitations  -     Ambulatory Referral to Sleep Medicine    3. CHUCK (obstructive sleep apnea)  -     Ambulatory Referral to Sleep Medicine        Assessment & Plan  1. Heart palpitations.  The echocardiogram results were normal, showing no signs of valvular dysfunction or congestive heart failure. The ventricular systolic and diastolic functions are normal, with an estimated ejection fraction of 57%. Despite these findings, she continues to experience PVCs and extra heartbeats. The etiology of these symptoms remains uncertain, but they could be attributed to stress or anxiety. No arrhythmia has been detected. She is advised to monitor her caffeine intake and avoid excessive exertion until further evaluation. A Holter monitor will be arranged for her. If her symptoms become more bothersome before the Holter monitor is obtained, a beta blocker may be considered to help regulate her heart rate.    2. Sleep apnea.  She has not been evaluated for sleep apnea since 2015 or 2016. A referral to sleep medicine has been made for further evaluation. She is open to trying a home sleep study, which will involve using an oxygen cannula, a strap to detect breathing, and a pulse oximeter taped to her finger.    3. Hypertension.  Her blood pressure was reported to be 176 mmHg when she called. She has been advised to continue monitoring her blood  pressure and avoid excessive caffeine intake. If her blood pressure remains high or becomes more symptomatic, adjustments to her current medication regimen may be necessary.    4. Elevated blood sugar.  Her blood sugar levels have been slightly elevated in the morning, around 106-107 mg/dL, instead of in the 90s. She reports eating well and sleeping better at night. Continued monitoring of her blood sugar levels is recommended.      Follow Up   Return in about 5 months (around 8/11/2025) for Next scheduled follow up.  Patient was given instructions and counseling regarding her condition or for health maintenance advice. Please see specific information pulled into the AVS if appropriate.       Part of this note may be electronic transcription/translation of spoken language to printed text using the Dragon dictation system    Patient or patient representative verbalized consent for the use of Ambient Listening during the visit with  CALISTA Mejia for chart documentation. 3/26/2025  09:17 EDT

## 2025-04-23 ENCOUNTER — OFFICE VISIT (OUTPATIENT)
Dept: SLEEP MEDICINE | Facility: HOSPITAL | Age: 80
End: 2025-04-23
Payer: MEDICARE

## 2025-04-23 VITALS
SYSTOLIC BLOOD PRESSURE: 123 MMHG | HEIGHT: 67 IN | DIASTOLIC BLOOD PRESSURE: 62 MMHG | WEIGHT: 216 LBS | HEART RATE: 67 BPM | OXYGEN SATURATION: 97 % | BODY MASS INDEX: 33.9 KG/M2

## 2025-04-23 DIAGNOSIS — Z72.821 INADEQUATE SLEEP HYGIENE: ICD-10-CM

## 2025-04-23 DIAGNOSIS — E66.9 OBESITY (BMI 30-39.9): ICD-10-CM

## 2025-04-23 DIAGNOSIS — E11.9 TYPE 2 DIABETES MELLITUS WITHOUT COMPLICATION, UNSPECIFIED WHETHER LONG TERM INSULIN USE: ICD-10-CM

## 2025-04-23 DIAGNOSIS — I10 PRIMARY HYPERTENSION: ICD-10-CM

## 2025-04-23 DIAGNOSIS — G47.63 SLEEP-RELATED BRUXISM: ICD-10-CM

## 2025-04-23 DIAGNOSIS — G47.33 OSA (OBSTRUCTIVE SLEEP APNEA): Primary | ICD-10-CM

## 2025-04-23 DIAGNOSIS — G47.10 HYPERSOMNIA: ICD-10-CM

## 2025-04-23 PROCEDURE — G0463 HOSPITAL OUTPT CLINIC VISIT: HCPCS

## 2025-04-23 NOTE — PROGRESS NOTES
Sleep Consultation    Patient Name: Gaby Reyes  Age/Sex: 79 y.o. female  : 1945  MRN: [id number]    Date of Encounter Visit: 2025  Encounter Provider: Joanie Lawrence MD  Referring Provider: AGA Mejia  Place of Service: [facility]  Patient Care Team:  Ramiro Mccloud APRN as PCP - General (Nurse Practitioner)    Subjective:     Reason for Consult: Obstructive sleep apnea    History of Present Illness:  Gaby Reyes is a 79 y.o. female is here for evaluation of CHUCK due to prior diagnosis.  Patient had a sleep study back in  at [place] and she was started on the CPAP and she loves it.  She felt great on it and was doing it regularly until it stopped working and she was reevaluated back in   in  and back when she weighed 244 pounds and had a sleep study that was abnormal by the RDI criteria at 8.2 .with a REM component with REM AHI of 17.9.  Patient could not tolerate the high pressure and she was not happy with the results and she did not get that CPAP set up back then at the time she was also taking care of her sick  so she was overwhelmed and did not pursue further evaluation.  She has been on no treatment since.  The patient would like to go back on the CPAP if that is possible  Patient reported that she had problem with sleep fragmentation that she attributed to the need to take care of her ill  but even after he passed away she is still having problem waking up several times at night  Patient does have witnessed apnea, she has history of snoring and morning headache  She does have excessive night sweats and nocturnal bruxism  Patient complains of daytime fatigue and sleepiness with an [place] Sleepiness Scale (ESS) of 8.  Patient had restless leg syndrome symptoms, resolved after she adjusted her Magnesium and Potassium   Patient denies any cataplexy, sleep paralysis or other symptoms to suggest narcolepsy.  Patient denies  any parasomnias.  Denies any history of seizure disorder or recent head trauma.  Patient spends adequate amount of time in bed with no evidence of sleep restriction or improper sleep hygiene.  Typical bedtime is around 10 PM wake up time is 7 AM and she spent 9 hours in bed and she wakes up feeling tired  Caffeine intake is usually 2-3 coffee per week which is minimal, no smoking alcohol or substance abuse  Patient is retired  Comorbidities include: History of sleep apnea, hypertension acid reflux, asthma, diabetes and arthritis    Review of Systems:   A twelve-system review was conducted and was negative except for the following: Nasal congestion with postnasal drainage, frequent urination, fatigue, irregular heartbeat, and heartburn.        Past Medical History:  Past Medical History:   Diagnosis Date    Abnormal ECG 6/26/2023    Allergic rhinitis 10/30/2014    Anxiety     Arthritis     Asthma     STOPPED IN 2003    Bronchitis     Cataract July 2021    Chronic pain of right knee 01/29/2015    Deafness     Depression     Gall stones 1997    High blood pressure     High cholesterol     Hyperlipemia     Hyperlipidemia 02/08/2018    Hypertension     Impaired fasting glucose 04/28/2014    Osteopenia 11/2015    L1 LUMBAR SPINE    Pneumonia     Pressure ulcer, stage 1     Reflux esophagitis     Seasonal allergies     Sinus trouble     Sleep apnea 2006    When retested 2016 results were indeterminate    Type 2 diabetes mellitus without complication     Vertigo        Past Surgical History:   Procedure Laterality Date    CHOLECYSTECTOMY  1997    COLONOSCOPY      GALLBLADDER SURGERY  1997    HYSTERECTOMY  1983    TONSILLECTOMY         Home Medications:     Current Outpatient Medications:     albuterol sulfate  (90 Base) MCG/ACT inhaler, Inhale 2 puffs Every 6 (Six) Hours., Disp: 8.6 g, Rfl: 1    aspirin 81 MG chewable tablet, Chew 1 tablet Daily., Disp: 90 tablet, Rfl: 1    azelastine (ASTELIN) 0.1 % nasal spray,  Administer 2 sprays into the nostril(s) as directed by provider 2 (Two) Times a Day. Use in each nostril as directed, Disp: 30 mL, Rfl: 5    Calcium Carbonate-Vitamin D 600-10 MG-MCG per tablet, Take 1 tablet by mouth 2 (Two) Times a Day., Disp: 180 tablet, Rfl: 1    EPINEPHrine (EPIPEN) 0.3 MG/0.3ML solution auto-injector injection, , Disp: , Rfl:     fexofenadine (ALLEGRA) 180 MG tablet, Take 1 tablet by mouth Daily., Disp: 90 tablet, Rfl: 2    fluticasone (FLONASE) 50 MCG/ACT nasal spray, Administer 1 spray into the nostril(s) as directed by provider Daily., Disp: 16 g, Rfl: 3    fluticasone-salmeterol (Advair HFA) 230-21 MCG/ACT inhaler, Inhale 2 puffs 2 (Two) Times a Day., Disp: 8 g, Rfl: 1    glucose (DEX4) 4 GM chewable tablet, Chew 4 tablets As Needed for Low Blood Sugar., Disp: 120 tablet, Rfl: 3    glucose blood test strip, BID fasting and 2hr after largest meal E11.9, Disp: 180 each, Rfl: 2    glucose monitor monitoring kit, Use 1 each As Needed (BID). E11.9 BID fasting and 2hr after largest meal, Disp: 1 each, Rfl: 0    hydroCHLOROthiazide 25 MG tablet, Take 1 tablet by mouth Daily for 180 days., Disp: 90 tablet, Rfl: 1    Lancets 28G misc, Use 1 Device 2 (Two) Times a Day., Disp: 180 each, Rfl: 1    levalbuterol (XOPENEX HFA) 45 MCG/ACT inhaler, Inhale 1-2 puffs Every 4 (Four) Hours As Needed for Wheezing., Disp: 15 g, Rfl: 11    metFORMIN (GLUCOPHAGE) 500 MG tablet, Take 1 tablet by mouth Daily., Disp: 90 tablet, Rfl: 1    montelukast (SINGULAIR) 10 MG tablet, Take 1 tablet by mouth Every Night., Disp: 90 tablet, Rfl: 1    potassium chloride (KLOR-CON M20) 20 MEQ CR tablet, Take 1 tablet by mouth 2 (Two) Times a Day., Disp: 180 tablet, Rfl: 1    pravastatin (PRAVACHOL) 10 MG tablet, Take 1 tablet by mouth Daily., Disp: 90 tablet, Rfl: 1    vitamin B-12 (CYANOCOBALAMIN) 1000 MCG tablet, Take 1 tablet by mouth Daily. (Patient taking differently: Take 1 tablet by mouth As Needed.), Disp: 90 tablet, Rfl:  "1    vitamin D (ERGOCALCIFEROL) 1.25 MG (00392 UT) capsule capsule, Take 1 capsule by mouth Every 7 (Seven) Days., Disp: 13 capsule, Rfl: 1    Allergies:  No Known Allergies    Past Social History:  Social History     Socioeconomic History    Marital status:    Tobacco Use    Smoking status: Never     Passive exposure: Never    Smokeless tobacco: Never   Vaping Use    Vaping status: Never Used   Substance and Sexual Activity    Alcohol use: Not Currently    Drug use: Never    Sexual activity: Not Currently     Partners: Male     Birth control/protection: Post-menopausal, Hysterectomy       Past Family History:  Family History   Problem Relation Age of Onset    Cancer Mother         Kidney    Diabetes type II Mother     Kidney cancer Mother     Diabetes Mother         dev after kidney removed    Hypertension Mother              Obesity Mother     Heart disease Father 59             Arthritis Father     Hypertension Father     Heart attack Father     Asthma Sister     Hypertension Sister     Obesity Sister     Heart disease Brother 50             Diabetes type II Brother     Hypertension Brother     Sleep apnea Brother     Obesity Brother     Cancer Maternal Aunt         Lung    Cancer Maternal Uncle         Skin    Heart disease Paternal Grandmother              Heart failure Paternal Grandmother              Heart disease Paternal Grandfather              Heart attack Paternal Grandfather      Positive family hx of sleep apnea in her brother  Objective:        Vital Signs:   Visit Vitals  /62 (BP Location: Left arm, Patient Position: Sitting)   Pulse 67   Ht 170.2 cm (67.01\")   Wt 98 kg (216 lb)   SpO2 97%   BMI 33.82 kg/m²     Wt Readings from Last 3 Encounters:   25 98 kg (216 lb)   25 98.7 kg (217 lb 9.6 oz)   25 99.6 kg (219 lb 9.3 oz)     Neck Circumference: 13 inches    Physical Exam:   GEN:  No acute " distress, alert, cooperative, well developed, obese   EYES:   Sclerae clear. No icterus. PERRL. Normal EOM  ENT:   External ears/nose normal, no oral lesions, no thrush, mucous membranes moist, Septum midline. Mallampati IV airway.    NECK:  Supple, midline trachea, no JVD  LUNGS: Normal chest on inspection, no wheezes. No rhonchi. No crackles. Respirations regular, even and unlabored. Mild Kyphosis  CV:  Regular rhythm and rate. Normal S1/S2. No murmurs, gallops, or rubs noted.  ABD:  Soft, nontender and nondistended. Normal bowel sounds. No guarding  EXT:  Moves all extremities well. No cyanosis. No redness. No edema.   Skin: Dry, intact, no bleeding      Diagnostic Data:  Polysomnography 1/4/2015:  Body weight 244 pounds   RDI of 8.2 , supine AHI was 0, REM AHI was 17.9  No periodic leg movement  Arousal index was 15.0 and most of these were spontaneous  No hypoxemia    CPAP titration 3/3/2015  Body weight was 244 pounds.  During the titration portion the AHI was still abnormal at average of 5.6  Patient was started on CPAP with initial pressure of 7 that was started although up to a pressure of 17  AHI seems to increase in rem sleep and it took a pressure of 15 and above to normalize the AHI and REM sleep    ECHO 3/25/2025:    Left ventricular systolic function is normal. Automated 2D EF = 57.1%    Left ventricular diastolic function was normal.    Assessment and Plan:       ICD-10-CM ICD-9-CM   1. CHUCK (obstructive sleep apnea)  G47.33 327.23   2. Hypersomnia  G47.10 780.54   3. Inadequate sleep hygiene  Z72.821 307.49   4. Primary hypertension  I10 401.9   5. Type 2 diabetes mellitus without complication, unspecified whether long term insulin use  E11.9 250.00   6. Obesity (BMI 30-39.9)  E66.9 278.00   7. Sleep-related bruxism  G47.63 327.53       Recommendations:     Patient has obstructive sleep apnea with history of significant subjective improvement on the CPAP however due to the challenges she had back in  2015 and the overwhelming situation at home, she did not continue with the CPAP.  When she was on the CPAP she felt better and she would like to go back on it especially with the worsening symptoms.  Since she had established diagnosis of sleep apnea, we will go ahead and order an auto CPAP and we will use a low pressure range per her preference, we will go with 5-10 cm H2O  Patient was made aware that there is a chance that she might need to be reevaluated in that case we will reorder the sleep study.  The titration study resulted in a very high pressure and that was mainly to control her sleep apnea and REM sleep but the pressure was too high that it had the opposite effect.  The sleep study was reviewed, no access to the raw data to see if these were central sleep apnea that were mis diagnosed.  She did a lot better on the lower pressure even before the weight loss the suggest that she was over titrated and she might do better on the lower pressure.    Patient was educated in depth about CHUCK and cardiovascular consequences if left untreated, including but not limited to CHF, CAD, arrhythmias, CVA, and/or HTN. Education also provided about the diagnostic tools for CHUCK, including the polysomnography and the treatment modalities, including the CPAP.     Adherence to the CPAP is a key factor in successful treatment of CHUCK and the patient was encouraged to contact us in case of problem with the CPAP or the mask that can limit the tolerance of the compliance with the therapy.    Patient was educated about the impact of obesity on sleep apnea and the benefit of weight loss and weight loss was recommended    Orders Placed This Encounter   Procedures    PAP Therapy     No orders of the defined types were placed in this encounter.     Return in about 3 months (around 7/23/2025).    Joanie Lawrence MD   [facility]   04/23/25  10:34 EDT    Dictated utilizing Dragon dictation

## 2025-04-24 ENCOUNTER — APPOINTMENT (OUTPATIENT)
Facility: HOSPITAL | Age: 80
End: 2025-04-24
Payer: MEDICARE

## 2025-04-24 ENCOUNTER — HOSPITAL ENCOUNTER (OUTPATIENT)
Facility: HOSPITAL | Age: 80
Discharge: HOME | End: 2025-04-24
Admitting: NURSE PRACTITIONER
Payer: MEDICARE

## 2025-04-24 DIAGNOSIS — I49.9 IRREGULAR HEART RATE: ICD-10-CM

## 2025-04-24 PROCEDURE — 93225 XTRNL ECG REC<48 HRS REC: CPT

## 2025-05-09 LAB
CV ZIO BASELINE AVG BPM: 69
CV ZIO BASELINE BPM HIGH: 169
CV ZIO BASELINE BPM LOW: 43

## 2025-05-14 ENCOUNTER — TELEPHONE (OUTPATIENT)
Dept: CARDIOLOGY | Facility: CLINIC | Age: 80
End: 2025-05-14
Payer: MEDICARE

## 2025-05-14 NOTE — TELEPHONE ENCOUNTER
Procedure:Cataract Surgery    Med Directive:No need to discontinue aspirin per clearance request    PMH:Aortic stenosis, HTN, HLD    Last Seen:08/19/2024

## 2025-05-15 NOTE — TELEPHONE ENCOUNTER
Low risk   Quality 138: Melanoma: Coordination Of Care: A treatment plan was communicated to the physicians providing continuing care within one month of diagnosis outlining: diagnosis, tumor thickness and a plan for surgery or alternate care. Detail Level: Detailed Quality 137: Melanoma: Continuity Of Care - Recall System: Patient information entered into a recall system that includes: target date for the next exam specified AND a process to follow up with patients regarding missed or unscheduled appointments

## 2025-08-05 ENCOUNTER — OFFICE VISIT (OUTPATIENT)
Dept: SLEEP MEDICINE | Facility: HOSPITAL | Age: 80
End: 2025-08-05
Payer: MEDICARE

## 2025-08-05 ENCOUNTER — HOSPITAL ENCOUNTER (OUTPATIENT)
Dept: SLEEP MEDICINE | Facility: HOSPITAL | Age: 80
Discharge: HOME | End: 2025-08-05
Admitting: INTERNAL MEDICINE
Payer: MEDICARE

## 2025-08-05 VITALS
DIASTOLIC BLOOD PRESSURE: 53 MMHG | BODY MASS INDEX: 33.57 KG/M2 | HEART RATE: 65 BPM | OXYGEN SATURATION: 96 % | HEIGHT: 67 IN | WEIGHT: 213.9 LBS | SYSTOLIC BLOOD PRESSURE: 131 MMHG

## 2025-08-05 DIAGNOSIS — G47.33 OSA (OBSTRUCTIVE SLEEP APNEA): Primary | ICD-10-CM

## 2025-08-05 DIAGNOSIS — E11.9 TYPE 2 DIABETES MELLITUS WITHOUT COMPLICATION, UNSPECIFIED WHETHER LONG TERM INSULIN USE: ICD-10-CM

## 2025-08-05 DIAGNOSIS — G47.33 OSA (OBSTRUCTIVE SLEEP APNEA): ICD-10-CM

## 2025-08-05 DIAGNOSIS — G47.10 HYPERSOMNIA: ICD-10-CM

## 2025-08-05 DIAGNOSIS — E78.5 HYPERLIPIDEMIA, UNSPECIFIED HYPERLIPIDEMIA TYPE: ICD-10-CM

## 2025-08-05 DIAGNOSIS — E66.9 OBESITY (BMI 30-39.9): ICD-10-CM

## 2025-08-05 PROCEDURE — G0399 HOME SLEEP TEST/TYPE 3 PORTA: HCPCS

## 2025-08-20 ENCOUNTER — OFFICE VISIT (OUTPATIENT)
Dept: CARDIOLOGY | Facility: CLINIC | Age: 80
End: 2025-08-20
Payer: MEDICARE

## 2025-08-20 VITALS
HEIGHT: 67 IN | WEIGHT: 214 LBS | HEART RATE: 64 BPM | SYSTOLIC BLOOD PRESSURE: 134 MMHG | DIASTOLIC BLOOD PRESSURE: 56 MMHG | BODY MASS INDEX: 33.59 KG/M2

## 2025-08-20 DIAGNOSIS — I47.10 PSVT (PAROXYSMAL SUPRAVENTRICULAR TACHYCARDIA): Primary | ICD-10-CM

## 2025-08-21 ENCOUNTER — OFFICE VISIT (OUTPATIENT)
Dept: FAMILY MEDICINE CLINIC | Facility: CLINIC | Age: 80
End: 2025-08-21
Payer: MEDICARE

## 2025-08-21 VITALS
BODY MASS INDEX: 33.68 KG/M2 | DIASTOLIC BLOOD PRESSURE: 76 MMHG | OXYGEN SATURATION: 96 % | TEMPERATURE: 98 F | WEIGHT: 214.6 LBS | SYSTOLIC BLOOD PRESSURE: 124 MMHG | HEIGHT: 67 IN | HEART RATE: 63 BPM

## 2025-08-21 DIAGNOSIS — I10 ESSENTIAL HYPERTENSION: ICD-10-CM

## 2025-08-21 DIAGNOSIS — E55.9 VITAMIN D DEFICIENCY: ICD-10-CM

## 2025-08-21 DIAGNOSIS — J45.40 MODERATE PERSISTENT ASTHMA WITHOUT COMPLICATION: ICD-10-CM

## 2025-08-21 DIAGNOSIS — E78.2 MIXED HYPERLIPIDEMIA: ICD-10-CM

## 2025-08-21 DIAGNOSIS — G47.33 OSA (OBSTRUCTIVE SLEEP APNEA): ICD-10-CM

## 2025-08-21 DIAGNOSIS — Z00.00 ENCOUNTER FOR SUBSEQUENT ANNUAL WELLNESS VISIT (AWV) IN MEDICARE PATIENT: Primary | ICD-10-CM

## 2025-08-21 DIAGNOSIS — E11.59 TYPE 2 DIABETES MELLITUS WITH OTHER CIRCULATORY COMPLICATION, WITHOUT LONG-TERM CURRENT USE OF INSULIN: ICD-10-CM

## 2025-08-21 LAB
25(OH)D3 SERPL-MCNC: 39.3 NG/ML (ref 30–100)
ALBUMIN SERPL-MCNC: 3.9 G/DL (ref 3.5–5.2)
ALBUMIN UR-MCNC: <1.2 MG/DL
ALBUMIN/GLOB SERPL: 1.1 G/DL
ALP SERPL-CCNC: 69 U/L (ref 39–117)
ALT SERPL W P-5'-P-CCNC: 17 U/L (ref 1–33)
ANION GAP SERPL CALCULATED.3IONS-SCNC: 11.8 MMOL/L (ref 5–15)
AST SERPL-CCNC: 21 U/L (ref 1–32)
BASOPHILS # BLD AUTO: 0.06 10*3/MM3 (ref 0–0.2)
BASOPHILS NFR BLD AUTO: 0.7 % (ref 0–1.5)
BILIRUB SERPL-MCNC: 0.4 MG/DL (ref 0–1.2)
BUN SERPL-MCNC: 16 MG/DL (ref 8–23)
BUN/CREAT SERPL: 28.1 (ref 7–25)
CALCIUM SPEC-SCNC: 9.6 MG/DL (ref 8.6–10.5)
CHLORIDE SERPL-SCNC: 102 MMOL/L (ref 98–107)
CHOLEST SERPL-MCNC: 152 MG/DL (ref 0–200)
CO2 SERPL-SCNC: 23.2 MMOL/L (ref 22–29)
CREAT SERPL-MCNC: 0.57 MG/DL (ref 0.57–1)
CREAT UR-MCNC: 121.7 MG/DL
DEPRECATED RDW RBC AUTO: 40.2 FL (ref 37–54)
EGFRCR SERPLBLD CKD-EPI 2021: 93.7 ML/MIN/1.73
EOSINOPHIL # BLD AUTO: 0.1 10*3/MM3 (ref 0–0.4)
EOSINOPHIL NFR BLD AUTO: 1.1 % (ref 0.3–6.2)
ERYTHROCYTE [DISTWIDTH] IN BLOOD BY AUTOMATED COUNT: 13 % (ref 12.3–15.4)
GLOBULIN UR ELPH-MCNC: 3.6 GM/DL
GLUCOSE SERPL-MCNC: 93 MG/DL (ref 65–99)
HBA1C MFR BLD: 5.5 % (ref 4.8–5.6)
HCT VFR BLD AUTO: 43.7 % (ref 34–46.6)
HDLC SERPL-MCNC: 34 MG/DL (ref 40–60)
HGB BLD-MCNC: 14.6 G/DL (ref 12–15.9)
IMM GRANULOCYTES # BLD AUTO: 0.02 10*3/MM3 (ref 0–0.05)
IMM GRANULOCYTES NFR BLD AUTO: 0.2 % (ref 0–0.5)
LDLC SERPL CALC-MCNC: 92 MG/DL (ref 0–100)
LDLC/HDLC SERPL: 2.62 {RATIO}
LYMPHOCYTES # BLD AUTO: 3.12 10*3/MM3 (ref 0.7–3.1)
LYMPHOCYTES NFR BLD AUTO: 34.3 % (ref 19.6–45.3)
MCH RBC QN AUTO: 28.7 PG (ref 26.6–33)
MCHC RBC AUTO-ENTMCNC: 33.4 G/DL (ref 31.5–35.7)
MCV RBC AUTO: 85.9 FL (ref 79–97)
MICROALBUMIN/CREAT UR: NORMAL MG/G{CREAT}
MONOCYTES # BLD AUTO: 0.5 10*3/MM3 (ref 0.1–0.9)
MONOCYTES NFR BLD AUTO: 5.5 % (ref 5–12)
NEUTROPHILS NFR BLD AUTO: 5.3 10*3/MM3 (ref 1.7–7)
NEUTROPHILS NFR BLD AUTO: 58.2 % (ref 42.7–76)
NRBC BLD AUTO-RTO: 0 /100 WBC (ref 0–0.2)
PLATELET # BLD AUTO: 336 10*3/MM3 (ref 140–450)
PMV BLD AUTO: 11.6 FL (ref 6–12)
POTASSIUM SERPL-SCNC: 3.8 MMOL/L (ref 3.5–5.2)
PROT SERPL-MCNC: 7.5 G/DL (ref 6–8.5)
RBC # BLD AUTO: 5.09 10*6/MM3 (ref 3.77–5.28)
SODIUM SERPL-SCNC: 137 MMOL/L (ref 136–145)
TRIGL SERPL-MCNC: 145 MG/DL (ref 0–150)
TSH SERPL DL<=0.05 MIU/L-ACNC: 1.35 UIU/ML (ref 0.27–4.2)
VLDLC SERPL-MCNC: 26 MG/DL (ref 5–40)
WBC NRBC COR # BLD AUTO: 9.1 10*3/MM3 (ref 3.4–10.8)

## 2025-08-21 RX ORDER — PRAVASTATIN SODIUM 10 MG
10 TABLET ORAL DAILY
Qty: 90 TABLET | Refills: 1 | Status: SHIPPED | OUTPATIENT
Start: 2025-08-21

## 2025-08-21 RX ORDER — ASPIRIN 81 MG/1
81 TABLET, CHEWABLE ORAL DAILY
Qty: 90 TABLET | Refills: 1 | Status: SHIPPED | OUTPATIENT
Start: 2025-08-21

## 2025-08-21 RX ORDER — FEXOFENADINE HCL 180 MG/1
180 TABLET ORAL DAILY
Qty: 90 TABLET | Refills: 2 | Status: SHIPPED | OUTPATIENT
Start: 2025-08-21

## 2025-08-21 RX ORDER — FLUTICASONE PROPIONATE AND SALMETEROL XINAFOATE 230; 21 UG/1; UG/1
2 AEROSOL, METERED RESPIRATORY (INHALATION) 2 TIMES DAILY
Qty: 8 G | Refills: 1 | Status: SHIPPED | OUTPATIENT
Start: 2025-08-21

## 2025-08-21 RX ORDER — MONTELUKAST SODIUM 10 MG/1
10 TABLET ORAL NIGHTLY
Qty: 90 TABLET | Refills: 1 | Status: SHIPPED | OUTPATIENT
Start: 2025-08-21

## 2025-08-21 RX ORDER — AZELASTINE 1 MG/ML
2 SPRAY, METERED NASAL 2 TIMES DAILY
Qty: 30 ML | Refills: 5 | Status: SHIPPED | OUTPATIENT
Start: 2025-08-21

## 2025-08-21 RX ORDER — LEVALBUTEROL TARTRATE 45 UG/1
1-2 AEROSOL, METERED ORAL EVERY 4 HOURS PRN
Qty: 15 G | Refills: 11 | Status: SHIPPED | OUTPATIENT
Start: 2025-08-21

## 2025-08-21 RX ORDER — ALBUTEROL SULFATE 90 UG/1
2 INHALANT RESPIRATORY (INHALATION) EVERY 6 HOURS
Qty: 8.6 G | Refills: 1 | Status: SHIPPED | OUTPATIENT
Start: 2025-08-21

## 2025-08-23 LAB — BACTERIA SPEC AEROBE CULT: NO GROWTH
